# Patient Record
Sex: MALE | Race: WHITE | HISPANIC OR LATINO
[De-identification: names, ages, dates, MRNs, and addresses within clinical notes are randomized per-mention and may not be internally consistent; named-entity substitution may affect disease eponyms.]

---

## 2017-01-17 ENCOUNTER — APPOINTMENT (OUTPATIENT)
Dept: PLASTIC SURGERY | Facility: CLINIC | Age: 47
End: 2017-01-17

## 2017-01-24 ENCOUNTER — FORM ENCOUNTER (OUTPATIENT)
Age: 47
End: 2017-01-24

## 2017-01-25 ENCOUNTER — OUTPATIENT (OUTPATIENT)
Dept: OUTPATIENT SERVICES | Facility: HOSPITAL | Age: 47
LOS: 1 days | End: 2017-01-25
Payer: COMMERCIAL

## 2017-01-25 ENCOUNTER — RESULT CHARGE (OUTPATIENT)
Age: 47
End: 2017-01-25

## 2017-01-25 VITALS
TEMPERATURE: 98 F | RESPIRATION RATE: 16 BRPM | OXYGEN SATURATION: 97 % | WEIGHT: 212.08 LBS | HEART RATE: 70 BPM | SYSTOLIC BLOOD PRESSURE: 137 MMHG | HEIGHT: 70 IN | DIASTOLIC BLOOD PRESSURE: 88 MMHG

## 2017-01-25 DIAGNOSIS — R22.42 LOCALIZED SWELLING, MASS AND LUMP, LEFT LOWER LIMB: ICD-10-CM

## 2017-01-25 DIAGNOSIS — Z01.818 ENCOUNTER FOR OTHER PREPROCEDURAL EXAMINATION: ICD-10-CM

## 2017-01-25 DIAGNOSIS — Z98.890 OTHER SPECIFIED POSTPROCEDURAL STATES: Chronic | ICD-10-CM

## 2017-01-25 LAB
ANION GAP SERPL CALC-SCNC: 16 MMOL/L — SIGNIFICANT CHANGE UP (ref 5–17)
BUN SERPL-MCNC: 12 MG/DL — SIGNIFICANT CHANGE UP (ref 7–23)
CALCIUM SERPL-MCNC: 9.8 MG/DL — SIGNIFICANT CHANGE UP (ref 8.4–10.5)
CHLORIDE SERPL-SCNC: 100 MMOL/L — SIGNIFICANT CHANGE UP (ref 96–108)
CO2 SERPL-SCNC: 25 MMOL/L — SIGNIFICANT CHANGE UP (ref 22–31)
CREAT SERPL-MCNC: 0.67 MG/DL — SIGNIFICANT CHANGE UP (ref 0.5–1.3)
GLUCOSE SERPL-MCNC: 98 MG/DL — SIGNIFICANT CHANGE UP (ref 70–99)
HCT VFR BLD CALC: 46.2 % — SIGNIFICANT CHANGE UP (ref 39–50)
HGB BLD-MCNC: 16 G/DL — SIGNIFICANT CHANGE UP (ref 13–17)
MCHC RBC-ENTMCNC: 30.4 PG — SIGNIFICANT CHANGE UP (ref 27–34)
MCHC RBC-ENTMCNC: 34.6 GM/DL — SIGNIFICANT CHANGE UP (ref 32–36)
MCV RBC AUTO: 87.8 FL — SIGNIFICANT CHANGE UP (ref 80–100)
PLATELET # BLD AUTO: 348 K/UL — SIGNIFICANT CHANGE UP (ref 150–400)
POTASSIUM SERPL-MCNC: 4.6 MMOL/L — SIGNIFICANT CHANGE UP (ref 3.5–5.3)
POTASSIUM SERPL-SCNC: 4.6 MMOL/L — SIGNIFICANT CHANGE UP (ref 3.5–5.3)
RBC # BLD: 5.26 M/UL — SIGNIFICANT CHANGE UP (ref 4.2–5.8)
RBC # FLD: 12.3 % — SIGNIFICANT CHANGE UP (ref 10.3–14.5)
SODIUM SERPL-SCNC: 141 MMOL/L — SIGNIFICANT CHANGE UP (ref 135–145)
WBC # BLD: 5.14 K/UL — SIGNIFICANT CHANGE UP (ref 3.8–10.5)
WBC # FLD AUTO: 5.14 K/UL — SIGNIFICANT CHANGE UP (ref 3.8–10.5)

## 2017-01-25 PROCEDURE — 71020: CPT | Mod: 26

## 2017-01-25 PROCEDURE — 71046 X-RAY EXAM CHEST 2 VIEWS: CPT

## 2017-01-25 PROCEDURE — G0463: CPT

## 2017-01-25 PROCEDURE — 80048 BASIC METABOLIC PNL TOTAL CA: CPT

## 2017-01-25 PROCEDURE — 36415 COLL VENOUS BLD VENIPUNCTURE: CPT

## 2017-01-25 PROCEDURE — 85027 COMPLETE CBC AUTOMATED: CPT

## 2017-01-25 RX ORDER — CEFAZOLIN SODIUM 1 G
2000 VIAL (EA) INJECTION ONCE
Qty: 0 | Refills: 0 | Status: DISCONTINUED | OUTPATIENT
Start: 2017-02-01 | End: 2017-02-02

## 2017-01-25 NOTE — H&P PST ADULT - NSANTHOSAYNRD_GEN_A_CORE
No. MERI screening performed.  STOP BANG Legend: 0-2 = LOW Risk; 3-4 = INTERMEDIATE Risk; 5-8 = HIGH Risk

## 2017-01-25 NOTE — H&P PST ADULT - PMH
Anorectal abscess    History of Clostridium difficile colitis  2012 Anorectal abscess    History of Clostridium difficile colitis  2012  Mass of left thigh

## 2017-01-25 NOTE — H&P PST ADULT - FAMILY HISTORY
Father  Still living? No  Family history of gastric cancer, Age at diagnosis: Age Unknown     Mother  Still living? Yes, Estimated age: Age Unknown  Family history of diabetes mellitus, Age at diagnosis: Age Unknown

## 2017-01-25 NOTE — H&P PST ADULT - HISTORY OF PRESENT ILLNESS
45 yr old male 46 yr old male with left thigh mass presents to PST for scheduled resection on 2/1/17.

## 2017-01-25 NOTE — H&P PST ADULT - MUSCULOSKELETAL COMMENTS
left thigh mass, slightly tender post bx in December, denies numbness , tingling left thigh posterior swelling, about [id number] , no signes of infection

## 2017-01-25 NOTE — H&P PST ADULT - ATTENDING COMMENTS
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    D/W him and his fiancee in the office, and again today.  All questions answered.  Consent on chart.

## 2017-01-25 NOTE — H&P PST ADULT - MALLAMPATI CLASS
Class I (easy) - visualization of the soft palate, fauces, uvula, and both anterior and posterior pillars Class II - visualization of the soft palate, fauces, and uvula

## 2017-01-26 ENCOUNTER — OUTPATIENT (OUTPATIENT)
Dept: OUTPATIENT SERVICES | Facility: HOSPITAL | Age: 47
LOS: 1 days | End: 2017-01-26
Payer: COMMERCIAL

## 2017-01-26 ENCOUNTER — NON-APPOINTMENT (OUTPATIENT)
Age: 47
End: 2017-01-26

## 2017-01-26 ENCOUNTER — APPOINTMENT (OUTPATIENT)
Dept: INTERNAL MEDICINE | Facility: CLINIC | Age: 47
End: 2017-01-26

## 2017-01-26 VITALS
SYSTOLIC BLOOD PRESSURE: 130 MMHG | HEIGHT: 70 IN | BODY MASS INDEX: 30.92 KG/M2 | WEIGHT: 216 LBS | HEART RATE: 72 BPM | DIASTOLIC BLOOD PRESSURE: 80 MMHG

## 2017-01-26 DIAGNOSIS — R22.42 LOCALIZED SWELLING, MASS AND LUMP, LEFT LOWER LIMB: ICD-10-CM

## 2017-01-26 DIAGNOSIS — I10 ESSENTIAL (PRIMARY) HYPERTENSION: ICD-10-CM

## 2017-01-26 DIAGNOSIS — Z98.890 OTHER SPECIFIED POSTPROCEDURAL STATES: Chronic | ICD-10-CM

## 2017-01-26 PROCEDURE — G0463: CPT

## 2017-01-26 PROCEDURE — 93005 ELECTROCARDIOGRAM TRACING: CPT

## 2017-01-27 DIAGNOSIS — R22.42 LOCALIZED SWELLING, MASS AND LUMP, LEFT LOWER LIMB: ICD-10-CM

## 2017-01-27 DIAGNOSIS — Z01.818 ENCOUNTER FOR OTHER PREPROCEDURAL EXAMINATION: ICD-10-CM

## 2017-01-31 ENCOUNTER — RESULT REVIEW (OUTPATIENT)
Age: 47
End: 2017-01-31

## 2017-01-31 PROBLEM — Z86.19 PERSONAL HISTORY OF OTHER INFECTIOUS AND PARASITIC DISEASES: Chronic | Status: ACTIVE | Noted: 2017-01-25

## 2017-02-01 ENCOUNTER — APPOINTMENT (OUTPATIENT)
Dept: SPINE | Facility: HOSPITAL | Age: 47
End: 2017-02-01

## 2017-02-01 ENCOUNTER — APPOINTMENT (OUTPATIENT)
Dept: SURGICAL ONCOLOGY | Facility: HOSPITAL | Age: 47
End: 2017-02-01

## 2017-02-01 ENCOUNTER — INPATIENT (INPATIENT)
Facility: HOSPITAL | Age: 47
LOS: 0 days | Discharge: HOME | DRG: 502 | End: 2017-02-02
Attending: SPECIALIST | Admitting: SPECIALIST
Payer: COMMERCIAL

## 2017-02-01 VITALS
HEART RATE: 71 BPM | HEIGHT: 70 IN | TEMPERATURE: 98 F | OXYGEN SATURATION: 96 % | WEIGHT: 212.08 LBS | DIASTOLIC BLOOD PRESSURE: 87 MMHG | SYSTOLIC BLOOD PRESSURE: 133 MMHG | RESPIRATION RATE: 18 BRPM

## 2017-02-01 DIAGNOSIS — Z98.890 OTHER SPECIFIED POSTPROCEDURAL STATES: Chronic | ICD-10-CM

## 2017-02-01 DIAGNOSIS — R22.42 LOCALIZED SWELLING, MASS AND LUMP, LEFT LOWER LIMB: ICD-10-CM

## 2017-02-01 PROCEDURE — 13122 CMPLX RPR S/A/L ADDL 5 CM/>: CPT | Mod: 59

## 2017-02-01 PROCEDURE — 88342 IMHCHEM/IMCYTCHM 1ST ANTB: CPT | Mod: 26

## 2017-02-01 PROCEDURE — 13121 CMPLX RPR S/A/L 2.6-7.5 CM: CPT | Mod: 59

## 2017-02-01 PROCEDURE — 64786 REMOVE SCIATIC NERVE LESION: CPT

## 2017-02-01 PROCEDURE — 88305 TISSUE EXAM BY PATHOLOGIST: CPT | Mod: 26

## 2017-02-01 PROCEDURE — 64790 REMOVAL OF NERVE LESION: CPT

## 2017-02-01 PROCEDURE — 15738 MUSCLE-SKIN GRAFT LEG: CPT

## 2017-02-01 RX ORDER — SODIUM CHLORIDE 9 MG/ML
1000 INJECTION, SOLUTION INTRAVENOUS
Qty: 0 | Refills: 0 | Status: DISCONTINUED | OUTPATIENT
Start: 2017-02-01 | End: 2017-02-02

## 2017-02-01 RX ORDER — HYDROMORPHONE HYDROCHLORIDE 2 MG/ML
0.5 INJECTION INTRAMUSCULAR; INTRAVENOUS; SUBCUTANEOUS
Qty: 0 | Refills: 0 | Status: DISCONTINUED | OUTPATIENT
Start: 2017-02-01 | End: 2017-02-01

## 2017-02-01 RX ORDER — OXYCODONE HYDROCHLORIDE 5 MG/1
10 TABLET ORAL ONCE
Qty: 0 | Refills: 0 | Status: DISCONTINUED | OUTPATIENT
Start: 2017-02-01 | End: 2017-02-01

## 2017-02-01 RX ORDER — TRAMADOL HYDROCHLORIDE 50 MG/1
50 TABLET ORAL ONCE
Qty: 0 | Refills: 0 | Status: DISCONTINUED | OUTPATIENT
Start: 2017-02-01 | End: 2017-02-01

## 2017-02-01 RX ORDER — OXYCODONE HYDROCHLORIDE 5 MG/1
10 TABLET ORAL EVERY 4 HOURS
Qty: 0 | Refills: 0 | Status: DISCONTINUED | OUTPATIENT
Start: 2017-02-01 | End: 2017-02-02

## 2017-02-01 RX ORDER — OXYCODONE HYDROCHLORIDE 5 MG/1
5 TABLET ORAL EVERY 6 HOURS
Qty: 0 | Refills: 0 | Status: DISCONTINUED | OUTPATIENT
Start: 2017-02-01 | End: 2017-02-02

## 2017-02-01 RX ORDER — HYDROMORPHONE HYDROCHLORIDE 2 MG/ML
0.5 INJECTION INTRAMUSCULAR; INTRAVENOUS; SUBCUTANEOUS
Qty: 0 | Refills: 0 | Status: DISCONTINUED | OUTPATIENT
Start: 2017-02-01 | End: 2017-02-02

## 2017-02-01 RX ORDER — ACETAMINOPHEN 500 MG
1000 TABLET ORAL ONCE
Qty: 0 | Refills: 0 | Status: COMPLETED | OUTPATIENT
Start: 2017-02-01 | End: 2017-02-01

## 2017-02-01 RX ORDER — SODIUM CHLORIDE 9 MG/ML
3 INJECTION INTRAMUSCULAR; INTRAVENOUS; SUBCUTANEOUS EVERY 8 HOURS
Qty: 0 | Refills: 0 | Status: DISCONTINUED | OUTPATIENT
Start: 2017-02-01 | End: 2017-02-01

## 2017-02-01 RX ORDER — OXYCODONE HYDROCHLORIDE 5 MG/1
2 TABLET ORAL
Qty: 60 | Refills: 0 | OUTPATIENT
Start: 2017-02-01 | End: 2017-02-06

## 2017-02-01 RX ORDER — ACETAMINOPHEN 500 MG
650 TABLET ORAL EVERY 6 HOURS
Qty: 0 | Refills: 0 | Status: DISCONTINUED | OUTPATIENT
Start: 2017-02-01 | End: 2017-02-02

## 2017-02-01 RX ORDER — ONDANSETRON 8 MG/1
4 TABLET, FILM COATED ORAL ONCE
Qty: 0 | Refills: 0 | Status: COMPLETED | OUTPATIENT
Start: 2017-02-01 | End: 2017-02-01

## 2017-02-01 RX ADMIN — TRAMADOL HYDROCHLORIDE 50 MILLIGRAM(S): 50 TABLET ORAL at 20:49

## 2017-02-01 RX ADMIN — HYDROMORPHONE HYDROCHLORIDE 0.5 MILLIGRAM(S): 2 INJECTION INTRAMUSCULAR; INTRAVENOUS; SUBCUTANEOUS at 16:50

## 2017-02-01 RX ADMIN — ONDANSETRON 4 MILLIGRAM(S): 8 TABLET, FILM COATED ORAL at 20:15

## 2017-02-01 RX ADMIN — HYDROMORPHONE HYDROCHLORIDE 0.5 MILLIGRAM(S): 2 INJECTION INTRAMUSCULAR; INTRAVENOUS; SUBCUTANEOUS at 17:00

## 2017-02-01 RX ADMIN — Medication 1000 MILLIGRAM(S): at 22:10

## 2017-02-01 RX ADMIN — HYDROMORPHONE HYDROCHLORIDE 0.5 MILLIGRAM(S): 2 INJECTION INTRAMUSCULAR; INTRAVENOUS; SUBCUTANEOUS at 20:49

## 2017-02-01 RX ADMIN — SODIUM CHLORIDE 3 MILLILITER(S): 9 INJECTION INTRAMUSCULAR; INTRAVENOUS; SUBCUTANEOUS at 12:13

## 2017-02-01 RX ADMIN — HYDROMORPHONE HYDROCHLORIDE 0.5 MILLIGRAM(S): 2 INJECTION INTRAMUSCULAR; INTRAVENOUS; SUBCUTANEOUS at 17:09

## 2017-02-01 RX ADMIN — HYDROMORPHONE HYDROCHLORIDE 0.5 MILLIGRAM(S): 2 INJECTION INTRAMUSCULAR; INTRAVENOUS; SUBCUTANEOUS at 17:10

## 2017-02-01 RX ADMIN — HYDROMORPHONE HYDROCHLORIDE 0.5 MILLIGRAM(S): 2 INJECTION INTRAMUSCULAR; INTRAVENOUS; SUBCUTANEOUS at 21:00

## 2017-02-01 RX ADMIN — Medication 400 MILLIGRAM(S): at 21:41

## 2017-02-01 RX ADMIN — HYDROMORPHONE HYDROCHLORIDE 0.5 MILLIGRAM(S): 2 INJECTION INTRAMUSCULAR; INTRAVENOUS; SUBCUTANEOUS at 16:49

## 2017-02-01 RX ADMIN — HYDROMORPHONE HYDROCHLORIDE 0.5 MILLIGRAM(S): 2 INJECTION INTRAMUSCULAR; INTRAVENOUS; SUBCUTANEOUS at 17:20

## 2017-02-01 RX ADMIN — TRAMADOL HYDROCHLORIDE 50 MILLIGRAM(S): 50 TABLET ORAL at 21:30

## 2017-02-01 RX ADMIN — OXYCODONE HYDROCHLORIDE 10 MILLIGRAM(S): 5 TABLET ORAL at 17:38

## 2017-02-01 RX ADMIN — HYDROMORPHONE HYDROCHLORIDE 0.5 MILLIGRAM(S): 2 INJECTION INTRAMUSCULAR; INTRAVENOUS; SUBCUTANEOUS at 16:40

## 2017-02-01 NOTE — ASU DISCHARGE PLAN (ADULT/PEDIATRIC). - FOLLOWUP APPOINTMENT CLINIC/PHYSICIAN
Please follow up with Dr. Nguyen on Friday 2/10. You may call [phone] to schedule an appointment.

## 2017-02-01 NOTE — ASU DISCHARGE PLAN (ADULT/PEDIATRIC). - SPECIAL INSTRUCTIONS
Please keep dressing dry until followup with Dr. Nguyen next Friday. You may sponge bath or shower with your left leg covered.

## 2017-02-01 NOTE — ASU DISCHARGE PLAN (ADULT/PEDIATRIC). - NOTIFY
Numbness, color, or temperature change to extremity/Fever greater than 101/Bleeding that does not stop/Swelling that continues/Numbness, tingling

## 2017-02-01 NOTE — ASU DISCHARGE PLAN (ADULT/PEDIATRIC). - DRESSING FT
Please keep dressing on and dry until followup with Dr. Nguyen Please keep dressing on and dry until follow-up with Dr. Nguyen

## 2017-02-01 NOTE — ASU DISCHARGE PLAN (ADULT/PEDIATRIC). - MEDICATION SUMMARY - MEDICATIONS TO TAKE
I will START or STAY ON the medications listed below when I get home from the hospital:    Percocet 10/325 oral tablet  -- 1 tab(s) by mouth every 4 hours MDD:6 tabs  -- Caution federal law prohibits the transfer of this drug to any person other  than the person for whom it was prescribed.  May cause drowsiness.  Alcohol may intensify this effect.  Use care when operating dangerous machinery.  This prescription cannot be refilled.  This product contains acetaminophen.  Do not use  with any other product containing acetaminophen to prevent possible liver damage.  Using more of this medication than prescribed may cause serious breathing problems.    -- Indication: For Pain I will START or STAY ON the medications listed below when I get home from the hospital:    oxyCODONE 10 mg oral tablet  -- 2 tab(s) by mouth every 4 hours, As Needed -for severe pain MDD:12  -- Indication: For Pain

## 2017-02-01 NOTE — BRIEF OPERATIVE NOTE - OPERATION/FINDINGS
Excision of [id number] nerve sheath tumor from L posterior thigh with intraoperative neurologic monitoring. Primary closure by plastic surgery.

## 2017-02-02 ENCOUNTER — TRANSCRIPTION ENCOUNTER (OUTPATIENT)
Age: 47
End: 2017-02-02

## 2017-02-02 VITALS — HEART RATE: 83 BPM | DIASTOLIC BLOOD PRESSURE: 69 MMHG | SYSTOLIC BLOOD PRESSURE: 123 MMHG

## 2017-02-02 PROCEDURE — 88341 IMHCHEM/IMCYTCHM EA ADD ANTB: CPT

## 2017-02-02 PROCEDURE — C1889: CPT

## 2017-02-02 PROCEDURE — 97161 PT EVAL LOW COMPLEX 20 MIN: CPT

## 2017-02-02 PROCEDURE — 88305 TISSUE EXAM BY PATHOLOGIST: CPT

## 2017-02-02 RX ORDER — ACETAMINOPHEN 500 MG
2 TABLET ORAL
Qty: 0 | Refills: 0 | COMMUNITY
Start: 2017-02-02

## 2017-02-02 RX ADMIN — OXYCODONE HYDROCHLORIDE 10 MILLIGRAM(S): 5 TABLET ORAL at 03:00

## 2017-02-02 RX ADMIN — OXYCODONE HYDROCHLORIDE 10 MILLIGRAM(S): 5 TABLET ORAL at 08:34

## 2017-02-02 RX ADMIN — HYDROMORPHONE HYDROCHLORIDE 0.5 MILLIGRAM(S): 2 INJECTION INTRAMUSCULAR; INTRAVENOUS; SUBCUTANEOUS at 02:48

## 2017-02-02 RX ADMIN — Medication 650 MILLIGRAM(S): at 10:44

## 2017-02-02 RX ADMIN — OXYCODONE HYDROCHLORIDE 10 MILLIGRAM(S): 5 TABLET ORAL at 12:19

## 2017-02-02 RX ADMIN — HYDROMORPHONE HYDROCHLORIDE 0.5 MILLIGRAM(S): 2 INJECTION INTRAMUSCULAR; INTRAVENOUS; SUBCUTANEOUS at 02:32

## 2017-02-02 RX ADMIN — OXYCODONE HYDROCHLORIDE 5 MILLIGRAM(S): 5 TABLET ORAL at 05:34

## 2017-02-02 RX ADMIN — OXYCODONE HYDROCHLORIDE 10 MILLIGRAM(S): 5 TABLET ORAL at 08:04

## 2017-02-02 RX ADMIN — Medication 650 MILLIGRAM(S): at 10:14

## 2017-02-02 RX ADMIN — OXYCODONE HYDROCHLORIDE 10 MILLIGRAM(S): 5 TABLET ORAL at 02:31

## 2017-02-02 RX ADMIN — OXYCODONE HYDROCHLORIDE 5 MILLIGRAM(S): 5 TABLET ORAL at 04:58

## 2017-02-02 NOTE — DISCHARGE NOTE ADULT - MEDICATION SUMMARY - MEDICATIONS TO TAKE
I will START or STAY ON the medications listed below when I get home from the hospital:    oxyCODONE 10 mg oral tablet  -- 2 tab(s) by mouth every 4 hours, As Needed -for severe pain MDD:12  -- Indication: For Pain    acetaminophen 325 mg oral tablet  -- 2 tab(s) by mouth every 6 hours  -- Indication: For Pain

## 2017-02-02 NOTE — DISCHARGE NOTE ADULT - PLAN OF CARE
wound healing WOUND CARE:  Please keep incisions clean and dry. Please do not Scrub or rub incisions. Do not use lotion or powder on incisions.   BATHING: Please do not submerge wound underwater. You may shower and/or sponge bathe, but keep dressing dry.  ACTIVITY: No heavy lifting or straining. Otherwise, you may return to your usual level of physical activity. If you are taking narcotic pain medication (such as Percocet) DO NOT drive a car, operate machinery or make important decisions.  DIET: Return to your usual diet.  NOTIFY YOUR SURGEON IF: You have any bleeding that does not stop, any pus draining from your wound(s), any fever (over 100.4 F) or chills, persistent nausea/vomiting, persistent diarrhea, or if your pain is not controlled on your discharge pain medications.  FOLLOW-UP: Please follow up with your primary care physician in one week regarding your hospitalization. Please follow-up with Dr. Nguyen next Friday- please call to schedule an appointment.  Dr. Sal will call you with pathology results, please schedule an appointment at that time.

## 2017-02-02 NOTE — PATIENT PROFILE ADULT. - VISION (WITH CORRECTIVE LENSES IF THE PATIENT USUALLY WEARS THEM):
Normal vision: sees adequately in most situations; can see medication labels, newsprint/pt wears corrective lenses

## 2017-02-02 NOTE — DISCHARGE NOTE ADULT - CARE PROVIDER_API CALL
Jensen Nguyen (MD), ColonRectal Surgery; Plastic Surgery; Surgery; Surgery of the Hand  [address] [place] [id number]  Phone: [phone]  Fax: [phone]    Rafa Sal), Surgery  [address] [place] [address]  Phone: [phone]  Fax: [phone]

## 2017-02-02 NOTE — PHYSICAL THERAPY INITIAL EVALUATION ADULT - ADDITIONAL COMMENTS
Pt. lives at home on second floor. 1 flight of stairs with railing.  Prior to arrival, IND with all ADLs and no device.  Pt is able to drive.

## 2017-02-02 NOTE — DISCHARGE NOTE ADULT - CARE PROVIDERS DIRECT ADDRESSES
[email or web address]

## 2017-02-02 NOTE — DISCHARGE NOTE ADULT - PATIENT PORTAL LINK FT
“You can access the FollowHealth Patient Portal, offered by [facility], by registering with the following website: [email or web address]”

## 2017-02-02 NOTE — DISCHARGE NOTE ADULT - ADDITIONAL INSTRUCTIONS
Please keep dressing dry until follow-up with Dr. Nguyen. You may shower or sponge bathe but please do not wet wound area.  Please call to schedule an appointment with Dr. Nguyen for the end of next week. Dr. Sal will call you with pathology results.

## 2017-02-02 NOTE — PHYSICAL THERAPY INITIAL EVALUATION ADULT - DISCHARGE DISPOSITION, PT EVAL
D/C home with outpatient PT services, assistance from spouse as needed for mobility/ADLs, use of axillary crutches, CM to be notified/home w/ outpatient services

## 2017-02-02 NOTE — DISCHARGE NOTE ADULT - CARE PLAN
Principal Discharge DX:	Mass of left thigh  Goal:	wound healing  Instructions for follow-up, activity and diet:	WOUND CARE:  Please keep incisions clean and dry. Please do not Scrub or rub incisions. Do not use lotion or powder on incisions.   BATHING: Please do not submerge wound underwater. You may shower and/or sponge bathe, but keep dressing dry.  ACTIVITY: No heavy lifting or straining. Otherwise, you may return to your usual level of physical activity. If you are taking narcotic pain medication (such as Percocet) DO NOT drive a car, operate machinery or make important decisions.  DIET: Return to your usual diet.  NOTIFY YOUR SURGEON IF: You have any bleeding that does not stop, any pus draining from your wound(s), any fever (over 100.4 F) or chills, persistent nausea/vomiting, persistent diarrhea, or if your pain is not controlled on your discharge pain medications.  FOLLOW-UP: Please follow up with your primary care physician in one week regarding your hospitalization. Please follow-up with Dr. Nguyen next Friday- please call to schedule an appointment.  Dr. Sal will call you with pathology results, please schedule an appointment at that time.

## 2017-02-02 NOTE — DISCHARGE NOTE ADULT - VISION (WITH CORRECTIVE LENSES IF THE PATIENT USUALLY WEARS THEM):
pt wears corrective lenses/Normal vision: sees adequately in most situations; can see medication labels, newsprint

## 2017-02-02 NOTE — DISCHARGE NOTE ADULT - HOSPITAL COURSE
The patient presented 2/1/17 for an elective resection of a left thigh mass with intraoperative neurologic monitoring and plastic surgery closure. The operation went without complication and he was transferred to the PACU postoperatively. He spent the night in the hospital for pain control. He requested to be seen by physical therapy, who recommended outpatient physical therapy. His pain was under better control the next morning.  At the time of discharge, the patient was hemodynamically stable, was tolerating PO diet, was voiding urine and passing stool, was ambulating, and was comfortable with adequate pain control. The patient was instructed to follow up with Dr. Nguyen the week after discharge from the hospital. The patient/family felt comfortable with discharge. The patient was discharged to home. The patient had no other issues.

## 2017-02-02 NOTE — DISCHARGE NOTE ADULT - CONDITIONS AT DISCHARGE
Pt. a/o x4, ambulatory w/ and w/out crutches, tolerating diet, voiding, and w/ left thigh incision w/ dressing clean, dry, and intact.

## 2017-02-06 ENCOUNTER — EMERGENCY (EMERGENCY)
Facility: HOSPITAL | Age: 47
LOS: 1 days | Discharge: HOME | End: 2017-02-06
Attending: EMERGENCY MEDICINE | Admitting: EMERGENCY MEDICINE
Payer: MEDICAID

## 2017-02-06 VITALS
TEMPERATURE: 98 F | RESPIRATION RATE: 17 BRPM | DIASTOLIC BLOOD PRESSURE: 80 MMHG | HEART RATE: 97 BPM | OXYGEN SATURATION: 100 % | SYSTOLIC BLOOD PRESSURE: 135 MMHG

## 2017-02-06 VITALS
SYSTOLIC BLOOD PRESSURE: 135 MMHG | OXYGEN SATURATION: 98 % | RESPIRATION RATE: 16 BRPM | HEART RATE: 70 BPM | TEMPERATURE: 98 F | DIASTOLIC BLOOD PRESSURE: 87 MMHG

## 2017-02-06 DIAGNOSIS — C47.22: ICD-10-CM

## 2017-02-06 DIAGNOSIS — K59.00 CONSTIPATION, UNSPECIFIED: ICD-10-CM

## 2017-02-06 DIAGNOSIS — Z98.890 OTHER SPECIFIED POSTPROCEDURAL STATES: Chronic | ICD-10-CM

## 2017-02-06 DIAGNOSIS — Z87.891 PERSONAL HISTORY OF NICOTINE DEPENDENCE: ICD-10-CM

## 2017-02-06 PROCEDURE — 96372 THER/PROPH/DIAG INJ SC/IM: CPT

## 2017-02-06 PROCEDURE — 99283 EMERGENCY DEPT VISIT LOW MDM: CPT | Mod: 25

## 2017-02-06 PROCEDURE — 99284 EMERGENCY DEPT VISIT MOD MDM: CPT

## 2017-02-06 RX ORDER — METHYLNALTREXONE BROMIDE 12 MG/.6ML
12 INJECTION, SOLUTION SUBCUTANEOUS ONCE
Qty: 0 | Refills: 0 | Status: COMPLETED | OUTPATIENT
Start: 2017-02-06 | End: 2017-02-06

## 2017-02-06 RX ADMIN — METHYLNALTREXONE BROMIDE 12 MILLIGRAM(S): 12 INJECTION, SOLUTION SUBCUTANEOUS at 18:22

## 2017-02-06 NOTE — ED PROVIDER NOTE - MEDICAL DECISION MAKING DETAILS
45 yo with no sig hx comes to the ED with complaints of constipation s/p surgery on wednesday for a benign mass on his leg was given oxycodone to go home with but with no stool softners, has not had a BM in 5 days, stopped taking the pain meds 2 days ago, was taking them every 4 hrs before then. No n/v is passing flatus, EXAM: soft nt/nd +BS PLAN: relestor IM here and dc home with oral laxatives and suppostiory.

## 2017-02-06 NOTE — ED PROVIDER NOTE - CARE PLAN
Principal Discharge DX:	Constipation  Instructions for follow-up, activity and diet:	Take over-the-counter Senna, Colace, AND Miralax as directed. Also take over-the-counter Magnesium Citrate and use over-the-counter glycerin suppositories and Fleet enemas. Drink plenty of fluids and get plenty of rest. Follow up with your primary doctor tomorrow. Return to the Emergency Dept if you develop any new or worsening symptoms especially worsening pain or vomiting

## 2017-02-06 NOTE — ED PROVIDER NOTE - OBJECTIVE STATEMENT
46 year old male with recent admission for excision of left thigh mass, pathology came back benign, was given oxycodone for leg pain and cramps, however was not taking any laxative. Finally after 4-5 days without a BM he took colace but it did not work. No vomiting, no abdominal tenderness, no back pain   [facility] [place]

## 2017-02-06 NOTE — ED PROVIDER NOTE - ATTENDING CONTRIBUTION TO CARE
47 yo with no sig hx comes to the ED with complaints of constipation s/p surgery on wednesday for a benign mass on his leg was given oxycodone to go home with but with no stool softners, has not had a BM in 5 days, stopped taking the pain meds 2 days ago, was taking them every 4 hrs before then. No n/v is passing flatus, EXAM: soft nt/nd +BS PLAN: relestor IM here and dc home with oral laxatives and suppostiory.

## 2017-02-06 NOTE — ED ADULT NURSE NOTE - OBJECTIVE STATEMENT
46 year old male was given oxycodone for leg pain and cramps s/p recent admission for left thigh mass, biopsy was benign as per pt.  Pt was not taking any laxative. Pt c/o no BM 4-5 days and c/o abd pain.  No vomiting, no abdominal tenderness, no back pain.  Respiration easy and non labored.

## 2017-02-07 ENCOUNTER — INPATIENT (INPATIENT)
Facility: HOSPITAL | Age: 47
LOS: 2 days | Discharge: HOME | DRG: 392 | End: 2017-02-10
Attending: SURGERY | Admitting: SPECIALIST
Payer: COMMERCIAL

## 2017-02-07 VITALS — RESPIRATION RATE: 20 BRPM | SYSTOLIC BLOOD PRESSURE: 164 MMHG | HEART RATE: 88 BPM | DIASTOLIC BLOOD PRESSURE: 100 MMHG

## 2017-02-07 DIAGNOSIS — Z98.890 OTHER SPECIFIED POSTPROCEDURAL STATES: Chronic | ICD-10-CM

## 2017-02-07 DIAGNOSIS — K52.9 NONINFECTIVE GASTROENTERITIS AND COLITIS, UNSPECIFIED: ICD-10-CM

## 2017-02-07 LAB
ALBUMIN SERPL ELPH-MCNC: 4.5 G/DL — SIGNIFICANT CHANGE UP (ref 3.3–5)
ALP SERPL-CCNC: 80 U/L — SIGNIFICANT CHANGE UP (ref 40–120)
ALT FLD-CCNC: 42 U/L RC — SIGNIFICANT CHANGE UP (ref 10–45)
ANION GAP SERPL CALC-SCNC: 16 MMOL/L — SIGNIFICANT CHANGE UP (ref 5–17)
APPEARANCE UR: CLEAR — SIGNIFICANT CHANGE UP
APTT BLD: 28.7 SEC — SIGNIFICANT CHANGE UP (ref 27.5–37.4)
AST SERPL-CCNC: 25 U/L — SIGNIFICANT CHANGE UP (ref 10–40)
BACTERIA # UR AUTO: ABNORMAL /HPF
BASOPHILS # BLD AUTO: 0 K/UL — SIGNIFICANT CHANGE UP (ref 0–0.2)
BASOPHILS NFR BLD AUTO: 0.2 % — SIGNIFICANT CHANGE UP (ref 0–2)
BILIRUB SERPL-MCNC: 0.7 MG/DL — SIGNIFICANT CHANGE UP (ref 0.2–1.2)
BILIRUB UR-MCNC: NEGATIVE — SIGNIFICANT CHANGE UP
BUN SERPL-MCNC: 16 MG/DL — SIGNIFICANT CHANGE UP (ref 7–23)
CALCIUM SERPL-MCNC: 9.9 MG/DL — SIGNIFICANT CHANGE UP (ref 8.4–10.5)
CHLORIDE SERPL-SCNC: 99 MMOL/L — SIGNIFICANT CHANGE UP (ref 96–108)
CO2 SERPL-SCNC: 27 MMOL/L — SIGNIFICANT CHANGE UP (ref 22–31)
COLOR SPEC: YELLOW — SIGNIFICANT CHANGE UP
CREAT SERPL-MCNC: 0.91 MG/DL — SIGNIFICANT CHANGE UP (ref 0.5–1.3)
DIFF PNL FLD: NEGATIVE — SIGNIFICANT CHANGE UP
EOSINOPHIL # BLD AUTO: 0.1 K/UL — SIGNIFICANT CHANGE UP (ref 0–0.5)
EOSINOPHIL NFR BLD AUTO: 0.6 % — SIGNIFICANT CHANGE UP (ref 0–6)
GAS PNL BLDV: SIGNIFICANT CHANGE UP
GAS PNL BLDV: SIGNIFICANT CHANGE UP
GLUCOSE SERPL-MCNC: 140 MG/DL — HIGH (ref 70–99)
GLUCOSE UR QL: NEGATIVE — SIGNIFICANT CHANGE UP
HCT VFR BLD CALC: 44.8 % — SIGNIFICANT CHANGE UP (ref 39–50)
HGB BLD-MCNC: 15.5 G/DL — SIGNIFICANT CHANGE UP (ref 13–17)
INR BLD: 1.18 RATIO — HIGH (ref 0.88–1.16)
KETONES UR-MCNC: NEGATIVE — SIGNIFICANT CHANGE UP
LEUKOCYTE ESTERASE UR-ACNC: NEGATIVE — SIGNIFICANT CHANGE UP
LYMPHOCYTES # BLD AUTO: 2.5 K/UL — SIGNIFICANT CHANGE UP (ref 1–3.3)
LYMPHOCYTES # BLD AUTO: 21.3 % — SIGNIFICANT CHANGE UP (ref 13–44)
MCHC RBC-ENTMCNC: 30.9 PG — SIGNIFICANT CHANGE UP (ref 27–34)
MCHC RBC-ENTMCNC: 34.7 GM/DL — SIGNIFICANT CHANGE UP (ref 32–36)
MCV RBC AUTO: 89.2 FL — SIGNIFICANT CHANGE UP (ref 80–100)
MONOCYTES # BLD AUTO: 0.7 K/UL — SIGNIFICANT CHANGE UP (ref 0–0.9)
MONOCYTES NFR BLD AUTO: 5.8 % — SIGNIFICANT CHANGE UP (ref 2–14)
NEUTROPHILS # BLD AUTO: 8.3 K/UL — HIGH (ref 1.8–7.4)
NEUTROPHILS NFR BLD AUTO: 72.1 % — SIGNIFICANT CHANGE UP (ref 43–77)
NITRITE UR-MCNC: NEGATIVE — SIGNIFICANT CHANGE UP
PH UR: 7 — SIGNIFICANT CHANGE UP (ref 4.8–8)
PLATELET # BLD AUTO: 368 K/UL — SIGNIFICANT CHANGE UP (ref 150–400)
POTASSIUM SERPL-MCNC: 4.1 MMOL/L — SIGNIFICANT CHANGE UP (ref 3.5–5.3)
POTASSIUM SERPL-SCNC: 4.1 MMOL/L — SIGNIFICANT CHANGE UP (ref 3.5–5.3)
PROT SERPL-MCNC: 7.9 G/DL — SIGNIFICANT CHANGE UP (ref 6–8.3)
PROT UR-MCNC: SIGNIFICANT CHANGE UP
PROTHROM AB SERPL-ACNC: 12.8 SEC — SIGNIFICANT CHANGE UP (ref 10–13.1)
RBC # BLD: 5.02 M/UL — SIGNIFICANT CHANGE UP (ref 4.2–5.8)
RBC # FLD: 12.1 % — SIGNIFICANT CHANGE UP (ref 10.3–14.5)
RBC CASTS # UR COMP ASSIST: SIGNIFICANT CHANGE UP /HPF (ref 0–2)
SODIUM SERPL-SCNC: 142 MMOL/L — SIGNIFICANT CHANGE UP (ref 135–145)
SP GR SPEC: 1.02 — SIGNIFICANT CHANGE UP (ref 1.01–1.02)
UROBILINOGEN FLD QL: NEGATIVE — SIGNIFICANT CHANGE UP
WBC # BLD: 11.5 K/UL — HIGH (ref 3.8–10.5)
WBC # FLD AUTO: 11.5 K/UL — HIGH (ref 3.8–10.5)
WBC UR QL: SIGNIFICANT CHANGE UP /HPF (ref 0–5)

## 2017-02-07 PROCEDURE — 74177 CT ABD & PELVIS W/CONTRAST: CPT | Mod: 26

## 2017-02-07 PROCEDURE — 74020: CPT | Mod: 26

## 2017-02-07 PROCEDURE — 99285 EMERGENCY DEPT VISIT HI MDM: CPT

## 2017-02-07 RX ORDER — LIDOCAINE HCL 20 MG/ML
5 VIAL (ML) INJECTION ONCE
Qty: 0 | Refills: 0 | Status: COMPLETED | OUTPATIENT
Start: 2017-02-07 | End: 2017-02-07

## 2017-02-07 RX ORDER — MORPHINE SULFATE 50 MG/1
4 CAPSULE, EXTENDED RELEASE ORAL ONCE
Qty: 0 | Refills: 0 | Status: DISCONTINUED | OUTPATIENT
Start: 2017-02-07 | End: 2017-02-07

## 2017-02-07 RX ORDER — SODIUM CHLORIDE 9 MG/ML
1000 INJECTION INTRAMUSCULAR; INTRAVENOUS; SUBCUTANEOUS ONCE
Qty: 0 | Refills: 0 | Status: COMPLETED | OUTPATIENT
Start: 2017-02-07 | End: 2017-02-07

## 2017-02-07 RX ORDER — ACETAMINOPHEN 500 MG
1000 TABLET ORAL ONCE
Qty: 0 | Refills: 0 | Status: COMPLETED | OUTPATIENT
Start: 2017-02-07 | End: 2017-02-07

## 2017-02-07 RX ORDER — PIPERACILLIN AND TAZOBACTAM 4; .5 G/20ML; G/20ML
3.38 INJECTION, POWDER, LYOPHILIZED, FOR SOLUTION INTRAVENOUS EVERY 8 HOURS
Qty: 0 | Refills: 0 | Status: DISCONTINUED | OUTPATIENT
Start: 2017-02-07 | End: 2017-02-09

## 2017-02-07 RX ORDER — SODIUM CHLORIDE 9 MG/ML
1000 INJECTION, SOLUTION INTRAVENOUS
Qty: 0 | Refills: 0 | Status: DISCONTINUED | OUTPATIENT
Start: 2017-02-07 | End: 2017-02-08

## 2017-02-07 RX ORDER — ENOXAPARIN SODIUM 100 MG/ML
40 INJECTION SUBCUTANEOUS DAILY
Qty: 0 | Refills: 0 | Status: DISCONTINUED | OUTPATIENT
Start: 2017-02-07 | End: 2017-02-10

## 2017-02-07 RX ADMIN — SODIUM CHLORIDE 2000 MILLILITER(S): 9 INJECTION INTRAMUSCULAR; INTRAVENOUS; SUBCUTANEOUS at 12:40

## 2017-02-07 RX ADMIN — MORPHINE SULFATE 4 MILLIGRAM(S): 50 CAPSULE, EXTENDED RELEASE ORAL at 14:28

## 2017-02-07 RX ADMIN — MORPHINE SULFATE 4 MILLIGRAM(S): 50 CAPSULE, EXTENDED RELEASE ORAL at 12:25

## 2017-02-07 RX ADMIN — SODIUM CHLORIDE 2000 MILLILITER(S): 9 INJECTION INTRAMUSCULAR; INTRAVENOUS; SUBCUTANEOUS at 14:28

## 2017-02-07 RX ADMIN — MORPHINE SULFATE 4 MILLIGRAM(S): 50 CAPSULE, EXTENDED RELEASE ORAL at 11:50

## 2017-02-07 RX ADMIN — MORPHINE SULFATE 4 MILLIGRAM(S): 50 CAPSULE, EXTENDED RELEASE ORAL at 19:44

## 2017-02-07 RX ADMIN — Medication 1000 MILLIGRAM(S): at 20:40

## 2017-02-07 RX ADMIN — Medication 400 MILLIGRAM(S): at 19:44

## 2017-02-07 RX ADMIN — Medication 5 MILLILITER(S): at 11:50

## 2017-02-07 NOTE — H&P ADULT. - ASSESSMENT
46M w/ likely stercoral colitis 2/2 opioid use  -NPO  -IVF  -Zosyn  -GI consult in AM for possible endoscopic relief of stool obstruction  -D/w attending  -Admit to surgery

## 2017-02-07 NOTE — ED ADULT TRIAGE NOTE - CHIEF COMPLAINT QUOTE
abd pain/constipation lower back pain  no incontinence abd pain/constipation lower back pain  no incontinence no saddle anesthesia

## 2017-02-07 NOTE — ED PROVIDER NOTE - OBJECTIVE STATEMENT
47 yo F recent left thig mass excision apprx 1 week ago bibem in severe ab pain. Pt reports difficulty urinating in last 24 hrs. + constipation for several days. No f/c/s, cp, sob, n/v. Rest of ros is negative. 45 yo F recent left thigh mass excision approx 1 week ago bibem in severe ab pain. Pt reports difficulty urinating in last 24 hrs. + constipation for several days. No f/c/s, cp, sob, n/v. Rest of ros is negative.

## 2017-02-07 NOTE — ED PROVIDER NOTE - MEDICAL DECISION MAKING DETAILS
Plan to obtian stat so, check labs, xray and reassess Plan to obtain stat so, check labs, xray and reassess Plan to obtain stat so, check labs, xray and reassess    Attending MD Cifuentes: 45 yo male with pmh for recent resection of benign thigh mass presents in urinary retention and obstipation.  Last narcotic use was last night.  On exam there is lower abd distention and pain.  So placed with 1000cc output.  Rectal with hard stool, no blood.  Abd series with non-obstructive bowel gas pattern.  Patient still in pain.  Will CT to rule out obstruction vs. colitis.  Surgery consult and admit.

## 2017-02-07 NOTE — ED PROVIDER NOTE - ATTENDING CONTRIBUTION TO CARE
Attending MD Cifuentes:  I personally have seen and examined this patient.  Resident note reviewed and agree on plan of care and except where noted.  See MDM for details.

## 2017-02-07 NOTE — ED ADULT NURSE REASSESSMENT NOTE - NS ED NURSE REASSESS COMMENT FT1
patient is resting in the room waiting for a room upstairs. patient denies any complaints. vss/nad. will continue to monitor.

## 2017-02-07 NOTE — ED ADULT NURSE NOTE - OBJECTIVE STATEMENT
46M came to ED via ambulance with family c/o urinary retention and pain since yesterday. Patient is visibly distressed, yelling in pain. Patients abdomen firm, tender and distended. Patient last urinated yesterday, and last BM was 1 week ago. Pt also c/o pain to penis. Patient had surgery last week on left upper leg for tumor removal. Pt denies SOB/chest pain/N/V/D/dizziness/trauma/fever/chills. Pt +abdominal pain/tenderness/pain to penis.

## 2017-02-07 NOTE — H&P ADULT. - HISTORY OF PRESENT ILLNESS
46M POD 6 s/p resection of left posterior thigh mass presents complaining of worsening constipation with abdominal pain with associated urinary retention. Patient had been taking oxycodone for surgical pain control and developed severe constipation, has no had bowel movement in 1.5 weeks. No nausea/vomiting. Also has not been able to urinate and developed lower abdominal pain. Presented to ED yesterday for this issue where patient was straight cathed and sent home - retention was attributed to anesthesia according to patient. However retention returned and so placed in ED returned 1L of urine.    CT was significant for large rectal burden with rectal wall thickening and surrounding inflammation concerning for stercoral colitis. Patient was started on zosyn. Manual disimpaction was attempted in ED but was unsuccessful.

## 2017-02-07 NOTE — ED PROVIDER NOTE - PROGRESS NOTE DETAILS
Dr. Farah Note: s/o from Dr. Cifuentes pending ct scan...off to ct scan, awaiting results. Dr. Sal called to be updated on CTap. Left message. Hakeem Hilario, Resident.

## 2017-02-07 NOTE — ED ADULT NURSE REASSESSMENT NOTE - NS ED NURSE REASSESS COMMENT FT1
patient states having a headache. patient was medicated as per md orders. patient states abdominal pain has improved since the indwelling catheter has been placed by previous rn. patient is a/3. denies any fevers, chills, chest pain, sob. skin is warm and dry.

## 2017-02-08 LAB
CULTURE RESULTS: NO GROWTH — SIGNIFICANT CHANGE UP
SPECIMEN SOURCE: SIGNIFICANT CHANGE UP
SURGICAL PATHOLOGY STUDY: SIGNIFICANT CHANGE UP

## 2017-02-08 PROCEDURE — 99222 1ST HOSP IP/OBS MODERATE 55: CPT | Mod: GC

## 2017-02-08 RX ORDER — ACETAMINOPHEN 500 MG
1000 TABLET ORAL ONCE
Qty: 0 | Refills: 0 | Status: COMPLETED | OUTPATIENT
Start: 2017-02-08 | End: 2017-02-08

## 2017-02-08 RX ORDER — POLYETHYLENE GLYCOL 3350 17 G/17G
17 POWDER, FOR SOLUTION ORAL DAILY
Qty: 0 | Refills: 0 | Status: DISCONTINUED | OUTPATIENT
Start: 2017-02-08 | End: 2017-02-09

## 2017-02-08 RX ORDER — DEXTROSE MONOHYDRATE, SODIUM CHLORIDE, AND POTASSIUM CHLORIDE 50; .745; 4.5 G/1000ML; G/1000ML; G/1000ML
1000 INJECTION, SOLUTION INTRAVENOUS
Qty: 0 | Refills: 0 | Status: DISCONTINUED | OUTPATIENT
Start: 2017-02-08 | End: 2017-02-09

## 2017-02-08 RX ORDER — KETOROLAC TROMETHAMINE 30 MG/ML
10 SYRINGE (ML) INJECTION ONCE
Qty: 0 | Refills: 0 | Status: DISCONTINUED | OUTPATIENT
Start: 2017-02-08 | End: 2017-02-08

## 2017-02-08 RX ORDER — ACETAMINOPHEN 500 MG
650 TABLET ORAL ONCE
Qty: 0 | Refills: 0 | Status: COMPLETED | OUTPATIENT
Start: 2017-02-08 | End: 2018-01-07

## 2017-02-08 RX ORDER — ACETAMINOPHEN 500 MG
650 TABLET ORAL ONCE
Qty: 0 | Refills: 0 | Status: COMPLETED | OUTPATIENT
Start: 2017-02-08 | End: 2017-02-08

## 2017-02-08 RX ORDER — INFLUENZA VIRUS VACCINE 15; 15; 15; 15 UG/.5ML; UG/.5ML; UG/.5ML; UG/.5ML
0.5 SUSPENSION INTRAMUSCULAR ONCE
Qty: 0 | Refills: 0 | Status: DISCONTINUED | OUTPATIENT
Start: 2017-02-08 | End: 2017-02-10

## 2017-02-08 RX ORDER — TAMSULOSIN HYDROCHLORIDE 0.4 MG/1
0.4 CAPSULE ORAL AT BEDTIME
Qty: 0 | Refills: 0 | Status: DISCONTINUED | OUTPATIENT
Start: 2017-02-08 | End: 2017-02-10

## 2017-02-08 RX ADMIN — DEXTROSE MONOHYDRATE, SODIUM CHLORIDE, AND POTASSIUM CHLORIDE 75 MILLILITER(S): 50; .745; 4.5 INJECTION, SOLUTION INTRAVENOUS at 16:30

## 2017-02-08 RX ADMIN — ENOXAPARIN SODIUM 40 MILLIGRAM(S): 100 INJECTION SUBCUTANEOUS at 12:33

## 2017-02-08 RX ADMIN — Medication 1000 MILLIGRAM(S): at 01:00

## 2017-02-08 RX ADMIN — Medication 1000 MILLIGRAM(S): at 13:46

## 2017-02-08 RX ADMIN — PIPERACILLIN AND TAZOBACTAM 25 GRAM(S): 4; .5 INJECTION, POWDER, LYOPHILIZED, FOR SOLUTION INTRAVENOUS at 05:31

## 2017-02-08 RX ADMIN — SODIUM CHLORIDE 100 MILLILITER(S): 9 INJECTION, SOLUTION INTRAVENOUS at 00:39

## 2017-02-08 RX ADMIN — Medication 400 MILLIGRAM(S): at 13:54

## 2017-02-08 RX ADMIN — Medication 650 MILLIGRAM(S): at 22:28

## 2017-02-08 RX ADMIN — TAMSULOSIN HYDROCHLORIDE 0.4 MILLIGRAM(S): 0.4 CAPSULE ORAL at 22:34

## 2017-02-08 RX ADMIN — PIPERACILLIN AND TAZOBACTAM 25 GRAM(S): 4; .5 INJECTION, POWDER, LYOPHILIZED, FOR SOLUTION INTRAVENOUS at 14:45

## 2017-02-08 RX ADMIN — Medication 10 MILLIGRAM(S): at 06:10

## 2017-02-08 RX ADMIN — Medication 650 MILLIGRAM(S): at 21:54

## 2017-02-08 RX ADMIN — PIPERACILLIN AND TAZOBACTAM 25 GRAM(S): 4; .5 INJECTION, POWDER, LYOPHILIZED, FOR SOLUTION INTRAVENOUS at 22:34

## 2017-02-08 RX ADMIN — Medication 10 MILLIGRAM(S): at 05:31

## 2017-02-08 RX ADMIN — Medication 400 MILLIGRAM(S): at 00:43

## 2017-02-08 RX ADMIN — POLYETHYLENE GLYCOL 3350 17 GRAM(S): 17 POWDER, FOR SOLUTION ORAL at 16:31

## 2017-02-09 ENCOUNTER — APPOINTMENT (OUTPATIENT)
Dept: PLASTIC SURGERY | Facility: CLINIC | Age: 47
End: 2017-02-09

## 2017-02-09 ENCOUNTER — TRANSCRIPTION ENCOUNTER (OUTPATIENT)
Age: 47
End: 2017-02-09

## 2017-02-09 LAB
ANION GAP SERPL CALC-SCNC: 12 MMOL/L — SIGNIFICANT CHANGE UP (ref 5–17)
BUN SERPL-MCNC: 9 MG/DL — SIGNIFICANT CHANGE UP (ref 7–23)
CALCIUM SERPL-MCNC: 9.4 MG/DL — SIGNIFICANT CHANGE UP (ref 8.4–10.5)
CHLORIDE SERPL-SCNC: 105 MMOL/L — SIGNIFICANT CHANGE UP (ref 96–108)
CO2 SERPL-SCNC: 26 MMOL/L — SIGNIFICANT CHANGE UP (ref 22–31)
CREAT SERPL-MCNC: 0.84 MG/DL — SIGNIFICANT CHANGE UP (ref 0.5–1.3)
GLUCOSE SERPL-MCNC: 121 MG/DL — HIGH (ref 70–99)
HCT VFR BLD CALC: 40.5 % — SIGNIFICANT CHANGE UP (ref 39–50)
HGB BLD-MCNC: 14 G/DL — SIGNIFICANT CHANGE UP (ref 13–17)
MAGNESIUM SERPL-MCNC: 2.3 MG/DL — SIGNIFICANT CHANGE UP (ref 1.6–2.6)
MCHC RBC-ENTMCNC: 31 PG — SIGNIFICANT CHANGE UP (ref 27–34)
MCHC RBC-ENTMCNC: 34.6 GM/DL — SIGNIFICANT CHANGE UP (ref 32–36)
MCV RBC AUTO: 89.8 FL — SIGNIFICANT CHANGE UP (ref 80–100)
PLATELET # BLD AUTO: 338 K/UL — SIGNIFICANT CHANGE UP (ref 150–400)
POTASSIUM SERPL-MCNC: 4.1 MMOL/L — SIGNIFICANT CHANGE UP (ref 3.5–5.3)
POTASSIUM SERPL-SCNC: 4.1 MMOL/L — SIGNIFICANT CHANGE UP (ref 3.5–5.3)
RBC # BLD: 4.51 M/UL — SIGNIFICANT CHANGE UP (ref 4.2–5.8)
RBC # FLD: 11.2 % — SIGNIFICANT CHANGE UP (ref 10.3–14.5)
SODIUM SERPL-SCNC: 143 MMOL/L — SIGNIFICANT CHANGE UP (ref 135–145)
WBC # BLD: 6.1 K/UL — SIGNIFICANT CHANGE UP (ref 3.8–10.5)
WBC # FLD AUTO: 6.1 K/UL — SIGNIFICANT CHANGE UP (ref 3.8–10.5)

## 2017-02-09 PROCEDURE — 99232 SBSQ HOSP IP/OBS MODERATE 35: CPT | Mod: GC

## 2017-02-09 RX ORDER — TAMSULOSIN HYDROCHLORIDE 0.4 MG/1
1 CAPSULE ORAL
Qty: 30 | Refills: 0 | OUTPATIENT
Start: 2017-02-09 | End: 2017-03-11

## 2017-02-09 RX ORDER — POLYETHYLENE GLYCOL 3350 17 G/17G
17 POWDER, FOR SOLUTION ORAL
Qty: 0 | Refills: 0 | Status: DISCONTINUED | OUTPATIENT
Start: 2017-02-09 | End: 2017-02-10

## 2017-02-09 RX ORDER — POLYETHYLENE GLYCOL 3350 17 G/17G
17 POWDER, FOR SOLUTION ORAL
Qty: 238 | Refills: 0 | OUTPATIENT
Start: 2017-02-09 | End: 2017-02-23

## 2017-02-09 RX ORDER — ACETAMINOPHEN 500 MG
650 TABLET ORAL ONCE
Qty: 0 | Refills: 0 | Status: COMPLETED | OUTPATIENT
Start: 2017-02-09 | End: 2017-02-09

## 2017-02-09 RX ADMIN — ENOXAPARIN SODIUM 40 MILLIGRAM(S): 100 INJECTION SUBCUTANEOUS at 12:27

## 2017-02-09 RX ADMIN — TAMSULOSIN HYDROCHLORIDE 0.4 MILLIGRAM(S): 0.4 CAPSULE ORAL at 21:32

## 2017-02-09 RX ADMIN — Medication 650 MILLIGRAM(S): at 18:56

## 2017-02-09 RX ADMIN — POLYETHYLENE GLYCOL 3350 17 GRAM(S): 17 POWDER, FOR SOLUTION ORAL at 17:40

## 2017-02-09 RX ADMIN — PIPERACILLIN AND TAZOBACTAM 25 GRAM(S): 4; .5 INJECTION, POWDER, LYOPHILIZED, FOR SOLUTION INTRAVENOUS at 05:19

## 2017-02-09 RX ADMIN — Medication 650 MILLIGRAM(S): at 18:26

## 2017-02-09 NOTE — DISCHARGE NOTE ADULT - CONDITIONS AT DISCHARGE
pt alert and oriented. pt IV remove and site remain WNL. pt with left thigh dressing. pt vital signs WNL. pt discharge home.

## 2017-02-09 NOTE — DISCHARGE NOTE ADULT - NS AS ACTIVITY OBS
No Heavy lifting/straining/Walking-Indoors allowed/Bathing allowed/Walking-Outdoors allowed/Stairs allowed/Showering allowed

## 2017-02-09 NOTE — DISCHARGE NOTE ADULT - HOSPITAL COURSE
46M POD 6 s/p resection of left posterior thigh mass presents complaining of worsening constipation with abdominal pain with associated urinary retention. Patient had been taking oxycodone for surgical pain control and developed severe constipation, has no had bowel movement in 1.5 weeks. No nausea/vomiting. Also has not been able to urinate and developed lower abdominal pain. Presented to ED yesterday for this issue where patient was straight cathed and sent home - retention was attributed to anesthesia according to patient. However retention returned and so placed in ED returned 1L of urine.    CT was significant for large rectal burden with rectal wall thickening and surrounding inflammation concerning for stercoral colitis. Patient was started on zosyn. Manual disimpaction was attempted in ED but was unsuccessful. GI was called and recommended a bowel regiment. pt had a bowel movment and symptoms improved. Pt had a trail of void ________________. pt discharged home after bowel function resumed and pain was controlled.

## 2017-02-09 NOTE — DISCHARGE NOTE ADULT - CARE PLAN
Principal Discharge DX:	Constipation  Goal:	maintain bowel regiment, limit narcotic use  Instructions for follow-up, activity and diet:	Follow up with  Principal Discharge DX:	Constipation  Goal:	maintain bowel regiment, limit narcotic use  Instructions for follow-up, activity and diet:	Follow up with Dr Rees regarding your constipation in 5-7 days. Follow up with [facility] in 7 days @ [phone]. Follow up with Dr. Sal regarding your thigh surgery. Diet- reg diet, Shower- yes you may shower, Activity- as tolerated.  Please limit your narcotic use.   Please continue your bowel regiment as directed by GI.  Notify your dr if you experience any nausea/ vomiting, fever, chills, abdominal pain, inability to have a bowel movement. Principal Discharge DX:	Constipation  Goal:	maintain bowel regiment, limit narcotic use  Instructions for follow-up, activity and diet:	Follow up with Dr Rees regarding your constipation in 5-7 days. Follow up with [facility] in 7 days @ [phone]. Follow up with Dr. Sal regarding your thigh surgery. Diet- reg diet, Shower- yes you may shower, Activity- as tolerated.  Please limit your narcotic use.   Please continue your bowel regiment as directed by GI.  Notify your dr if you experience any nausea/ vomiting, fever, chills, abdominal pain, inability to have a bowel movement. Principal Discharge DX:	Constipation  Goal:	maintain bowel regiment, limit narcotic use  Instructions for follow-up, activity and diet:	Follow up with Dr Rees regarding your constipation in 5-7 days. Follow up with [facility] in 7 days @ [phone]. Follow up with Dr. Sal regarding your thigh surgery. Diet- reg diet, Shower- yes you may shower, Activity- as tolerated.  Please limit your narcotic use.   Please continue your bowel regiment as directed by GI.  Notify your dr if you experience any nausea/ vomiting, fever, chills, abdominal pain, inability to have a bowel movement.

## 2017-02-09 NOTE — DISCHARGE NOTE ADULT - PATIENT PORTAL LINK FT
“You can access the FollowHealth Patient Portal, offered by [facility], by registering with the following website: [email or web address]”

## 2017-02-09 NOTE — DISCHARGE NOTE ADULT - CARE PROVIDERS DIRECT ADDRESSES
[email or web address]

## 2017-02-09 NOTE — DISCHARGE NOTE ADULT - MEDICATION SUMMARY - MEDICATIONS TO TAKE
I will START or STAY ON the medications listed below when I get home from the hospital:    oxyCODONE 10 mg oral tablet  -- 2 tab(s) by mouth every 4 hours, As Needed -for severe pain MDD:12  -- Indication: For pain control    acetaminophen 325 mg oral tablet  -- 2 tab(s) by mouth every 6 hours  -- Indication: For pain control    tamsulosin 0.4 mg oral capsule  -- 1 cap(s) by mouth once a day (at bedtime) MDD:1  -- Indication: For BPH     polyethylene glycol 3350 oral powder for reconstitution  -- 17 gram(s) by mouth once a day MDD:1  -- Indication: For Constipation

## 2017-02-09 NOTE — DISCHARGE NOTE ADULT - CARE PROVIDER_API CALL
Ralph Rees (MD), Surgery  [address] [place] [address]  Phone: [phone]  Fax: [phone]    Rafa Sal), Surgery  [address] [place] [address]  Phone: [phone]  Fax: [phone]

## 2017-02-09 NOTE — DISCHARGE NOTE ADULT - PLAN OF CARE
maintain bowel regiment, limit narcotic use Follow up with  Follow up with Dr Rees regarding your constipation in 5-7 days. Follow up with [facility] in 7 days @ [phone]. Follow up with Dr. Sal regarding your thigh surgery. Diet- reg diet, Shower- yes you may shower, Activity- as tolerated.  Please limit your narcotic use.   Please continue your bowel regiment as directed by GI.  Notify your dr if you experience any nausea/ vomiting, fever, chills, abdominal pain, inability to have a bowel movement.

## 2017-02-10 ENCOUNTER — EMERGENCY (EMERGENCY)
Facility: HOSPITAL | Age: 47
LOS: 1 days | Discharge: HOME | End: 2017-02-10
Attending: EMERGENCY MEDICINE | Admitting: EMERGENCY MEDICINE
Payer: MEDICAID

## 2017-02-10 VITALS
HEART RATE: 77 BPM | DIASTOLIC BLOOD PRESSURE: 76 MMHG | TEMPERATURE: 98 F | WEIGHT: 214.07 LBS | RESPIRATION RATE: 19 BRPM | SYSTOLIC BLOOD PRESSURE: 126 MMHG | OXYGEN SATURATION: 96 % | HEIGHT: 70 IN

## 2017-02-10 VITALS
OXYGEN SATURATION: 95 % | HEART RATE: 75 BPM | DIASTOLIC BLOOD PRESSURE: 77 MMHG | TEMPERATURE: 98 F | RESPIRATION RATE: 18 BRPM | SYSTOLIC BLOOD PRESSURE: 121 MMHG

## 2017-02-10 DIAGNOSIS — Z98.890 OTHER SPECIFIED POSTPROCEDURAL STATES: Chronic | ICD-10-CM

## 2017-02-10 DIAGNOSIS — R19.7 DIARRHEA, UNSPECIFIED: ICD-10-CM

## 2017-02-10 LAB
ALBUMIN SERPL ELPH-MCNC: 4.5 G/DL — SIGNIFICANT CHANGE UP (ref 3.3–5)
ALP SERPL-CCNC: 80 U/L — SIGNIFICANT CHANGE UP (ref 40–120)
ALT FLD-CCNC: 93 U/L RC — HIGH (ref 10–45)
ANION GAP SERPL CALC-SCNC: 14 MMOL/L — SIGNIFICANT CHANGE UP (ref 5–17)
AST SERPL-CCNC: 52 U/L — HIGH (ref 10–40)
BASOPHILS # BLD AUTO: 0 K/UL — SIGNIFICANT CHANGE UP (ref 0–0.2)
BASOPHILS NFR BLD AUTO: 0.3 % — SIGNIFICANT CHANGE UP (ref 0–2)
BILIRUB SERPL-MCNC: 0.4 MG/DL — SIGNIFICANT CHANGE UP (ref 0.2–1.2)
BUN SERPL-MCNC: 16 MG/DL — SIGNIFICANT CHANGE UP (ref 7–23)
CALCIUM SERPL-MCNC: 9.5 MG/DL — SIGNIFICANT CHANGE UP (ref 8.4–10.5)
CHLORIDE SERPL-SCNC: 103 MMOL/L — SIGNIFICANT CHANGE UP (ref 96–108)
CO2 SERPL-SCNC: 27 MMOL/L — SIGNIFICANT CHANGE UP (ref 22–31)
CREAT SERPL-MCNC: 0.94 MG/DL — SIGNIFICANT CHANGE UP (ref 0.5–1.3)
EOSINOPHIL # BLD AUTO: 0.2 K/UL — SIGNIFICANT CHANGE UP (ref 0–0.5)
EOSINOPHIL NFR BLD AUTO: 2.3 % — SIGNIFICANT CHANGE UP (ref 0–6)
GLUCOSE SERPL-MCNC: 111 MG/DL — HIGH (ref 70–99)
HCT VFR BLD CALC: 42.4 % — SIGNIFICANT CHANGE UP (ref 39–50)
HCT VFR BLD CALC: 42.6 % — SIGNIFICANT CHANGE UP (ref 39–50)
HGB BLD-MCNC: 14.6 G/DL — SIGNIFICANT CHANGE UP (ref 13–17)
HGB BLD-MCNC: 14.7 G/DL — SIGNIFICANT CHANGE UP (ref 13–17)
LYMPHOCYTES # BLD AUTO: 2.6 K/UL — SIGNIFICANT CHANGE UP (ref 1–3.3)
LYMPHOCYTES # BLD AUTO: 35.7 % — SIGNIFICANT CHANGE UP (ref 13–44)
MAGNESIUM SERPL-MCNC: 2.3 MG/DL — SIGNIFICANT CHANGE UP (ref 1.6–2.6)
MCHC RBC-ENTMCNC: 30.8 PG — SIGNIFICANT CHANGE UP (ref 27–34)
MCHC RBC-ENTMCNC: 31 PG — SIGNIFICANT CHANGE UP (ref 27–34)
MCHC RBC-ENTMCNC: 34.4 GM/DL — SIGNIFICANT CHANGE UP (ref 32–36)
MCHC RBC-ENTMCNC: 34.6 GM/DL — SIGNIFICANT CHANGE UP (ref 32–36)
MCV RBC AUTO: 89.5 FL — SIGNIFICANT CHANGE UP (ref 80–100)
MCV RBC AUTO: 89.6 FL — SIGNIFICANT CHANGE UP (ref 80–100)
MONOCYTES # BLD AUTO: 0.6 K/UL — SIGNIFICANT CHANGE UP (ref 0–0.9)
MONOCYTES NFR BLD AUTO: 8.1 % — SIGNIFICANT CHANGE UP (ref 2–14)
NEUTROPHILS # BLD AUTO: 3.9 K/UL — SIGNIFICANT CHANGE UP (ref 1.8–7.4)
NEUTROPHILS NFR BLD AUTO: 53.6 % — SIGNIFICANT CHANGE UP (ref 43–77)
PHOSPHATE SERPL-MCNC: 3.8 MG/DL — SIGNIFICANT CHANGE UP (ref 2.5–4.5)
PLATELET # BLD AUTO: 355 K/UL — SIGNIFICANT CHANGE UP (ref 150–400)
PLATELET # BLD AUTO: 395 K/UL — SIGNIFICANT CHANGE UP (ref 150–400)
POTASSIUM SERPL-MCNC: 4.1 MMOL/L — SIGNIFICANT CHANGE UP (ref 3.5–5.3)
POTASSIUM SERPL-SCNC: 4.1 MMOL/L — SIGNIFICANT CHANGE UP (ref 3.5–5.3)
PROT SERPL-MCNC: 7.9 G/DL — SIGNIFICANT CHANGE UP (ref 6–8.3)
RBC # BLD: 4.74 M/UL — SIGNIFICANT CHANGE UP (ref 4.2–5.8)
RBC # BLD: 4.76 M/UL — SIGNIFICANT CHANGE UP (ref 4.2–5.8)
RBC # FLD: 11 % — SIGNIFICANT CHANGE UP (ref 10.3–14.5)
RBC # FLD: 11.1 % — SIGNIFICANT CHANGE UP (ref 10.3–14.5)
SODIUM SERPL-SCNC: 144 MMOL/L — SIGNIFICANT CHANGE UP (ref 135–145)
WBC # BLD: 6.4 K/UL — SIGNIFICANT CHANGE UP (ref 3.8–10.5)
WBC # BLD: 7.4 K/UL — SIGNIFICANT CHANGE UP (ref 3.8–10.5)
WBC # FLD AUTO: 6.4 K/UL — SIGNIFICANT CHANGE UP (ref 3.8–10.5)
WBC # FLD AUTO: 7.4 K/UL — SIGNIFICANT CHANGE UP (ref 3.8–10.5)

## 2017-02-10 PROCEDURE — 82330 ASSAY OF CALCIUM: CPT

## 2017-02-10 PROCEDURE — 51702 INSERT TEMP BLADDER CATH: CPT

## 2017-02-10 PROCEDURE — 99283 EMERGENCY DEPT VISIT LOW MDM: CPT

## 2017-02-10 PROCEDURE — 96375 TX/PRO/DX INJ NEW DRUG ADDON: CPT | Mod: XU

## 2017-02-10 PROCEDURE — 87086 URINE CULTURE/COLONY COUNT: CPT

## 2017-02-10 PROCEDURE — 74020: CPT

## 2017-02-10 PROCEDURE — 82947 ASSAY GLUCOSE BLOOD QUANT: CPT

## 2017-02-10 PROCEDURE — 81001 URINALYSIS AUTO W/SCOPE: CPT

## 2017-02-10 PROCEDURE — 80053 COMPREHEN METABOLIC PANEL: CPT

## 2017-02-10 PROCEDURE — 84295 ASSAY OF SERUM SODIUM: CPT

## 2017-02-10 PROCEDURE — 99284 EMERGENCY DEPT VISIT MOD MDM: CPT

## 2017-02-10 PROCEDURE — 85730 THROMBOPLASTIN TIME PARTIAL: CPT

## 2017-02-10 PROCEDURE — 82803 BLOOD GASES ANY COMBINATION: CPT

## 2017-02-10 PROCEDURE — 74177 CT ABD & PELVIS W/CONTRAST: CPT

## 2017-02-10 PROCEDURE — 82435 ASSAY OF BLOOD CHLORIDE: CPT

## 2017-02-10 PROCEDURE — 96374 THER/PROPH/DIAG INJ IV PUSH: CPT | Mod: XU

## 2017-02-10 PROCEDURE — 85610 PROTHROMBIN TIME: CPT

## 2017-02-10 PROCEDURE — 85027 COMPLETE CBC AUTOMATED: CPT

## 2017-02-10 PROCEDURE — 83605 ASSAY OF LACTIC ACID: CPT

## 2017-02-10 PROCEDURE — 96376 TX/PRO/DX INJ SAME DRUG ADON: CPT | Mod: XU

## 2017-02-10 PROCEDURE — 80048 BASIC METABOLIC PNL TOTAL CA: CPT

## 2017-02-10 PROCEDURE — 99285 EMERGENCY DEPT VISIT HI MDM: CPT | Mod: 25

## 2017-02-10 PROCEDURE — 84132 ASSAY OF SERUM POTASSIUM: CPT

## 2017-02-10 PROCEDURE — 85014 HEMATOCRIT: CPT

## 2017-02-10 PROCEDURE — 83735 ASSAY OF MAGNESIUM: CPT

## 2017-02-10 PROCEDURE — 84100 ASSAY OF PHOSPHORUS: CPT

## 2017-02-10 RX ADMIN — POLYETHYLENE GLYCOL 3350 17 GRAM(S): 17 POWDER, FOR SOLUTION ORAL at 05:39

## 2017-02-10 NOTE — ED PROVIDER NOTE - PROGRESS NOTE DETAILS
pts bowel movement formed - not watery . not foul smelling. abd soft nt. no fever. no leukocytosis. chance of cdiff very low. Will d/c with follow up Parminder Gutierrez M.D.

## 2017-02-10 NOTE — ED PROVIDER NOTE - MEDICAL DECISION MAKING DETAILS
46M past hx cdiff and recent surgery/abx use presents with watery diarrhea. Will obtain labs cdiff culture likely oral abx and d/c home. Pa: 46M past hx cdiff and recent surgery/abx use presents with watery diarrhea. Will obtain labs cdiff culture likely oral abx and d/c home as this is likely post obstructive diarrhea 2/2 initial narcotic induced constipation followed by miralax.

## 2017-02-10 NOTE — ED PROVIDER NOTE - CARE PLAN
Principal Discharge DX:	Diarrhea  Instructions for follow-up, activity and diet:	1. return for worsening symptoms or anything concerning to you  2. take all home meds as prescribed  3. follow up with your pmd call to make an appointment

## 2017-02-10 NOTE — ED ADULT TRIAGE NOTE - CHIEF COMPLAINT QUOTE
diarrhea x today, s/p benign tumor resection from L lower leg feb 1, 2016 by dr moore, initially was constipated, was receiving miralax while in hospital, was d/jacinta home today, now with multiple bouts of diarrhea, hx cdiff 5 yrs ago, received abx while in hospital for rectal inflammation

## 2017-02-10 NOTE — ED ADULT NURSE NOTE - OBJECTIVE STATEMENT
46 year old male with Hx of of Cdiff (5+ years ago) presented to the ED complaining of diarrhea after taking antibiotics for an access. As per patient he was treated here for an abscess with IV antibiotics yesterday and was constipated. When constipated he was given a fleet enema, stool softeners, and Miralax while at [facility]. As per patient he had regular bowel movements and then today he started having diarrhea today and is concerned about Cdif because he's had it in the past. Pt is A&O x 4, VSS, afebrile, ambulates independently and denies fever, chills, NVD. As per Pt he's had at least 4 episodes of diarrhea today and some of it has had formed feces in it.

## 2017-02-10 NOTE — ED PROVIDER NOTE - OBJECTIVE STATEMENT
46M past history of cdiff and recent benign mass resection in the hospital 2x in the past week and abx presents with diarrhea. Pt was constipated after his surgery has been treated with miralax. Today woke up and has had multiple episodes of watery diarrhea and abdominal cramping. no fevers. nbnb. Says he can go to the bathroom every 10 minutes. no 0/10 pain. concerned about the diarrhea given his past cdiff

## 2017-02-11 VITALS
HEART RATE: 60 BPM | RESPIRATION RATE: 18 BRPM | OXYGEN SATURATION: 98 % | SYSTOLIC BLOOD PRESSURE: 129 MMHG | DIASTOLIC BLOOD PRESSURE: 82 MMHG | TEMPERATURE: 98 F

## 2017-02-14 DIAGNOSIS — Z86.19 PERSONAL HISTORY OF OTHER INFECTIOUS AND PARASITIC DISEASES: ICD-10-CM

## 2017-02-14 DIAGNOSIS — K59.03 DRUG INDUCED CONSTIPATION: ICD-10-CM

## 2017-02-14 DIAGNOSIS — T40.2X5A ADVERSE EFFECT OF OTHER OPIOIDS, INITIAL ENCOUNTER: ICD-10-CM

## 2017-02-14 DIAGNOSIS — K52.89 OTHER SPECIFIED NONINFECTIVE GASTROENTERITIS AND COLITIS: ICD-10-CM

## 2017-02-14 DIAGNOSIS — R33.9 RETENTION OF URINE, UNSPECIFIED: ICD-10-CM

## 2017-02-14 DIAGNOSIS — L98.429 NON-PRESSURE CHRONIC ULCER OF BACK WITH UNSPECIFIED SEVERITY: ICD-10-CM

## 2017-02-14 DIAGNOSIS — I10 ESSENTIAL (PRIMARY) HYPERTENSION: ICD-10-CM

## 2017-02-16 ENCOUNTER — LABORATORY RESULT (OUTPATIENT)
Age: 47
End: 2017-02-16

## 2017-02-16 ENCOUNTER — APPOINTMENT (OUTPATIENT)
Dept: INTERNAL MEDICINE | Facility: CLINIC | Age: 47
End: 2017-02-16

## 2017-02-16 ENCOUNTER — OUTPATIENT (OUTPATIENT)
Dept: OUTPATIENT SERVICES | Facility: HOSPITAL | Age: 47
LOS: 1 days | End: 2017-02-16
Payer: COMMERCIAL

## 2017-02-16 VITALS
HEIGHT: 70 IN | DIASTOLIC BLOOD PRESSURE: 70 MMHG | BODY MASS INDEX: 29.2 KG/M2 | HEART RATE: 100 BPM | WEIGHT: 204 LBS | SYSTOLIC BLOOD PRESSURE: 104 MMHG

## 2017-02-16 DIAGNOSIS — Z98.890 OTHER SPECIFIED POSTPROCEDURAL STATES: Chronic | ICD-10-CM

## 2017-02-16 DIAGNOSIS — R19.7 DIARRHEA, UNSPECIFIED: ICD-10-CM

## 2017-02-16 DIAGNOSIS — I10 ESSENTIAL (PRIMARY) HYPERTENSION: ICD-10-CM

## 2017-02-16 LAB
HCT VFR BLD CALC: 44.5 % — SIGNIFICANT CHANGE UP (ref 39–50)
HGB BLD-MCNC: 14.9 G/DL — SIGNIFICANT CHANGE UP (ref 13–17)
MCHC RBC-ENTMCNC: 30.7 PG — SIGNIFICANT CHANGE UP (ref 27–34)
MCHC RBC-ENTMCNC: 33.5 GM/DL — SIGNIFICANT CHANGE UP (ref 32–36)
MCV RBC AUTO: 91.6 FL — SIGNIFICANT CHANGE UP (ref 80–100)
PLATELET # BLD AUTO: 481 K/UL — HIGH (ref 150–400)
RBC # BLD: 4.86 M/UL — SIGNIFICANT CHANGE UP (ref 4.2–5.8)
RBC # FLD: 12.7 % — SIGNIFICANT CHANGE UP (ref 10.3–14.5)
WBC # BLD: 7.41 K/UL — SIGNIFICANT CHANGE UP (ref 3.8–10.5)
WBC # FLD AUTO: 7.41 K/UL — SIGNIFICANT CHANGE UP (ref 3.8–10.5)

## 2017-02-17 ENCOUNTER — LABORATORY RESULT (OUTPATIENT)
Age: 47
End: 2017-02-17

## 2017-02-17 ENCOUNTER — APPOINTMENT (OUTPATIENT)
Dept: PLASTIC SURGERY | Facility: CLINIC | Age: 47
End: 2017-02-17

## 2017-02-17 PROCEDURE — 87177 OVA AND PARASITES SMEARS: CPT

## 2017-02-17 PROCEDURE — 87045 FECES CULTURE AEROBIC BACT: CPT

## 2017-02-17 PROCEDURE — 86256 FLUORESCENT ANTIBODY TITER: CPT

## 2017-02-17 PROCEDURE — 86364 TISS TRNSGLTMNASE EA IG CLAS: CPT

## 2017-02-17 PROCEDURE — 87493 C DIFF AMPLIFIED PROBE: CPT

## 2017-02-17 PROCEDURE — 85027 COMPLETE CBC AUTOMATED: CPT

## 2017-02-17 PROCEDURE — 86231 EMA EACH IG CLASS: CPT

## 2017-02-17 PROCEDURE — 87046 STOOL CULTR AEROBIC BACT EA: CPT

## 2017-02-18 LAB
C DIFF BY PCR RESULT: SIGNIFICANT CHANGE UP
C DIFF TOX GENS STL QL NAA+PROBE: SIGNIFICANT CHANGE UP
TTG IGA SER-ACNC: <5 UNITS — SIGNIFICANT CHANGE UP
TTG IGA SER-ACNC: NEGATIVE — SIGNIFICANT CHANGE UP
TTG IGG SER IA-ACNC: NEGATIVE — SIGNIFICANT CHANGE UP
TTG IGG SER-ACNC: <5 UNITS — SIGNIFICANT CHANGE UP

## 2017-02-19 LAB
ENDOMYSIUM IGA TITR SER IF: NEGATIVE — SIGNIFICANT CHANGE UP
ENDOMYSIUM IGA TITR SER: SIGNIFICANT CHANGE UP

## 2017-02-20 LAB
CULTURE RESULTS: SIGNIFICANT CHANGE UP
SPECIMEN SOURCE: SIGNIFICANT CHANGE UP

## 2017-02-21 LAB
CULTURE RESULTS: SIGNIFICANT CHANGE UP
SPECIMEN SOURCE: SIGNIFICANT CHANGE UP

## 2017-02-28 ENCOUNTER — APPOINTMENT (OUTPATIENT)
Dept: INTERNAL MEDICINE | Facility: CLINIC | Age: 47
End: 2017-02-28

## 2017-03-03 ENCOUNTER — APPOINTMENT (OUTPATIENT)
Dept: PLASTIC SURGERY | Facility: CLINIC | Age: 47
End: 2017-03-03

## 2017-03-07 ENCOUNTER — APPOINTMENT (OUTPATIENT)
Dept: SPINE | Facility: CLINIC | Age: 47
End: 2017-03-07

## 2017-03-07 VITALS
WEIGHT: 205 LBS | DIASTOLIC BLOOD PRESSURE: 74 MMHG | HEART RATE: 80 BPM | SYSTOLIC BLOOD PRESSURE: 115 MMHG | HEIGHT: 70 IN | BODY MASS INDEX: 29.35 KG/M2

## 2017-03-20 ENCOUNTER — APPOINTMENT (OUTPATIENT)
Dept: GASTROENTEROLOGY | Facility: CLINIC | Age: 47
End: 2017-03-20

## 2017-03-20 VITALS
HEIGHT: 70 IN | SYSTOLIC BLOOD PRESSURE: 136 MMHG | TEMPERATURE: 98.1 F | DIASTOLIC BLOOD PRESSURE: 84 MMHG | OXYGEN SATURATION: 95 % | WEIGHT: 210 LBS | BODY MASS INDEX: 30.06 KG/M2 | RESPIRATION RATE: 14 BRPM | HEART RATE: 71 BPM

## 2017-03-23 ENCOUNTER — MESSAGE (OUTPATIENT)
Age: 47
End: 2017-03-23

## 2017-03-23 RX ORDER — SODIUM SULFATE, POTASSIUM SULFATE, MAGNESIUM SULFATE 17.5; 3.13; 1.6 G/ML; G/ML; G/ML
17.5-3.13-1.6 SOLUTION, CONCENTRATE ORAL
Qty: 1 | Refills: 0 | Status: DISCONTINUED | COMMUNITY
Start: 2017-03-20 | End: 2017-03-23

## 2017-04-14 ENCOUNTER — APPOINTMENT (OUTPATIENT)
Dept: PLASTIC SURGERY | Facility: CLINIC | Age: 47
End: 2017-04-14

## 2017-06-12 ENCOUNTER — CHART COPY (OUTPATIENT)
Age: 47
End: 2017-06-12

## 2017-06-13 ENCOUNTER — MESSAGE (OUTPATIENT)
Age: 47
End: 2017-06-13

## 2017-06-13 ENCOUNTER — APPOINTMENT (OUTPATIENT)
Dept: GASTROENTEROLOGY | Facility: AMBULATORY MEDICAL SERVICES | Age: 47
End: 2017-06-13

## 2017-08-14 ENCOUNTER — EMERGENCY (EMERGENCY)
Facility: HOSPITAL | Age: 47
LOS: 1 days | Discharge: HOME | End: 2017-08-14
Attending: EMERGENCY MEDICINE | Admitting: EMERGENCY MEDICINE
Payer: COMMERCIAL

## 2017-08-14 DIAGNOSIS — Z98.890 OTHER SPECIFIED POSTPROCEDURAL STATES: Chronic | ICD-10-CM

## 2017-08-14 PROCEDURE — 99283 EMERGENCY DEPT VISIT LOW MDM: CPT | Mod: 25

## 2017-08-14 PROCEDURE — 65222 REMOVE FOREIGN BODY FROM EYE: CPT | Mod: LT

## 2017-08-14 PROCEDURE — 65222 REMOVE FOREIGN BODY FROM EYE: CPT

## 2017-08-15 ENCOUNTER — APPOINTMENT (OUTPATIENT)
Dept: OPHTHALMOLOGY | Facility: CLINIC | Age: 47
End: 2017-08-15

## 2017-08-15 ENCOUNTER — OUTPATIENT (OUTPATIENT)
Dept: OUTPATIENT SERVICES | Facility: HOSPITAL | Age: 47
LOS: 1 days | End: 2017-08-15

## 2017-08-15 DIAGNOSIS — Z98.890 OTHER SPECIFIED POSTPROCEDURAL STATES: Chronic | ICD-10-CM

## 2017-08-15 RX ORDER — TETANUS TOXOID, REDUCED DIPHTHERIA TOXOID AND ACELLULAR PERTUSSIS VACCINE, ADSORBED 5; 2.5; 8; 8; 2.5 [IU]/.5ML; [IU]/.5ML; UG/.5ML; UG/.5ML; UG/.5ML
0.5 SUSPENSION INTRAMUSCULAR ONCE
Qty: 0 | Refills: 0 | Status: DISCONTINUED | OUTPATIENT
Start: 2017-08-15 | End: 2017-08-18

## 2017-08-21 ENCOUNTER — APPOINTMENT (OUTPATIENT)
Dept: SURGICAL ONCOLOGY | Facility: CLINIC | Age: 47
End: 2017-08-21
Payer: COMMERCIAL

## 2017-08-21 VITALS
HEART RATE: 75 BPM | RESPIRATION RATE: 15 BRPM | HEIGHT: 70 IN | SYSTOLIC BLOOD PRESSURE: 119 MMHG | DIASTOLIC BLOOD PRESSURE: 75 MMHG | WEIGHT: 212 LBS | BODY MASS INDEX: 30.35 KG/M2 | OXYGEN SATURATION: 96 %

## 2017-08-21 DIAGNOSIS — Z80.0 FAMILY HISTORY OF MALIGNANT NEOPLASM OF DIGESTIVE ORGANS: ICD-10-CM

## 2017-08-21 DIAGNOSIS — Z80.9 FAMILY HISTORY OF MALIGNANT NEOPLASM, UNSPECIFIED: ICD-10-CM

## 2017-08-21 PROCEDURE — 99213 OFFICE O/P EST LOW 20 MIN: CPT

## 2017-08-21 RX ORDER — POLYETHYLENE GLYCOL 3350, SODIUM CHLORIDE, POTASSIUM CHLORIDE, SODIUM BICARBONATE, AND SODIUM SULFATE 240; 5.84; 2.98; 6.72; 22.72 G/4L; G/4L; G/4L; G/4L; G/4L
240 POWDER, FOR SOLUTION ORAL
Qty: 1 | Refills: 0 | Status: DISCONTINUED | COMMUNITY
Start: 2017-03-23 | End: 2017-08-21

## 2017-08-22 ENCOUNTER — APPOINTMENT (OUTPATIENT)
Dept: OPHTHALMOLOGY | Facility: CLINIC | Age: 47
End: 2017-08-22

## 2017-08-26 ENCOUNTER — FORM ENCOUNTER (OUTPATIENT)
Age: 47
End: 2017-08-26

## 2017-08-27 ENCOUNTER — OUTPATIENT (OUTPATIENT)
Dept: OUTPATIENT SERVICES | Facility: HOSPITAL | Age: 47
LOS: 1 days | End: 2017-08-27
Payer: COMMERCIAL

## 2017-08-27 ENCOUNTER — APPOINTMENT (OUTPATIENT)
Dept: MRI IMAGING | Facility: CLINIC | Age: 47
End: 2017-08-27
Payer: COMMERCIAL

## 2017-08-27 DIAGNOSIS — Z98.890 OTHER SPECIFIED POSTPROCEDURAL STATES: Chronic | ICD-10-CM

## 2017-08-27 DIAGNOSIS — R22.42 LOCALIZED SWELLING, MASS AND LUMP, LEFT LOWER LIMB: ICD-10-CM

## 2017-08-27 PROCEDURE — A9585: CPT

## 2017-08-27 PROCEDURE — 73723 MRI JOINT LWR EXTR W/O&W/DYE: CPT | Mod: 26,LT

## 2017-08-27 PROCEDURE — 73723 MRI JOINT LWR EXTR W/O&W/DYE: CPT

## 2017-08-29 ENCOUNTER — EMERGENCY (EMERGENCY)
Facility: HOSPITAL | Age: 47
LOS: 1 days | Discharge: HOME | End: 2017-08-29
Attending: EMERGENCY MEDICINE | Admitting: EMERGENCY MEDICINE
Payer: COMMERCIAL

## 2017-08-29 VITALS
OXYGEN SATURATION: 97 % | SYSTOLIC BLOOD PRESSURE: 123 MMHG | DIASTOLIC BLOOD PRESSURE: 76 MMHG | RESPIRATION RATE: 17 BRPM | HEART RATE: 69 BPM | TEMPERATURE: 98 F

## 2017-08-29 VITALS
RESPIRATION RATE: 16 BRPM | SYSTOLIC BLOOD PRESSURE: 131 MMHG | TEMPERATURE: 99 F | WEIGHT: 214.95 LBS | HEIGHT: 70 IN | DIASTOLIC BLOOD PRESSURE: 76 MMHG | OXYGEN SATURATION: 95 % | HEART RATE: 66 BPM

## 2017-08-29 DIAGNOSIS — Z98.890 OTHER SPECIFIED POSTPROCEDURAL STATES: Chronic | ICD-10-CM

## 2017-08-29 PROCEDURE — 99282 EMERGENCY DEPT VISIT SF MDM: CPT

## 2017-08-29 NOTE — ED PROVIDER NOTE - NS_ ATTENDINGSCRIBEDETAILS _ED_A_ED_FT
Ricky Blount MD - The scribe's documentation has been prepared under my direction and personally reviewed by me in its entirety. I confirm that the note above accurately reflects all work, treatment, procedures, and medical decision making performed by me.

## 2017-08-29 NOTE — ED PROVIDER NOTE - OBJECTIVE STATEMENT
47 y/o M pt presents to the ED c/o foreign body in right eye. Pt states he was in the attic and felt something in his right eye that irritated approximately 2 hours ago. Pt states now his eye is just tearing and he feels something on the outer aspect of the eye. Denies other discharge, fever, blurred vision. No further complaints at this time.

## 2017-08-29 NOTE — ED ADULT NURSE NOTE - CHPI ED SYMPTOMS NEG
no discharge/no photophobia/no pain/no itching/no double vision/no drainage/no blurred vision/no purulent drainage/no eye lid swelling

## 2017-08-29 NOTE — ED ADULT NURSE NOTE - OBJECTIVE STATEMENT
Pt presents with complaint of foreign body in right eye after working in his attic prior to arrival. No foreign body noted.conjunctiva reddened. No drainage noted

## 2017-08-30 DIAGNOSIS — T15.02XA FOREIGN BODY IN CORNEA, LEFT EYE, INITIAL ENCOUNTER: ICD-10-CM

## 2017-09-01 ENCOUNTER — APPOINTMENT (OUTPATIENT)
Dept: INTERNAL MEDICINE | Facility: CLINIC | Age: 47
End: 2017-09-01

## 2017-12-01 ENCOUNTER — APPOINTMENT (OUTPATIENT)
Dept: INTERNAL MEDICINE | Facility: CLINIC | Age: 47
End: 2017-12-01

## 2017-12-07 ENCOUNTER — OUTPATIENT (OUTPATIENT)
Dept: OUTPATIENT SERVICES | Facility: HOSPITAL | Age: 47
LOS: 1 days | End: 2017-12-07
Payer: COMMERCIAL

## 2017-12-07 ENCOUNTER — LABORATORY RESULT (OUTPATIENT)
Age: 47
End: 2017-12-07

## 2017-12-07 ENCOUNTER — APPOINTMENT (OUTPATIENT)
Dept: INTERNAL MEDICINE | Facility: CLINIC | Age: 47
End: 2017-12-07
Payer: COMMERCIAL

## 2017-12-07 VITALS
DIASTOLIC BLOOD PRESSURE: 80 MMHG | HEIGHT: 70 IN | BODY MASS INDEX: 30.64 KG/M2 | WEIGHT: 214 LBS | SYSTOLIC BLOOD PRESSURE: 140 MMHG

## 2017-12-07 DIAGNOSIS — I10 ESSENTIAL (PRIMARY) HYPERTENSION: ICD-10-CM

## 2017-12-07 DIAGNOSIS — Z98.890 OTHER SPECIFIED POSTPROCEDURAL STATES: Chronic | ICD-10-CM

## 2017-12-07 LAB
HBA1C BLD-MCNC: 5.6 % — SIGNIFICANT CHANGE UP (ref 4–5.6)
HCT VFR BLD CALC: 44.2 % — SIGNIFICANT CHANGE UP (ref 39–50)
HGB BLD-MCNC: 14.5 G/DL — SIGNIFICANT CHANGE UP (ref 13–17)
MCHC RBC-ENTMCNC: 29.8 PG — SIGNIFICANT CHANGE UP (ref 27–34)
MCHC RBC-ENTMCNC: 32.8 GM/DL — SIGNIFICANT CHANGE UP (ref 32–36)
MCV RBC AUTO: 90.9 FL — SIGNIFICANT CHANGE UP (ref 80–100)
PLATELET # BLD AUTO: 313 K/UL — SIGNIFICANT CHANGE UP (ref 150–400)
RBC # BLD: 4.86 M/UL — SIGNIFICANT CHANGE UP (ref 4.2–5.8)
RBC # FLD: 12.5 % — SIGNIFICANT CHANGE UP (ref 10.3–14.5)
WBC # BLD: 6.37 K/UL — SIGNIFICANT CHANGE UP (ref 3.8–10.5)
WBC # FLD AUTO: 6.37 K/UL — SIGNIFICANT CHANGE UP (ref 3.8–10.5)

## 2017-12-07 PROCEDURE — G0008: CPT

## 2017-12-07 PROCEDURE — 80048 BASIC METABOLIC PNL TOTAL CA: CPT

## 2017-12-07 PROCEDURE — 87389 HIV-1 AG W/HIV-1&-2 AB AG IA: CPT

## 2017-12-07 PROCEDURE — 99213 OFFICE O/P EST LOW 20 MIN: CPT | Mod: GE

## 2017-12-07 PROCEDURE — 85027 COMPLETE CBC AUTOMATED: CPT

## 2017-12-07 PROCEDURE — G0463: CPT

## 2017-12-07 PROCEDURE — 80061 LIPID PANEL: CPT

## 2017-12-07 PROCEDURE — 90688 IIV4 VACCINE SPLT 0.5 ML IM: CPT

## 2017-12-07 PROCEDURE — 83036 HEMOGLOBIN GLYCOSYLATED A1C: CPT

## 2017-12-08 LAB
ANION GAP SERPL CALC-SCNC: 14 MMOL/L — SIGNIFICANT CHANGE UP (ref 5–17)
BUN SERPL-MCNC: 18 MG/DL — SIGNIFICANT CHANGE UP (ref 7–23)
CALCIUM SERPL-MCNC: 10 MG/DL — SIGNIFICANT CHANGE UP (ref 8.4–10.5)
CHLORIDE SERPL-SCNC: 104 MMOL/L — SIGNIFICANT CHANGE UP (ref 96–108)
CHOLEST SERPL-MCNC: 237 MG/DL — HIGH (ref 10–199)
CO2 SERPL-SCNC: 27 MMOL/L — SIGNIFICANT CHANGE UP (ref 22–31)
CREAT SERPL-MCNC: 1.12 MG/DL — SIGNIFICANT CHANGE UP (ref 0.5–1.3)
GLUCOSE SERPL-MCNC: 66 MG/DL — LOW (ref 70–99)
HDLC SERPL-MCNC: 44 MG/DL — SIGNIFICANT CHANGE UP (ref 40–125)
HIV 1+2 AB+HIV1 P24 AG SERPL QL IA: SIGNIFICANT CHANGE UP
LIPID PNL WITH DIRECT LDL SERPL: 113 MG/DL — SIGNIFICANT CHANGE UP
POTASSIUM SERPL-MCNC: 4.6 MMOL/L — SIGNIFICANT CHANGE UP (ref 3.5–5.3)
POTASSIUM SERPL-SCNC: 4.6 MMOL/L — SIGNIFICANT CHANGE UP (ref 3.5–5.3)
SODIUM SERPL-SCNC: 145 MMOL/L — SIGNIFICANT CHANGE UP (ref 135–145)
TOTAL CHOLESTEROL/HDL RATIO MEASUREMENT: 5.4 RATIO — SIGNIFICANT CHANGE UP (ref 3.4–9.6)
TRIGL SERPL-MCNC: 399 MG/DL — HIGH (ref 10–149)

## 2017-12-15 ENCOUNTER — FORM ENCOUNTER (OUTPATIENT)
Age: 47
End: 2017-12-15

## 2017-12-15 DIAGNOSIS — Z23 ENCOUNTER FOR IMMUNIZATION: ICD-10-CM

## 2017-12-15 DIAGNOSIS — R14.0 ABDOMINAL DISTENSION (GASEOUS): ICD-10-CM

## 2017-12-15 DIAGNOSIS — K64.9 UNSPECIFIED HEMORRHOIDS: ICD-10-CM

## 2017-12-16 ENCOUNTER — APPOINTMENT (OUTPATIENT)
Dept: RADIOLOGY | Facility: CLINIC | Age: 47
End: 2017-12-16
Payer: COMMERCIAL

## 2017-12-16 ENCOUNTER — OUTPATIENT (OUTPATIENT)
Dept: OUTPATIENT SERVICES | Facility: HOSPITAL | Age: 47
LOS: 1 days | End: 2017-12-16
Payer: COMMERCIAL

## 2017-12-16 ENCOUNTER — APPOINTMENT (OUTPATIENT)
Dept: MRI IMAGING | Facility: CLINIC | Age: 47
End: 2017-12-16
Payer: COMMERCIAL

## 2017-12-16 DIAGNOSIS — Z98.890 OTHER SPECIFIED POSTPROCEDURAL STATES: Chronic | ICD-10-CM

## 2017-12-16 DIAGNOSIS — Z00.8 ENCOUNTER FOR OTHER GENERAL EXAMINATION: ICD-10-CM

## 2017-12-16 PROCEDURE — A9585: CPT

## 2017-12-16 PROCEDURE — 71046 X-RAY EXAM CHEST 2 VIEWS: CPT

## 2017-12-16 PROCEDURE — 73723 MRI JOINT LWR EXTR W/O&W/DYE: CPT

## 2017-12-16 PROCEDURE — 71020: CPT | Mod: 26

## 2017-12-16 PROCEDURE — 73723 MRI JOINT LWR EXTR W/O&W/DYE: CPT | Mod: 26,LT

## 2018-01-15 ENCOUNTER — INPATIENT (INPATIENT)
Facility: HOSPITAL | Age: 48
LOS: 0 days | Discharge: HOME | DRG: 355 | End: 2018-01-16
Attending: SPECIALIST | Admitting: SPECIALIST
Payer: COMMERCIAL

## 2018-01-15 VITALS
HEART RATE: 69 BPM | HEIGHT: 70 IN | DIASTOLIC BLOOD PRESSURE: 71 MMHG | TEMPERATURE: 98 F | SYSTOLIC BLOOD PRESSURE: 123 MMHG | OXYGEN SATURATION: 96 % | RESPIRATION RATE: 16 BRPM | WEIGHT: 214.95 LBS

## 2018-01-15 DIAGNOSIS — Z98.890 OTHER SPECIFIED POSTPROCEDURAL STATES: Chronic | ICD-10-CM

## 2018-01-15 LAB
ALBUMIN SERPL ELPH-MCNC: 3.8 G/DL — SIGNIFICANT CHANGE UP (ref 3.3–5)
ALP SERPL-CCNC: 68 U/L — SIGNIFICANT CHANGE UP (ref 30–120)
ALT FLD-CCNC: 45 U/L DA — SIGNIFICANT CHANGE UP (ref 10–60)
AMYLASE P1 CFR SERPL: 50 U/L — SIGNIFICANT CHANGE UP (ref 25–125)
ANION GAP SERPL CALC-SCNC: 9 MMOL/L — SIGNIFICANT CHANGE UP (ref 5–17)
APPEARANCE UR: CLEAR — SIGNIFICANT CHANGE UP
APTT BLD: 29.6 SEC — SIGNIFICANT CHANGE UP (ref 27.5–37.4)
AST SERPL-CCNC: 26 U/L — SIGNIFICANT CHANGE UP (ref 10–40)
BASOPHILS # BLD AUTO: 0.1 K/UL — SIGNIFICANT CHANGE UP (ref 0–0.2)
BASOPHILS NFR BLD AUTO: 1 % — SIGNIFICANT CHANGE UP (ref 0–2)
BILIRUB SERPL-MCNC: 0.3 MG/DL — SIGNIFICANT CHANGE UP (ref 0.2–1.2)
BILIRUB UR-MCNC: NEGATIVE — SIGNIFICANT CHANGE UP
BUN SERPL-MCNC: 14 MG/DL — SIGNIFICANT CHANGE UP (ref 7–23)
CALCIUM SERPL-MCNC: 8.9 MG/DL — SIGNIFICANT CHANGE UP (ref 8.4–10.5)
CHLORIDE SERPL-SCNC: 106 MMOL/L — SIGNIFICANT CHANGE UP (ref 96–108)
CO2 SERPL-SCNC: 29 MMOL/L — SIGNIFICANT CHANGE UP (ref 22–31)
COLOR SPEC: YELLOW — SIGNIFICANT CHANGE UP
CREAT SERPL-MCNC: 0.85 MG/DL — SIGNIFICANT CHANGE UP (ref 0.5–1.3)
DIFF PNL FLD: NEGATIVE — SIGNIFICANT CHANGE UP
EOSINOPHIL # BLD AUTO: 0.3 K/UL — SIGNIFICANT CHANGE UP (ref 0–0.5)
EOSINOPHIL NFR BLD AUTO: 3.8 % — SIGNIFICANT CHANGE UP (ref 0–6)
GLUCOSE SERPL-MCNC: 109 MG/DL — HIGH (ref 70–99)
GLUCOSE UR QL: NEGATIVE MG/DL — SIGNIFICANT CHANGE UP
HCT VFR BLD CALC: 46.3 % — SIGNIFICANT CHANGE UP (ref 39–50)
HGB BLD-MCNC: 15.4 G/DL — SIGNIFICANT CHANGE UP (ref 13–17)
INR BLD: 1.01 RATIO — SIGNIFICANT CHANGE UP (ref 0.88–1.16)
KETONES UR-MCNC: NEGATIVE — SIGNIFICANT CHANGE UP
LEUKOCYTE ESTERASE UR-ACNC: NEGATIVE — SIGNIFICANT CHANGE UP
LIDOCAIN IGE QN: 83 U/L — SIGNIFICANT CHANGE UP (ref 73–393)
LYMPHOCYTES # BLD AUTO: 2.3 K/UL — SIGNIFICANT CHANGE UP (ref 1–3.3)
LYMPHOCYTES # BLD AUTO: 33.7 % — SIGNIFICANT CHANGE UP (ref 13–44)
MCHC RBC-ENTMCNC: 29.6 PG — SIGNIFICANT CHANGE UP (ref 27–34)
MCHC RBC-ENTMCNC: 33.2 GM/DL — SIGNIFICANT CHANGE UP (ref 32–36)
MCV RBC AUTO: 89 FL — SIGNIFICANT CHANGE UP (ref 80–100)
MONOCYTES # BLD AUTO: 0.5 K/UL — SIGNIFICANT CHANGE UP (ref 0–0.9)
MONOCYTES NFR BLD AUTO: 7.5 % — SIGNIFICANT CHANGE UP (ref 2–14)
NEUTROPHILS # BLD AUTO: 3.7 K/UL — SIGNIFICANT CHANGE UP (ref 1.8–7.4)
NEUTROPHILS NFR BLD AUTO: 54.1 % — SIGNIFICANT CHANGE UP (ref 43–77)
NITRITE UR-MCNC: NEGATIVE — SIGNIFICANT CHANGE UP
PH UR: 5 — SIGNIFICANT CHANGE UP (ref 5–8)
PLATELET # BLD AUTO: 306 K/UL — SIGNIFICANT CHANGE UP (ref 150–400)
POTASSIUM SERPL-MCNC: 3.6 MMOL/L — SIGNIFICANT CHANGE UP (ref 3.5–5.3)
POTASSIUM SERPL-SCNC: 3.6 MMOL/L — SIGNIFICANT CHANGE UP (ref 3.5–5.3)
PROT SERPL-MCNC: 7.5 G/DL — SIGNIFICANT CHANGE UP (ref 6–8.3)
PROT UR-MCNC: NEGATIVE MG/DL — SIGNIFICANT CHANGE UP
PROTHROM AB SERPL-ACNC: 11 SEC — SIGNIFICANT CHANGE UP (ref 9.8–12.7)
RBC # BLD: 5.2 M/UL — SIGNIFICANT CHANGE UP (ref 4.2–5.8)
RBC # FLD: 11.7 % — SIGNIFICANT CHANGE UP (ref 10.3–14.5)
SODIUM SERPL-SCNC: 144 MMOL/L — SIGNIFICANT CHANGE UP (ref 135–145)
SP GR SPEC: 1.02 — SIGNIFICANT CHANGE UP (ref 1.01–1.02)
UROBILINOGEN FLD QL: NEGATIVE MG/DL — SIGNIFICANT CHANGE UP
WBC # BLD: 6.9 K/UL — SIGNIFICANT CHANGE UP (ref 3.8–10.5)
WBC # FLD AUTO: 6.9 K/UL — SIGNIFICANT CHANGE UP (ref 3.8–10.5)

## 2018-01-15 PROCEDURE — 74177 CT ABD & PELVIS W/CONTRAST: CPT | Mod: 26

## 2018-01-15 PROCEDURE — 99285 EMERGENCY DEPT VISIT HI MDM: CPT

## 2018-01-15 RX ORDER — MORPHINE SULFATE 50 MG/1
2 CAPSULE, EXTENDED RELEASE ORAL ONCE
Qty: 0 | Refills: 0 | Status: DISCONTINUED | OUTPATIENT
Start: 2018-01-15 | End: 2018-01-15

## 2018-01-15 RX ORDER — IOHEXOL 300 MG/ML
30 INJECTION, SOLUTION INTRAVENOUS ONCE
Qty: 0 | Refills: 0 | Status: COMPLETED | OUTPATIENT
Start: 2018-01-15 | End: 2018-01-15

## 2018-01-15 RX ORDER — SODIUM CHLORIDE 9 MG/ML
1000 INJECTION INTRAMUSCULAR; INTRAVENOUS; SUBCUTANEOUS ONCE
Qty: 0 | Refills: 0 | Status: COMPLETED | OUTPATIENT
Start: 2018-01-15 | End: 2018-01-15

## 2018-01-15 RX ORDER — KETOROLAC TROMETHAMINE 30 MG/ML
30 SYRINGE (ML) INJECTION ONCE
Qty: 0 | Refills: 0 | Status: DISCONTINUED | OUTPATIENT
Start: 2018-01-15 | End: 2018-01-15

## 2018-01-15 RX ADMIN — SODIUM CHLORIDE 1000 MILLILITER(S): 9 INJECTION INTRAMUSCULAR; INTRAVENOUS; SUBCUTANEOUS at 17:40

## 2018-01-15 RX ADMIN — IOHEXOL 30 MILLILITER(S): 300 INJECTION, SOLUTION INTRAVENOUS at 17:57

## 2018-01-15 RX ADMIN — Medication 30 MILLIGRAM(S): at 17:38

## 2018-01-15 RX ADMIN — MORPHINE SULFATE 2 MILLIGRAM(S): 50 CAPSULE, EXTENDED RELEASE ORAL at 19:15

## 2018-01-15 RX ADMIN — MORPHINE SULFATE 2 MILLIGRAM(S): 50 CAPSULE, EXTENDED RELEASE ORAL at 22:24

## 2018-01-15 RX ADMIN — Medication 30 MILLIGRAM(S): at 17:53

## 2018-01-15 RX ADMIN — MORPHINE SULFATE 2 MILLIGRAM(S): 50 CAPSULE, EXTENDED RELEASE ORAL at 22:23

## 2018-01-15 NOTE — ED PROVIDER NOTE - OBJECTIVE STATEMENT
46 y/o M presents with c/o abdominal pain x 2 days. Pt states that he started having mild lower abdominal pain 2 days ago but got worse last night. Pt states that pain is constant. Denies fever, chills, n/v/d/constipation, hx of abdominal surgeries, testicular pain, urinary symptoms or other symptoms.

## 2018-01-15 NOTE — ED ADULT NURSE NOTE - ED STAT RN HANDOFF WHERE
patient on stretcher in hallway in ER just medicated with morphine for abdominal pain.  Patient to have CT scan done @20:15

## 2018-01-15 NOTE — ED ADULT NURSE NOTE - OBJECTIVE STATEMENT
Patient walked into ER c/o abdominal pain dale-umbilical for 2 days now.  Last night pain got worse.

## 2018-01-15 NOTE — ED ADULT NURSE REASSESSMENT NOTE - NS ED NURSE REASSESS COMMENT FT1
received report and assumed care of pt at change of shift. pt is awake and alert. NAD presently. awaits CT. visitors at bedside presently. vs as charted.

## 2018-01-15 NOTE — ED PROVIDER NOTE - MEDICAL DECISION MAKING DETAILS
48 yo m with diffuse lower abdominal pain x 2 days, worsening; no fever/n/v/d; will get labs, ua, ct a/p r/o AP, pain meds, re-assess

## 2018-01-16 ENCOUNTER — TRANSCRIPTION ENCOUNTER (OUTPATIENT)
Age: 48
End: 2018-01-16

## 2018-01-16 ENCOUNTER — RESULT REVIEW (OUTPATIENT)
Age: 48
End: 2018-01-16

## 2018-01-16 VITALS
SYSTOLIC BLOOD PRESSURE: 126 MMHG | DIASTOLIC BLOOD PRESSURE: 77 MMHG | OXYGEN SATURATION: 97 % | HEART RATE: 72 BPM | TEMPERATURE: 98 F | RESPIRATION RATE: 16 BRPM

## 2018-01-16 DIAGNOSIS — K42.0 UMBILICAL HERNIA WITH OBSTRUCTION, WITHOUT GANGRENE: ICD-10-CM

## 2018-01-16 PROBLEM — R22.42 LOCALIZED SWELLING, MASS AND LUMP, LEFT LOWER LIMB: Chronic | Status: ACTIVE | Noted: 2017-01-25

## 2018-01-16 PROCEDURE — 93010 ELECTROCARDIOGRAM REPORT: CPT

## 2018-01-16 PROCEDURE — 49587: CPT | Mod: AS

## 2018-01-16 PROCEDURE — 88302 TISSUE EXAM BY PATHOLOGIST: CPT | Mod: 26

## 2018-01-16 PROCEDURE — 71046 X-RAY EXAM CHEST 2 VIEWS: CPT | Mod: 26

## 2018-01-16 RX ORDER — SODIUM CHLORIDE 9 MG/ML
1000 INJECTION INTRAMUSCULAR; INTRAVENOUS; SUBCUTANEOUS
Qty: 0 | Refills: 0 | Status: DISCONTINUED | OUTPATIENT
Start: 2018-01-16 | End: 2018-01-16

## 2018-01-16 RX ORDER — KETOROLAC TROMETHAMINE 30 MG/ML
30 SYRINGE (ML) INJECTION EVERY 6 HOURS
Qty: 0 | Refills: 0 | Status: DISCONTINUED | OUTPATIENT
Start: 2018-01-16 | End: 2018-01-16

## 2018-01-16 RX ORDER — MORPHINE SULFATE 50 MG/1
2 CAPSULE, EXTENDED RELEASE ORAL ONCE
Qty: 0 | Refills: 0 | Status: DISCONTINUED | OUTPATIENT
Start: 2018-01-16 | End: 2018-01-16

## 2018-01-16 RX ORDER — MEPERIDINE HYDROCHLORIDE 50 MG/ML
12.5 INJECTION INTRAMUSCULAR; INTRAVENOUS; SUBCUTANEOUS
Qty: 0 | Refills: 0 | Status: DISCONTINUED | OUTPATIENT
Start: 2018-01-16 | End: 2018-01-16

## 2018-01-16 RX ORDER — CEFAZOLIN SODIUM 1 G
2000 VIAL (EA) INJECTION ONCE
Qty: 0 | Refills: 0 | Status: COMPLETED | OUTPATIENT
Start: 2018-01-16 | End: 2018-01-16

## 2018-01-16 RX ORDER — SODIUM CHLORIDE 9 MG/ML
1000 INJECTION, SOLUTION INTRAVENOUS
Qty: 0 | Refills: 0 | Status: DISCONTINUED | OUTPATIENT
Start: 2018-01-16 | End: 2018-01-16

## 2018-01-16 RX ORDER — OXYCODONE HYDROCHLORIDE 5 MG/1
5 TABLET ORAL EVERY 4 HOURS
Qty: 0 | Refills: 0 | Status: DISCONTINUED | OUTPATIENT
Start: 2018-01-16 | End: 2018-01-16

## 2018-01-16 RX ORDER — FENTANYL CITRATE 50 UG/ML
25 INJECTION INTRAVENOUS
Qty: 0 | Refills: 0 | Status: DISCONTINUED | OUTPATIENT
Start: 2018-01-16 | End: 2018-01-16

## 2018-01-16 RX ADMIN — MORPHINE SULFATE 2 MILLIGRAM(S): 50 CAPSULE, EXTENDED RELEASE ORAL at 02:38

## 2018-01-16 RX ADMIN — MORPHINE SULFATE 2 MILLIGRAM(S): 50 CAPSULE, EXTENDED RELEASE ORAL at 07:03

## 2018-01-16 RX ADMIN — MORPHINE SULFATE 2 MILLIGRAM(S): 50 CAPSULE, EXTENDED RELEASE ORAL at 02:20

## 2018-01-16 RX ADMIN — FENTANYL CITRATE 25 MICROGRAM(S): 50 INJECTION INTRAVENOUS at 15:45

## 2018-01-16 RX ADMIN — FENTANYL CITRATE 25 MICROGRAM(S): 50 INJECTION INTRAVENOUS at 15:11

## 2018-01-16 RX ADMIN — FENTANYL CITRATE 25 MICROGRAM(S): 50 INJECTION INTRAVENOUS at 15:34

## 2018-01-16 RX ADMIN — FENTANYL CITRATE 25 MICROGRAM(S): 50 INJECTION INTRAVENOUS at 15:20

## 2018-01-16 RX ADMIN — SODIUM CHLORIDE 50 MILLILITER(S): 9 INJECTION, SOLUTION INTRAVENOUS at 14:45

## 2018-01-16 RX ADMIN — SODIUM CHLORIDE 100 MILLILITER(S): 9 INJECTION INTRAMUSCULAR; INTRAVENOUS; SUBCUTANEOUS at 10:35

## 2018-01-16 RX ADMIN — Medication 30 MILLIGRAM(S): at 10:50

## 2018-01-16 RX ADMIN — Medication 30 MILLIGRAM(S): at 10:35

## 2018-01-16 RX ADMIN — MORPHINE SULFATE 2 MILLIGRAM(S): 50 CAPSULE, EXTENDED RELEASE ORAL at 06:46

## 2018-01-16 RX ADMIN — FENTANYL CITRATE 25 MICROGRAM(S): 50 INJECTION INTRAVENOUS at 15:27

## 2018-01-16 NOTE — DISCHARGE NOTE ADULT - CARE PROVIDER_API CALL
Renan Alvardao), [facility] [facility]  [address] [place] [address]  Phone: [phone]  Fax: [phone]

## 2018-01-16 NOTE — H&P ADULT - PROBLEM SELECTOR PLAN 1
repair in OR today The indication for, alternatives to and possible complications of the procedure were discussed with the patient, and all questions were answered

## 2018-01-16 NOTE — H&P ADULT - PMH
Anorectal abscess    History of Clostridium difficile colitis  2012  Mass of left thigh  neuroma excised posterior thigh

## 2018-01-16 NOTE — DISCHARGE NOTE ADULT - NS AS ACTIVITY OBS
Stairs allowed/No Heavy lifting/straining/Walking-Indoors allowed/Walking-Outdoors allowed/Do not make important decisions/OK to drive when safe and no pain/Showering allowed/Do not drive or operate machinery

## 2018-01-16 NOTE — DISCHARGE NOTE ADULT - PATIENT PORTAL LINK FT
“You can access the Follow[facility] Patient Portal, offered by [facility], by registering with the following website: [email or web address]”

## 2018-01-16 NOTE — DISCHARGE NOTE ADULT - CARE PLAN
Principal Discharge DX:	Umbilical hernia, incarcerated  Goal:	na  Assessment and plan of treatment:	as above

## 2018-01-16 NOTE — BRIEF OPERATIVE NOTE - PROCEDURE
<<-----Click on this checkbox to enter Procedure Umbil hernia repair NEC  01/16/2018  incarcerated  Active  [email or web address]

## 2018-01-19 ENCOUNTER — EMERGENCY (EMERGENCY)
Facility: HOSPITAL | Age: 48
LOS: 1 days | Discharge: HOME | End: 2018-01-19
Attending: EMERGENCY MEDICINE
Payer: COMMERCIAL

## 2018-01-19 ENCOUNTER — APPOINTMENT (OUTPATIENT)
Dept: INTERNAL MEDICINE | Facility: CLINIC | Age: 48
End: 2018-01-19

## 2018-01-19 VITALS
OXYGEN SATURATION: 97 % | HEART RATE: 85 BPM | TEMPERATURE: 99 F | DIASTOLIC BLOOD PRESSURE: 75 MMHG | WEIGHT: 214.95 LBS | SYSTOLIC BLOOD PRESSURE: 128 MMHG | RESPIRATION RATE: 20 BRPM

## 2018-01-19 VITALS
SYSTOLIC BLOOD PRESSURE: 119 MMHG | HEART RATE: 82 BPM | RESPIRATION RATE: 18 BRPM | DIASTOLIC BLOOD PRESSURE: 71 MMHG | OXYGEN SATURATION: 98 % | TEMPERATURE: 98 F

## 2018-01-19 DIAGNOSIS — Z98.890 OTHER SPECIFIED POSTPROCEDURAL STATES: Chronic | ICD-10-CM

## 2018-01-19 PROCEDURE — 94640 AIRWAY INHALATION TREATMENT: CPT

## 2018-01-19 PROCEDURE — 99284 EMERGENCY DEPT VISIT MOD MDM: CPT

## 2018-01-19 PROCEDURE — 71046 X-RAY EXAM CHEST 2 VIEWS: CPT | Mod: 26

## 2018-01-19 PROCEDURE — 71046 X-RAY EXAM CHEST 2 VIEWS: CPT

## 2018-01-19 RX ORDER — FLUTICASONE PROPIONATE 50 MCG
1 SPRAY, SUSPENSION NASAL
Qty: 1 | Refills: 0 | OUTPATIENT
Start: 2018-01-19 | End: 2018-01-25

## 2018-01-19 RX ORDER — FLUTICASONE PROPIONATE 50 MCG
1 SPRAY, SUSPENSION NASAL ONCE
Qty: 0 | Refills: 0 | Status: COMPLETED | OUTPATIENT
Start: 2018-01-19 | End: 2018-01-19

## 2018-01-19 RX ORDER — ALBUTEROL 90 UG/1
2.5 AEROSOL, METERED ORAL ONCE
Qty: 0 | Refills: 0 | Status: COMPLETED | OUTPATIENT
Start: 2018-01-19 | End: 2018-01-19

## 2018-01-19 RX ORDER — ACETAMINOPHEN 500 MG
975 TABLET ORAL ONCE
Qty: 0 | Refills: 0 | Status: COMPLETED | OUTPATIENT
Start: 2018-01-19 | End: 2018-01-19

## 2018-01-19 RX ORDER — ALBUTEROL 90 UG/1
2 AEROSOL, METERED ORAL
Qty: 1 | Refills: 0 | OUTPATIENT
Start: 2018-01-19 | End: 2018-02-17

## 2018-01-19 RX ADMIN — ALBUTEROL 2.5 MILLIGRAM(S): 90 AEROSOL, METERED ORAL at 12:19

## 2018-01-19 RX ADMIN — Medication 1 SPRAY(S): at 12:38

## 2018-01-19 RX ADMIN — Medication 975 MILLIGRAM(S): at 12:19

## 2018-01-19 NOTE — ED ADULT NURSE NOTE - CHPI ED SYMPTOMS NEG
no headache/no edema/no body aches/no chest pain/no chills/no hemoptysis/no fever/no shortness of breath/no diaphoresis

## 2018-01-19 NOTE — ED ADULT NURSE REASSESSMENT NOTE - NS ED NURSE REASSESS COMMENT FT1
Pharmacy contacted for flonase at 12:12 Pharmacy techspoken to, and they stated medication will be sent to station #[id number] when ready.

## 2018-01-19 NOTE — ED PROVIDER NOTE - MEDICAL DECISION MAKING DETAILS
attending Gus: Pt with cough and fever for several days after recent ambulatory surgery. On exam, low grade fever, lungs clear, MMM. Likely viral syndrome. Will obtain cxr r/o PNA, tylenol, flonase for nasal congestion, albuterol for symptomatic relief and dc with close PMD follow-up and strict return precautions.

## 2018-02-23 ENCOUNTER — APPOINTMENT (OUTPATIENT)
Dept: INTERNAL MEDICINE | Facility: CLINIC | Age: 48
End: 2018-02-23
Payer: COMMERCIAL

## 2018-02-23 ENCOUNTER — OUTPATIENT (OUTPATIENT)
Dept: OUTPATIENT SERVICES | Facility: HOSPITAL | Age: 48
LOS: 1 days | End: 2018-02-23
Payer: COMMERCIAL

## 2018-02-23 VITALS
OXYGEN SATURATION: 95 % | DIASTOLIC BLOOD PRESSURE: 60 MMHG | WEIGHT: 217 LBS | BODY MASS INDEX: 31.07 KG/M2 | HEIGHT: 70 IN | HEART RATE: 67 BPM | SYSTOLIC BLOOD PRESSURE: 120 MMHG

## 2018-02-23 DIAGNOSIS — G56.20 LESION OF ULNAR NERVE, UNSPECIFIED UPPER LIMB: ICD-10-CM

## 2018-02-23 DIAGNOSIS — Z98.890 OTHER SPECIFIED POSTPROCEDURAL STATES: Chronic | ICD-10-CM

## 2018-02-23 DIAGNOSIS — G44.209 TENSION-TYPE HEADACHE, UNSPECIFIED, NOT INTRACTABLE: ICD-10-CM

## 2018-02-23 DIAGNOSIS — I10 ESSENTIAL (PRIMARY) HYPERTENSION: ICD-10-CM

## 2018-02-23 PROCEDURE — G0463: CPT

## 2018-02-23 PROCEDURE — 99213 OFFICE O/P EST LOW 20 MIN: CPT | Mod: GE

## 2018-02-23 RX ORDER — AZELASTINE HYDROCHLORIDE 137 UG/1
0.1 SPRAY, METERED NASAL
Qty: 30 | Refills: 0 | Status: DISCONTINUED | COMMUNITY
Start: 2017-08-31

## 2018-02-23 RX ORDER — ALBUTEROL SULFATE 90 UG/1
108 (90 BASE) AEROSOL, METERED RESPIRATORY (INHALATION)
Qty: 18 | Refills: 0 | Status: DISCONTINUED | COMMUNITY
Start: 2018-01-19

## 2018-02-23 RX ORDER — HYDROCODONE BITARTRATE AND HOMATROPINE METHYLBROMIDE 5; 1.5 MG/5ML; MG/5ML
5-1.5 SYRUP ORAL
Qty: 25 | Refills: 0 | Status: DISCONTINUED | COMMUNITY
Start: 2018-01-19

## 2018-02-23 RX ORDER — METHYLPREDNISOLONE 4 MG/1
4 TABLET ORAL
Qty: 21 | Refills: 0 | Status: DISCONTINUED | COMMUNITY
Start: 2017-08-31

## 2018-02-23 RX ORDER — SIMETHICONE 125 MG/1
125 TABLET, CHEWABLE ORAL
Qty: 90 | Refills: 0 | Status: DISCONTINUED | COMMUNITY
Start: 2017-12-07 | End: 2018-02-23

## 2018-02-23 RX ORDER — DOCUSATE SODIUM 100 MG
125 CAPSULE ORAL
Qty: 90 | Refills: 0 | Status: DISCONTINUED | COMMUNITY
Start: 2017-12-07

## 2018-02-26 ENCOUNTER — APPOINTMENT (OUTPATIENT)
Dept: SURGICAL ONCOLOGY | Facility: CLINIC | Age: 48
End: 2018-02-26
Payer: MEDICAID

## 2018-02-26 VITALS
BODY MASS INDEX: 30.49 KG/M2 | HEART RATE: 74 BPM | WEIGHT: 213 LBS | OXYGEN SATURATION: 95 % | HEIGHT: 70 IN | DIASTOLIC BLOOD PRESSURE: 85 MMHG | SYSTOLIC BLOOD PRESSURE: 126 MMHG

## 2018-02-26 PROCEDURE — 99214 OFFICE O/P EST MOD 30 MIN: CPT

## 2018-03-09 ENCOUNTER — FORM ENCOUNTER (OUTPATIENT)
Age: 48
End: 2018-03-09

## 2018-03-10 ENCOUNTER — APPOINTMENT (OUTPATIENT)
Dept: ULTRASOUND IMAGING | Facility: CLINIC | Age: 48
End: 2018-03-10
Payer: COMMERCIAL

## 2018-03-10 ENCOUNTER — OUTPATIENT (OUTPATIENT)
Dept: OUTPATIENT SERVICES | Facility: HOSPITAL | Age: 48
LOS: 1 days | End: 2018-03-10
Payer: COMMERCIAL

## 2018-03-10 DIAGNOSIS — Z98.890 OTHER SPECIFIED POSTPROCEDURAL STATES: Chronic | ICD-10-CM

## 2018-03-10 DIAGNOSIS — R22.41 LOCALIZED SWELLING, MASS AND LUMP, RIGHT LOWER LIMB: ICD-10-CM

## 2018-03-10 PROCEDURE — 76882 US LMTD JT/FCL EVL NVASC XTR: CPT | Mod: 26,RT

## 2018-03-10 PROCEDURE — 76882 US LMTD JT/FCL EVL NVASC XTR: CPT

## 2018-03-23 ENCOUNTER — FORM ENCOUNTER (OUTPATIENT)
Age: 48
End: 2018-03-23

## 2018-03-24 ENCOUNTER — APPOINTMENT (OUTPATIENT)
Dept: MRI IMAGING | Facility: CLINIC | Age: 48
End: 2018-03-24
Payer: COMMERCIAL

## 2018-03-24 ENCOUNTER — OUTPATIENT (OUTPATIENT)
Dept: OUTPATIENT SERVICES | Facility: HOSPITAL | Age: 48
LOS: 1 days | End: 2018-03-24
Payer: COMMERCIAL

## 2018-03-24 DIAGNOSIS — Z98.890 OTHER SPECIFIED POSTPROCEDURAL STATES: Chronic | ICD-10-CM

## 2018-03-24 DIAGNOSIS — Z00.8 ENCOUNTER FOR OTHER GENERAL EXAMINATION: ICD-10-CM

## 2018-03-24 PROCEDURE — A9585: CPT

## 2018-03-24 PROCEDURE — 73720 MRI LWR EXTREMITY W/O&W/DYE: CPT

## 2018-03-24 PROCEDURE — 73720 MRI LWR EXTREMITY W/O&W/DYE: CPT | Mod: 26,RT

## 2018-04-02 ENCOUNTER — FORM ENCOUNTER (OUTPATIENT)
Age: 48
End: 2018-04-02

## 2018-04-03 ENCOUNTER — APPOINTMENT (OUTPATIENT)
Dept: ULTRASOUND IMAGING | Facility: IMAGING CENTER | Age: 48
End: 2018-04-03
Payer: COMMERCIAL

## 2018-04-03 ENCOUNTER — RESULT REVIEW (OUTPATIENT)
Age: 48
End: 2018-04-03

## 2018-04-03 ENCOUNTER — OUTPATIENT (OUTPATIENT)
Dept: OUTPATIENT SERVICES | Facility: HOSPITAL | Age: 48
LOS: 1 days | End: 2018-04-03
Payer: COMMERCIAL

## 2018-04-03 DIAGNOSIS — R22.41 LOCALIZED SWELLING, MASS AND LUMP, RIGHT LOWER LIMB: ICD-10-CM

## 2018-04-03 DIAGNOSIS — Z98.890 OTHER SPECIFIED POSTPROCEDURAL STATES: Chronic | ICD-10-CM

## 2018-04-03 PROCEDURE — 88173 CYTOPATH EVAL FNA REPORT: CPT

## 2018-04-03 PROCEDURE — 88342 IMHCHEM/IMCYTCHM 1ST ANTB: CPT

## 2018-04-03 PROCEDURE — 88341 IMHCHEM/IMCYTCHM EA ADD ANTB: CPT

## 2018-04-03 PROCEDURE — 88305 TISSUE EXAM BY PATHOLOGIST: CPT

## 2018-04-03 PROCEDURE — 88305 TISSUE EXAM BY PATHOLOGIST: CPT | Mod: 26

## 2018-04-03 PROCEDURE — 88360 TUMOR IMMUNOHISTOCHEM/MANUAL: CPT | Mod: 26

## 2018-04-03 PROCEDURE — 88341 IMHCHEM/IMCYTCHM EA ADD ANTB: CPT | Mod: 26,59

## 2018-04-03 PROCEDURE — 76942 ECHO GUIDE FOR BIOPSY: CPT | Mod: 26

## 2018-04-03 PROCEDURE — 88172 CYTP DX EVAL FNA 1ST EA SITE: CPT

## 2018-04-03 PROCEDURE — 88342 IMHCHEM/IMCYTCHM 1ST ANTB: CPT | Mod: 26,59

## 2018-04-03 PROCEDURE — 20206 BIOPSY MUSCLE PERQ NEEDLE: CPT

## 2018-04-03 PROCEDURE — 88173 CYTOPATH EVAL FNA REPORT: CPT | Mod: 26

## 2018-04-03 PROCEDURE — 76942 ECHO GUIDE FOR BIOPSY: CPT

## 2018-04-03 PROCEDURE — 88360 TUMOR IMMUNOHISTOCHEM/MANUAL: CPT

## 2018-04-10 ENCOUNTER — APPOINTMENT (OUTPATIENT)
Dept: GASTROENTEROLOGY | Facility: HOSPITAL | Age: 48
End: 2018-04-10

## 2018-04-10 ENCOUNTER — OUTPATIENT (OUTPATIENT)
Dept: OUTPATIENT SERVICES | Facility: HOSPITAL | Age: 48
LOS: 1 days | End: 2018-04-10
Payer: COMMERCIAL

## 2018-04-10 VITALS
WEIGHT: 215 LBS | HEART RATE: 71 BPM | SYSTOLIC BLOOD PRESSURE: 139 MMHG | HEIGHT: 70 IN | DIASTOLIC BLOOD PRESSURE: 85 MMHG | BODY MASS INDEX: 30.78 KG/M2

## 2018-04-10 DIAGNOSIS — Z87.898 PERSONAL HISTORY OF OTHER SPECIFIED CONDITIONS: ICD-10-CM

## 2018-04-10 DIAGNOSIS — R14.0 ABDOMINAL DISTENSION (GASEOUS): ICD-10-CM

## 2018-04-10 DIAGNOSIS — K92.1 MELENA: ICD-10-CM

## 2018-04-10 DIAGNOSIS — R10.9 UNSPECIFIED ABDOMINAL PAIN: ICD-10-CM

## 2018-04-10 DIAGNOSIS — Z98.890 OTHER SPECIFIED POSTPROCEDURAL STATES: Chronic | ICD-10-CM

## 2018-04-10 DIAGNOSIS — K31.9 DISEASE OF STOMACH AND DUODENUM, UNSPECIFIED: ICD-10-CM

## 2018-04-10 DIAGNOSIS — Z87.19 PERSONAL HISTORY OF OTHER DISEASES OF THE DIGESTIVE SYSTEM: ICD-10-CM

## 2018-04-10 PROCEDURE — G0463: CPT

## 2018-04-19 LAB — NON-GYNECOLOGICAL CYTOLOGY STUDY: SIGNIFICANT CHANGE UP

## 2018-05-09 ENCOUNTER — APPOINTMENT (OUTPATIENT)
Dept: PLASTIC SURGERY | Facility: CLINIC | Age: 48
End: 2018-05-09

## 2018-05-23 PROCEDURE — 80053 COMPREHEN METABOLIC PANEL: CPT

## 2018-05-23 PROCEDURE — 96375 TX/PRO/DX INJ NEW DRUG ADDON: CPT

## 2018-05-23 PROCEDURE — 85610 PROTHROMBIN TIME: CPT

## 2018-05-23 PROCEDURE — 86850 RBC ANTIBODY SCREEN: CPT

## 2018-05-23 PROCEDURE — 83690 ASSAY OF LIPASE: CPT

## 2018-05-23 PROCEDURE — 86900 BLOOD TYPING SEROLOGIC ABO: CPT

## 2018-05-23 PROCEDURE — 81003 URINALYSIS AUTO W/O SCOPE: CPT

## 2018-05-23 PROCEDURE — 85730 THROMBOPLASTIN TIME PARTIAL: CPT

## 2018-05-23 PROCEDURE — 71046 X-RAY EXAM CHEST 2 VIEWS: CPT

## 2018-05-23 PROCEDURE — 74177 CT ABD & PELVIS W/CONTRAST: CPT

## 2018-05-23 PROCEDURE — 99285 EMERGENCY DEPT VISIT HI MDM: CPT | Mod: 25

## 2018-05-23 PROCEDURE — 85027 COMPLETE CBC AUTOMATED: CPT

## 2018-05-23 PROCEDURE — 93005 ELECTROCARDIOGRAM TRACING: CPT

## 2018-05-23 PROCEDURE — 96374 THER/PROPH/DIAG INJ IV PUSH: CPT

## 2018-05-23 PROCEDURE — 82150 ASSAY OF AMYLASE: CPT

## 2018-05-23 PROCEDURE — 86901 BLOOD TYPING SEROLOGIC RH(D): CPT

## 2018-05-23 PROCEDURE — 88302 TISSUE EXAM BY PATHOLOGIST: CPT

## 2018-06-06 ENCOUNTER — APPOINTMENT (OUTPATIENT)
Dept: PLASTIC SURGERY | Facility: CLINIC | Age: 48
End: 2018-06-06
Payer: MEDICAID

## 2018-06-06 ENCOUNTER — OUTPATIENT (OUTPATIENT)
Dept: OUTPATIENT SERVICES | Facility: HOSPITAL | Age: 48
LOS: 1 days | End: 2018-06-06
Payer: MEDICAID

## 2018-06-06 VITALS
WEIGHT: 215 LBS | HEART RATE: 72 BPM | SYSTOLIC BLOOD PRESSURE: 108 MMHG | RESPIRATION RATE: 16 BRPM | HEIGHT: 70 IN | DIASTOLIC BLOOD PRESSURE: 76 MMHG | OXYGEN SATURATION: 98 % | BODY MASS INDEX: 30.78 KG/M2

## 2018-06-06 DIAGNOSIS — Z01.818 ENCOUNTER FOR OTHER PREPROCEDURAL EXAMINATION: ICD-10-CM

## 2018-06-06 DIAGNOSIS — Z98.890 OTHER SPECIFIED POSTPROCEDURAL STATES: Chronic | ICD-10-CM

## 2018-06-06 DIAGNOSIS — R22.41 LOCALIZED SWELLING, MASS AND LUMP, RIGHT LOWER LIMB: ICD-10-CM

## 2018-06-06 PROCEDURE — 99214 OFFICE O/P EST MOD 30 MIN: CPT

## 2018-06-06 PROCEDURE — G0463: CPT

## 2018-06-07 ENCOUNTER — OUTPATIENT (OUTPATIENT)
Dept: OUTPATIENT SERVICES | Facility: HOSPITAL | Age: 48
LOS: 1 days | End: 2018-06-07
Payer: MEDICAID

## 2018-06-07 DIAGNOSIS — Z98.890 OTHER SPECIFIED POSTPROCEDURAL STATES: Chronic | ICD-10-CM

## 2018-06-07 DIAGNOSIS — K92.1 MELENA: ICD-10-CM

## 2018-06-07 PROCEDURE — 45378 DIAGNOSTIC COLONOSCOPY: CPT | Mod: GC

## 2018-06-07 PROCEDURE — 45378 DIAGNOSTIC COLONOSCOPY: CPT

## 2018-06-19 ENCOUNTER — TRANSCRIPTION ENCOUNTER (OUTPATIENT)
Age: 48
End: 2018-06-19

## 2018-06-19 DIAGNOSIS — D36.10 BENIGN NEOPLASM OF PERIPHERAL NERVES AND AUTONOMIC NERVOUS SYSTEM, UNSPECIFIED: ICD-10-CM

## 2018-06-20 ENCOUNTER — OUTPATIENT (OUTPATIENT)
Dept: OUTPATIENT SERVICES | Facility: HOSPITAL | Age: 48
LOS: 1 days | End: 2018-06-20
Payer: MEDICAID

## 2018-06-20 ENCOUNTER — APPOINTMENT (OUTPATIENT)
Dept: SURGICAL ONCOLOGY | Facility: HOSPITAL | Age: 48
End: 2018-06-20

## 2018-06-20 ENCOUNTER — RESULT REVIEW (OUTPATIENT)
Age: 48
End: 2018-06-20

## 2018-06-20 VITALS
SYSTOLIC BLOOD PRESSURE: 115 MMHG | RESPIRATION RATE: 14 BRPM | HEART RATE: 62 BPM | OXYGEN SATURATION: 97 % | WEIGHT: 208.56 LBS | DIASTOLIC BLOOD PRESSURE: 78 MMHG | HEIGHT: 69.5 IN | TEMPERATURE: 98 F

## 2018-06-20 VITALS
RESPIRATION RATE: 14 BRPM | SYSTOLIC BLOOD PRESSURE: 100 MMHG | DIASTOLIC BLOOD PRESSURE: 61 MMHG | TEMPERATURE: 98 F | OXYGEN SATURATION: 96 % | HEART RATE: 62 BPM

## 2018-06-20 DIAGNOSIS — Z98.890 OTHER SPECIFIED POSTPROCEDURAL STATES: Chronic | ICD-10-CM

## 2018-06-20 DIAGNOSIS — R22.41 LOCALIZED SWELLING, MASS AND LUMP, RIGHT LOWER LIMB: ICD-10-CM

## 2018-06-20 DIAGNOSIS — D36.10 BENIGN NEOPLASM OF PERIPHERAL NERVES AND AUTONOMIC NERVOUS SYSTEM, UNSPECIFIED: ICD-10-CM

## 2018-06-20 DIAGNOSIS — Z01.818 ENCOUNTER FOR OTHER PREPROCEDURAL EXAMINATION: ICD-10-CM

## 2018-06-20 PROCEDURE — 14020 TIS TRNFR S/A/L 10 SQ CM/<: CPT

## 2018-06-20 PROCEDURE — 88305 TISSUE EXAM BY PATHOLOGIST: CPT | Mod: 26

## 2018-06-20 PROCEDURE — 88305 TISSUE EXAM BY PATHOLOGIST: CPT

## 2018-06-20 PROCEDURE — 27618 EXC LEG/ANKLE TUM < 3 CM: CPT

## 2018-06-20 RX ORDER — ACETAMINOPHEN 500 MG
650 TABLET ORAL EVERY 6 HOURS
Qty: 0 | Refills: 0 | Status: DISCONTINUED | OUTPATIENT
Start: 2018-06-20 | End: 2018-07-05

## 2018-06-20 RX ORDER — OXYCODONE AND ACETAMINOPHEN 5; 325 MG/1; MG/1
1 TABLET ORAL EVERY 4 HOURS
Qty: 0 | Refills: 0 | Status: DISCONTINUED | OUTPATIENT
Start: 2018-06-20 | End: 2018-06-20

## 2018-06-20 RX ORDER — HYDROMORPHONE HYDROCHLORIDE 2 MG/ML
0.5 INJECTION INTRAMUSCULAR; INTRAVENOUS; SUBCUTANEOUS
Qty: 0 | Refills: 0 | Status: DISCONTINUED | OUTPATIENT
Start: 2018-06-20 | End: 2018-06-20

## 2018-06-20 RX ORDER — HEPARIN SODIUM 5000 [USP'U]/ML
5000 INJECTION INTRAVENOUS; SUBCUTANEOUS ONCE
Qty: 0 | Refills: 0 | Status: COMPLETED | OUTPATIENT
Start: 2018-06-20 | End: 2018-06-20

## 2018-06-20 RX ORDER — MEPERIDINE HYDROCHLORIDE 50 MG/ML
12.5 INJECTION INTRAMUSCULAR; INTRAVENOUS; SUBCUTANEOUS
Qty: 0 | Refills: 0 | Status: DISCONTINUED | OUTPATIENT
Start: 2018-06-20 | End: 2018-06-20

## 2018-06-20 RX ORDER — OXYCODONE HYDROCHLORIDE 5 MG/1
10 TABLET ORAL EVERY 4 HOURS
Qty: 0 | Refills: 0 | Status: DISCONTINUED | OUTPATIENT
Start: 2018-06-20 | End: 2018-06-20

## 2018-06-20 RX ORDER — OXYCODONE HYDROCHLORIDE 5 MG/1
5 TABLET ORAL EVERY 6 HOURS
Qty: 0 | Refills: 0 | Status: DISCONTINUED | OUTPATIENT
Start: 2018-06-20 | End: 2018-06-20

## 2018-06-20 RX ORDER — ONDANSETRON 8 MG/1
4 TABLET, FILM COATED ORAL ONCE
Qty: 0 | Refills: 0 | Status: DISCONTINUED | OUTPATIENT
Start: 2018-06-20 | End: 2018-06-20

## 2018-06-20 RX ORDER — SODIUM CHLORIDE 9 MG/ML
1000 INJECTION, SOLUTION INTRAVENOUS
Qty: 0 | Refills: 0 | Status: DISCONTINUED | OUTPATIENT
Start: 2018-06-20 | End: 2018-06-20

## 2018-06-20 RX ADMIN — OXYCODONE AND ACETAMINOPHEN 1 TABLET(S): 5; 325 TABLET ORAL at 10:23

## 2018-06-20 RX ADMIN — HEPARIN SODIUM 5000 UNIT(S): 5000 INJECTION INTRAVENOUS; SUBCUTANEOUS at 07:31

## 2018-06-20 NOTE — ASU DISCHARGE PLAN (ADULT/PEDIATRIC). - FOLLOWUP APPOINTMENT CLINIC/PHYSICIAN
Please make an appointment to see Dr. Nguyen NEXT WEDNESDAY (06/27/18). Please call the Plastic Surgery Office to make an appointment.

## 2018-06-20 NOTE — BRIEF OPERATIVE NOTE - PROCEDURE
<<-----Click on this checkbox to enter Procedure Adjacent tissue transfer  06/20/2018    Active  BKWON

## 2018-06-20 NOTE — ASU PATIENT PROFILE, ADULT - PSH
H/O excision of mass  left thigh benign  History of drainage of abscess  2012 anorectal  S/P colonoscopy  2012  S/P hernia repair

## 2018-06-20 NOTE — ASU DISCHARGE PLAN (ADULT/PEDIATRIC). - INSTRUCTIONS
Please keep all dressings intact. They are waterproof dressings, you may take QUICK showers starting tomorrow - please pat down the dressings dry after.    Keep the right leg elevated when you sleep; use 1-2 pillows. It will help with swelling.    No strenuous activities such as bending, squatting, stretching for the next 3-4 weeks.    Take pain medications as directed.

## 2018-06-20 NOTE — BRIEF OPERATIVE NOTE - POST-OP DX
Mass  06/20/2018  lower inner right leg  Active  Rafa Sal  Mass of right lower leg  06/20/2018    Active  Adam Hayden
Mass of right lower leg  06/20/2018    Active  Adam Hayden

## 2018-06-20 NOTE — BRIEF OPERATIVE NOTE - OPERATION/FINDINGS
reconstruction of right lower leg s/p excision of right lower leg mass reconstruction of right lower leg s/p excision of right lower leg mass  3-0 monocryl and 4-0 nylon were used to close  xeroform, 4x4, and tegederm placed as dressings.

## 2018-06-20 NOTE — ASU DISCHARGE PLAN (ADULT/PEDIATRIC). - NOTIFY
Pain not relieved by Medications/Fever greater than 101/Bleeding that does not stop/Swelling that continues/Numbness, color, or temperature change to extremity

## 2018-06-20 NOTE — ASU DISCHARGE PLAN (ADULT/PEDIATRIC). - NURSING INSTRUCTIONS
patient provided with all discharge information as per MD instructions. Reinforced all safety issues and importance of following up with MD. Verbalizes understanding of all information provided and is prepared for discharge.

## 2018-06-20 NOTE — ASU DISCHARGE PLAN (ADULT/PEDIATRIC). - ACTIVITY LEVEL
elevate extremity/SLEEP WITH RIGHT LEG ELEVATED ON PILLOWS/no exercise/no sports/gym/no heavy lifting

## 2018-06-20 NOTE — ASU DISCHARGE PLAN (ADULT/PEDIATRIC). - MEDICATION SUMMARY - MEDICATIONS TO TAKE
I will START or STAY ON the medications listed below when I get home from the hospital:    Tylenol 325 mg oral tablet  -- 325 milligram(s) by mouth prn, As Needed  -- Indication: For for mild-moderate pain    Percocet 5/325 oral tablet  -- 1 tab(s) by mouth every 4 to 6 hours, As Needed -for severe pain MDD:6 tabs   -- Caution federal law prohibits the transfer of this drug to any person other  than the person for whom it was prescribed.  May cause drowsiness.  Alcohol may intensify this effect.  Use care when operating dangerous machinery.  This prescription cannot be refilled.  This product contains acetaminophen.  Do not use  with any other product containing acetaminophen to prevent possible liver damage.  Using more of this medication than prescribed may cause serious breathing problems.    -- Indication: For for severe pain, do not take at the same time with other Tylenol     Proventil HFA 90 mcg/inh inhalation aerosol  -- 2 puff(s) inhaled every 4 hours as needed for cough  -- For inhalation only.  It is very important that you take or use this exactly as directed.  Do not skip doses or discontinue unless directed by your doctor.  Obtain medical advice before taking any non-prescription drugs as some may affect the action of this medication.  Shake well before use.    -- Indication: For Home med    Pepcid 20 mg oral tablet  -- 20 milligram(s) by mouth twice  -- Indication: For Home med    Flonase 50 mcg/inh nasal spray  -- 1 spray(s) intranasally once a day   -- For the nose.  It is very important that you take or use this exactly as directed.  Do not skip doses or discontinue unless directed by your doctor.    -- Indication: For Home med    HYDROcodone-homatropine 5 mg-1.5 mg/5 mL oral syrup  -- 5 milliliter(s) by mouth once a day (at bedtime) as needed for cough MDD:5mL  -- Caution federal law prohibits the transfer of this drug to any person other  than the person for whom it was prescribed.  May cause drowsiness.  Alcohol may intensify this effect.  Use care when operating dangerous machinery.  Obtain medical advice before taking any non-prescription drugs as some may affect the action of this medication.    -- Indication: For Home med

## 2018-06-20 NOTE — BRIEF OPERATIVE NOTE - PROCEDURE
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

## 2018-06-25 LAB — SURGICAL PATHOLOGY STUDY: SIGNIFICANT CHANGE UP

## 2018-06-27 ENCOUNTER — APPOINTMENT (OUTPATIENT)
Dept: PLASTIC SURGERY | Facility: CLINIC | Age: 48
End: 2018-06-27
Payer: MEDICAID

## 2018-06-27 VITALS
WEIGHT: 215 LBS | BODY MASS INDEX: 30.78 KG/M2 | OXYGEN SATURATION: 98 % | DIASTOLIC BLOOD PRESSURE: 84 MMHG | HEIGHT: 70 IN | RESPIRATION RATE: 16 BRPM | HEART RATE: 62 BPM | SYSTOLIC BLOOD PRESSURE: 122 MMHG

## 2018-06-27 DIAGNOSIS — R22.41 LOCALIZED SWELLING, MASS AND LUMP, RIGHT LOWER LIMB: ICD-10-CM

## 2018-06-27 PROCEDURE — 99024 POSTOP FOLLOW-UP VISIT: CPT

## 2018-06-28 PROBLEM — R22.41 MASS OF RIGHT LOWER LEG: Status: RESOLVED | Noted: 2018-02-26 | Resolved: 2018-06-28

## 2018-07-05 ENCOUNTER — APPOINTMENT (OUTPATIENT)
Dept: PLASTIC SURGERY | Facility: CLINIC | Age: 48
End: 2018-07-05
Payer: MEDICAID

## 2018-07-05 VITALS
RESPIRATION RATE: 15 BRPM | BODY MASS INDEX: 30.78 KG/M2 | HEART RATE: 67 BPM | WEIGHT: 215 LBS | DIASTOLIC BLOOD PRESSURE: 81 MMHG | HEIGHT: 70 IN | SYSTOLIC BLOOD PRESSURE: 120 MMHG

## 2018-07-05 PROCEDURE — 99024 POSTOP FOLLOW-UP VISIT: CPT

## 2018-07-19 ENCOUNTER — APPOINTMENT (OUTPATIENT)
Dept: PLASTIC SURGERY | Facility: CLINIC | Age: 48
End: 2018-07-19
Payer: MEDICAID

## 2018-07-19 VITALS
SYSTOLIC BLOOD PRESSURE: 122 MMHG | BODY MASS INDEX: 29.49 KG/M2 | HEIGHT: 70 IN | RESPIRATION RATE: 14 BRPM | DIASTOLIC BLOOD PRESSURE: 80 MMHG | HEART RATE: 76 BPM | WEIGHT: 206 LBS

## 2018-07-19 PROCEDURE — 99024 POSTOP FOLLOW-UP VISIT: CPT

## 2018-08-14 ENCOUNTER — OUTPATIENT (OUTPATIENT)
Dept: OUTPATIENT SERVICES | Facility: HOSPITAL | Age: 48
LOS: 1 days | End: 2018-08-14
Payer: MEDICAID

## 2018-08-14 VITALS
TEMPERATURE: 98 F | WEIGHT: 203.71 LBS | DIASTOLIC BLOOD PRESSURE: 76 MMHG | HEART RATE: 66 BPM | RESPIRATION RATE: 13 BRPM | HEIGHT: 69 IN | OXYGEN SATURATION: 97 % | SYSTOLIC BLOOD PRESSURE: 119 MMHG

## 2018-08-14 DIAGNOSIS — Z98.890 OTHER SPECIFIED POSTPROCEDURAL STATES: Chronic | ICD-10-CM

## 2018-08-14 DIAGNOSIS — R92.0 MAMMOGRAPHIC MICROCALCIFICATION FOUND ON DIAGNOSTIC IMAGING OF BREAST: ICD-10-CM

## 2018-08-14 DIAGNOSIS — D23.70 OTHER BENIGN NEOPLASM OF SKIN OF UNSPECIFIED LOWER LIMB, INCLUDING HIP: ICD-10-CM

## 2018-08-14 DIAGNOSIS — Z01.818 ENCOUNTER FOR OTHER PREPROCEDURAL EXAMINATION: ICD-10-CM

## 2018-08-14 LAB
HCT VFR BLD CALC: 48.4 % — SIGNIFICANT CHANGE UP (ref 39–50)
HGB BLD-MCNC: 16.1 G/DL — SIGNIFICANT CHANGE UP (ref 13–17)
MCHC RBC-ENTMCNC: 30 PG — SIGNIFICANT CHANGE UP (ref 27–34)
MCHC RBC-ENTMCNC: 33.2 GM/DL — SIGNIFICANT CHANGE UP (ref 32–36)
MCV RBC AUTO: 90.5 FL — SIGNIFICANT CHANGE UP (ref 80–100)
PLATELET # BLD AUTO: 309 K/UL — SIGNIFICANT CHANGE UP (ref 150–400)
RBC # BLD: 5.35 M/UL — SIGNIFICANT CHANGE UP (ref 4.2–5.8)
RBC # FLD: 11.5 % — SIGNIFICANT CHANGE UP (ref 10.3–14.5)
WBC # BLD: 5.7 K/UL — SIGNIFICANT CHANGE UP (ref 3.8–10.5)
WBC # FLD AUTO: 5.7 K/UL — SIGNIFICANT CHANGE UP (ref 3.8–10.5)

## 2018-08-14 PROCEDURE — G0463: CPT

## 2018-08-14 PROCEDURE — 85027 COMPLETE CBC AUTOMATED: CPT

## 2018-08-14 RX ORDER — FAMOTIDINE 10 MG/ML
20 INJECTION INTRAVENOUS
Qty: 0 | Refills: 0 | COMMUNITY

## 2018-08-14 NOTE — H&P PST ADULT - PROBLEM SELECTOR PLAN 1
Scheduled for wide excision left leg tumor on 8/22/18.    Pre-op lab obtained. Obtain medical clearance. Scheduled for wide excision right leg tumor on 8/22/18.    Pre-op lab obtained. Obtain medical clearance.

## 2018-08-14 NOTE — H&P PST ADULT - PMH
Anorectal abscess    History of Clostridium difficile colitis  2012  Mass of left thigh  neuroma excised posterior thigh  Other benign neoplasm of skin of unspecified lower limb, including hip

## 2018-08-14 NOTE — H&P PST ADULT - PSH
H/O excision of mass  left thigh benign  History of drainage of abscess  2012 anorectal  History of excision of mass  right leg 6/2018  S/P colonoscopy  2012  S/P hernia repair

## 2018-08-14 NOTE — H&P PST ADULT - ATTENDING COMMENTS
47-year-old man with margin involvement and recent excision of dermatofibroma from right leg schedule for a wider excision with reconstruction by Dr. Nguyen.    Diagnosis and management have been prior to surgery the morning of operation.    All questions answered.    Consent on chart

## 2018-08-14 NOTE — H&P PST ADULT - HISTORY OF PRESENT ILLNESS
48 y/o male presents to PST.  He states a tumor was removed from his lower right leg about 2 months ago and his doctor wants to remove additional tumor from the same site. Scheduled for wide leg excision left leg tumor on 8/22/18.  Per pt, the surgery will be on his right lower leg.  Phone call placed to Dr Sal's office for clarification.  Surgery site to be confirmed. Message left. 46 y/o male presents to PST.  He states a tumor was removed from his lower right leg about 2 months ago and his doctor wants to remove additional tumor from the same site. Scheduled for wide leg excision right leg tumor on 8/22/18.  Per pt, the surgery will be on his right lower leg.  Phone call placed to Dr Sal's office for clarification.

## 2018-08-16 ENCOUNTER — APPOINTMENT (OUTPATIENT)
Dept: INTERNAL MEDICINE | Facility: CLINIC | Age: 48
End: 2018-08-16

## 2018-08-17 PROBLEM — D23.70 OTHER BENIGN NEOPLASM OF SKIN OF UNSPECIFIED LOWER LIMB, INCLUDING HIP: Chronic | Status: ACTIVE | Noted: 2018-08-14

## 2018-08-20 ENCOUNTER — OUTPATIENT (OUTPATIENT)
Dept: OUTPATIENT SERVICES | Facility: HOSPITAL | Age: 48
LOS: 1 days | End: 2018-08-20
Payer: MEDICAID

## 2018-08-20 ENCOUNTER — APPOINTMENT (OUTPATIENT)
Dept: INTERNAL MEDICINE | Facility: CLINIC | Age: 48
End: 2018-08-20
Payer: MEDICAID

## 2018-08-20 ENCOUNTER — NON-APPOINTMENT (OUTPATIENT)
Age: 48
End: 2018-08-20

## 2018-08-20 VITALS
SYSTOLIC BLOOD PRESSURE: 130 MMHG | DIASTOLIC BLOOD PRESSURE: 70 MMHG | HEIGHT: 70 IN | WEIGHT: 206 LBS | BODY MASS INDEX: 29.49 KG/M2

## 2018-08-20 DIAGNOSIS — Z86.018 PERSONAL HISTORY OF OTHER BENIGN NEOPLASM: ICD-10-CM

## 2018-08-20 DIAGNOSIS — Z98.890 OTHER SPECIFIED POSTPROCEDURAL STATES: Chronic | ICD-10-CM

## 2018-08-20 DIAGNOSIS — D03.71 MELANOMA IN SITU OF RIGHT LOWER LIMB, INCLUDING HIP: ICD-10-CM

## 2018-08-20 DIAGNOSIS — M72.2 PLANTAR FASCIAL FIBROMATOSIS: ICD-10-CM

## 2018-08-20 DIAGNOSIS — Z01.818 ENCOUNTER FOR OTHER PREPROCEDURAL EXAMINATION: ICD-10-CM

## 2018-08-20 DIAGNOSIS — G62.9 POLYNEUROPATHY, UNSPECIFIED: ICD-10-CM

## 2018-08-20 DIAGNOSIS — G56.20 LESION OF ULNAR NERVE, UNSPECIFIED UPPER LIMB: ICD-10-CM

## 2018-08-20 DIAGNOSIS — R22.42 LOCALIZED SWELLING, MASS AND LUMP, LEFT LOWER LIMB: ICD-10-CM

## 2018-08-20 PROCEDURE — 93005 ELECTROCARDIOGRAM TRACING: CPT

## 2018-08-20 PROCEDURE — 99213 OFFICE O/P EST LOW 20 MIN: CPT | Mod: GE

## 2018-08-20 PROCEDURE — G0463: CPT

## 2018-08-20 RX ORDER — POLYETHYLENE GLYCOL 3350 AND ELECTROLYTES WITH LEMON FLAVOR 236; 22.74; 6.74; 5.86; 2.97 G/4L; G/4L; G/4L; G/4L; G/4L
236 POWDER, FOR SOLUTION ORAL
Qty: 4000 | Refills: 0 | Status: DISCONTINUED | COMMUNITY
Start: 2018-04-10 | End: 2018-08-20

## 2018-08-20 NOTE — PHYSICAL EXAM
[No Acute Distress] : no acute distress [Well Nourished] : well nourished [Well Developed] : well developed [Well-Appearing] : well-appearing [Normal Sclera/Conjunctiva] : normal sclera/conjunctiva [EOMI] : extraocular movements intact [Normal Outer Ear/Nose] : the outer ears and nose were normal in appearance [Normal Oropharynx] : the oropharynx was normal [No JVD] : no jugular venous distention [Supple] : supple [No Respiratory Distress] : no respiratory distress  [Clear to Auscultation] : lungs were clear to auscultation bilaterally [No Accessory Muscle Use] : no accessory muscle use [Normal Rate] : normal rate  [Regular Rhythm] : with a regular rhythm [Normal S1, S2] : normal S1 and S2 [No Murmur] : no murmur heard [No Carotid Bruits] : no carotid bruits [Pedal Pulses Present] : the pedal pulses are present [No Edema] : there was no peripheral edema [No Extremity Clubbing/Cyanosis] : no extremity clubbing/cyanosis [Soft] : abdomen soft [Non Tender] : non-tender [Non-distended] : non-distended [No Masses] : no abdominal mass palpated [No HSM] : no HSM [Normal Bowel Sounds] : normal bowel sounds [Normal Posterior Cervical Nodes] : no posterior cervical lymphadenopathy [Normal Anterior Cervical Nodes] : no anterior cervical lymphadenopathy [No CVA Tenderness] : no CVA  tenderness [No Spinal Tenderness] : no spinal tenderness [No Joint Swelling] : no joint swelling [Grossly Normal Strength/Tone] : grossly normal strength/tone [No Rash] : no rash [Normal Gait] : normal gait [Coordination Grossly Intact] : coordination grossly intact [No Focal Deficits] : no focal deficits [Deep Tendon Reflexes (DTR)] : deep tendon reflexes were 2+ and symmetric [Normal Affect] : the affect was normal [Normal Insight/Judgement] : insight and judgment were intact

## 2018-08-21 DIAGNOSIS — I10 ESSENTIAL (PRIMARY) HYPERTENSION: ICD-10-CM

## 2018-08-22 ENCOUNTER — APPOINTMENT (OUTPATIENT)
Dept: SURGICAL ONCOLOGY | Facility: HOSPITAL | Age: 48
End: 2018-08-22

## 2018-08-22 ENCOUNTER — OUTPATIENT (OUTPATIENT)
Dept: OUTPATIENT SERVICES | Facility: HOSPITAL | Age: 48
LOS: 1 days | End: 2018-08-22
Payer: MEDICAID

## 2018-08-22 ENCOUNTER — RX RENEWAL (OUTPATIENT)
Age: 48
End: 2018-08-22

## 2018-08-22 ENCOUNTER — RESULT REVIEW (OUTPATIENT)
Age: 48
End: 2018-08-22

## 2018-08-22 VITALS
HEIGHT: 69 IN | RESPIRATION RATE: 16 BRPM | SYSTOLIC BLOOD PRESSURE: 116 MMHG | WEIGHT: 203.71 LBS | TEMPERATURE: 98 F | HEART RATE: 72 BPM | OXYGEN SATURATION: 96 % | DIASTOLIC BLOOD PRESSURE: 73 MMHG

## 2018-08-22 VITALS
RESPIRATION RATE: 16 BRPM | OXYGEN SATURATION: 97 % | SYSTOLIC BLOOD PRESSURE: 125 MMHG | DIASTOLIC BLOOD PRESSURE: 78 MMHG | TEMPERATURE: 99 F | HEART RATE: 76 BPM

## 2018-08-22 DIAGNOSIS — D23.70 OTHER BENIGN NEOPLASM OF SKIN OF UNSPECIFIED LOWER LIMB, INCLUDING HIP: ICD-10-CM

## 2018-08-22 DIAGNOSIS — Z98.890 OTHER SPECIFIED POSTPROCEDURAL STATES: Chronic | ICD-10-CM

## 2018-08-22 PROCEDURE — 88305 TISSUE EXAM BY PATHOLOGIST: CPT | Mod: 26

## 2018-08-22 PROCEDURE — 27618 EXC LEG/ANKLE TUM < 3 CM: CPT | Mod: 58

## 2018-08-22 PROCEDURE — 88305 TISSUE EXAM BY PATHOLOGIST: CPT

## 2018-08-22 PROCEDURE — 14020 TIS TRNFR S/A/L 10 SQ CM/<: CPT

## 2018-08-22 PROCEDURE — 14020 TIS TRNFR S/A/L 10 SQ CM/<: CPT | Mod: 58

## 2018-08-22 RX ORDER — HYDROMORPHONE HYDROCHLORIDE 2 MG/ML
1 INJECTION INTRAMUSCULAR; INTRAVENOUS; SUBCUTANEOUS
Qty: 0 | Refills: 0 | Status: DISCONTINUED | OUTPATIENT
Start: 2018-08-22 | End: 2018-08-22

## 2018-08-22 RX ORDER — ACETAMINOPHEN 500 MG
1000 TABLET ORAL ONCE
Qty: 0 | Refills: 0 | Status: COMPLETED | OUTPATIENT
Start: 2018-08-22 | End: 2018-08-22

## 2018-08-22 RX ORDER — HEPARIN SODIUM 5000 [USP'U]/ML
5000 INJECTION INTRAVENOUS; SUBCUTANEOUS ONCE
Qty: 0 | Refills: 0 | Status: COMPLETED | OUTPATIENT
Start: 2018-08-22 | End: 2018-08-22

## 2018-08-22 RX ORDER — SODIUM CHLORIDE 9 MG/ML
1000 INJECTION, SOLUTION INTRAVENOUS
Qty: 0 | Refills: 0 | Status: DISCONTINUED | OUTPATIENT
Start: 2018-08-22 | End: 2018-08-22

## 2018-08-22 RX ORDER — ONDANSETRON 8 MG/1
4 TABLET, FILM COATED ORAL ONCE
Qty: 0 | Refills: 0 | Status: DISCONTINUED | OUTPATIENT
Start: 2018-08-22 | End: 2018-08-22

## 2018-08-22 RX ORDER — IBUPROFEN 200 MG
600 TABLET ORAL EVERY 6 HOURS
Qty: 0 | Refills: 0 | Status: DISCONTINUED | OUTPATIENT
Start: 2018-08-22 | End: 2018-09-06

## 2018-08-22 RX ORDER — HYDROMORPHONE HYDROCHLORIDE 2 MG/ML
0.5 INJECTION INTRAMUSCULAR; INTRAVENOUS; SUBCUTANEOUS
Qty: 0 | Refills: 0 | Status: DISCONTINUED | OUTPATIENT
Start: 2018-08-22 | End: 2018-08-22

## 2018-08-22 RX ORDER — OXYCODONE AND ACETAMINOPHEN 5; 325 MG/1; MG/1
2 TABLET ORAL ONCE
Qty: 0 | Refills: 0 | Status: DISCONTINUED | OUTPATIENT
Start: 2018-08-22 | End: 2018-08-22

## 2018-08-22 RX ADMIN — HYDROMORPHONE HYDROCHLORIDE 1 MILLIGRAM(S): 2 INJECTION INTRAMUSCULAR; INTRAVENOUS; SUBCUTANEOUS at 15:30

## 2018-08-22 RX ADMIN — HEPARIN SODIUM 5000 UNIT(S): 5000 INJECTION INTRAVENOUS; SUBCUTANEOUS at 13:23

## 2018-08-22 RX ADMIN — HYDROMORPHONE HYDROCHLORIDE 1 MILLIGRAM(S): 2 INJECTION INTRAMUSCULAR; INTRAVENOUS; SUBCUTANEOUS at 16:00

## 2018-08-22 RX ADMIN — HYDROMORPHONE HYDROCHLORIDE 1 MILLIGRAM(S): 2 INJECTION INTRAMUSCULAR; INTRAVENOUS; SUBCUTANEOUS at 15:45

## 2018-08-22 RX ADMIN — HYDROMORPHONE HYDROCHLORIDE 1 MILLIGRAM(S): 2 INJECTION INTRAMUSCULAR; INTRAVENOUS; SUBCUTANEOUS at 15:48

## 2018-08-22 RX ADMIN — SODIUM CHLORIDE 75 MILLILITER(S): 9 INJECTION, SOLUTION INTRAVENOUS at 15:33

## 2018-08-22 RX ADMIN — SODIUM CHLORIDE 50 MILLILITER(S): 9 INJECTION, SOLUTION INTRAVENOUS at 11:05

## 2018-08-22 RX ADMIN — HYDROMORPHONE HYDROCHLORIDE 1 MILLIGRAM(S): 2 INJECTION INTRAMUSCULAR; INTRAVENOUS; SUBCUTANEOUS at 16:25

## 2018-08-22 RX ADMIN — Medication 1000 MILLIGRAM(S): at 16:45

## 2018-08-22 RX ADMIN — HYDROMORPHONE HYDROCHLORIDE 1 MILLIGRAM(S): 2 INJECTION INTRAMUSCULAR; INTRAVENOUS; SUBCUTANEOUS at 16:10

## 2018-08-22 RX ADMIN — Medication 400 MILLIGRAM(S): at 16:30

## 2018-08-22 NOTE — BRIEF OPERATIVE NOTE - PROCEDURE
<<-----Click on this checkbox to enter Procedure Adjacent tissue transfer  08/22/2018    Active  VMOON

## 2018-08-22 NOTE — ASU DISCHARGE PLAN (ADULT/PEDIATRIC). - ITEMS TO FOLLOWUP WITH YOUR PHYSICIAN'S
Initial followup is with plastic surgery in the next week or 2.    Dr. Sal should call with pathology report by August 31, and further management will be based upon that information Please call to make an appointment to see Dr. Nguyen next Thursday.  Call the office: [phone].    Dr. Sal should call with pathology report by August 31, and further management will be based upon that information

## 2018-08-22 NOTE — ASU DISCHARGE PLAN (ADULT/PEDIATRIC). - NOTIFY
Excessive Diarrhea/Inability to Tolerate Liquids or Foods/Unable to Urinate/Pain not relieved by Medications/Fever greater than 101/Bleeding that does not stop/Numbness, tingling/Swelling that continues/Increased Irritability or Sluggishness/Persistent Nausea and Vomiting/Numbness, color, or temperature change to extremity

## 2018-08-22 NOTE — BRIEF OPERATIVE NOTE - OPERATION/FINDINGS
Clinically negative margins
advancement flap reconstruction of the right inner ankle (lower leg) wound  approx. 5 cm in length, 1cm wide  closed with 3-0 vicryl, 4-0 mono, 4-0 nylon, xeroform, 4x4, tegederm, ace wrap

## 2018-08-22 NOTE — ASU DISCHARGE PLAN (ADULT/PEDIATRIC). - SPECIAL INSTRUCTIONS
You may remove the ACE wrap before showering; put it back on (from foot up to the leg) afterwards.  Sleep with the right foot elevated, keep the foot elevated as much as possible to reduce swelling.  Keep all the plastic bandages intact until you see dr. Nguyen.  percocets were sent to the pharmacy; please take as directed.  Don't take tylenol if you are taking percocet the same day; take advil instead.

## 2018-08-22 NOTE — ASU DISCHARGE PLAN (ADULT/PEDIATRIC). - MEDICATION SUMMARY - MEDICATIONS TO TAKE
I will START or STAY ON the medications listed below when I get home from the hospital:    Tylenol 325 mg oral tablet  -- 325 milligram(s) by mouth prn, As Needed  -- Indication: For for mild-moderate pain; don't take if you're taking the percocet    Multiple Vitamins oral capsule  -- 1 cap(s) by mouth once a day  -- Indication: For home medication

## 2018-08-22 NOTE — BRIEF OPERATIVE NOTE - PROCEDURE
<<-----Click on this checkbox to enter Procedure Excision  08/22/2018  Tumor bed from prior resection of dermatofibroma, with reconstruction by Dr. Wendy HARTMAN

## 2018-08-27 LAB — SURGICAL PATHOLOGY STUDY: SIGNIFICANT CHANGE UP

## 2018-08-29 ENCOUNTER — APPOINTMENT (OUTPATIENT)
Dept: PLASTIC SURGERY | Facility: CLINIC | Age: 48
End: 2018-08-29
Payer: MEDICAID

## 2018-08-29 VITALS
WEIGHT: 204 LBS | DIASTOLIC BLOOD PRESSURE: 82 MMHG | SYSTOLIC BLOOD PRESSURE: 134 MMHG | OXYGEN SATURATION: 97 % | HEART RATE: 75 BPM | BODY MASS INDEX: 29.2 KG/M2 | RESPIRATION RATE: 16 BRPM | HEIGHT: 70 IN

## 2018-08-29 PROCEDURE — 99024 POSTOP FOLLOW-UP VISIT: CPT

## 2018-09-01 ENCOUNTER — FORM ENCOUNTER (OUTPATIENT)
Age: 48
End: 2018-09-01

## 2018-09-02 ENCOUNTER — APPOINTMENT (OUTPATIENT)
Dept: MRI IMAGING | Facility: CLINIC | Age: 48
End: 2018-09-02
Payer: MEDICAID

## 2018-09-02 ENCOUNTER — OUTPATIENT (OUTPATIENT)
Dept: OUTPATIENT SERVICES | Facility: HOSPITAL | Age: 48
LOS: 1 days | End: 2018-09-02
Payer: MEDICAID

## 2018-09-02 DIAGNOSIS — Z98.890 OTHER SPECIFIED POSTPROCEDURAL STATES: Chronic | ICD-10-CM

## 2018-09-02 DIAGNOSIS — D36.10 BENIGN NEOPLASM OF PERIPHERAL NERVES AND AUTONOMIC NERVOUS SYSTEM, UNSPECIFIED: ICD-10-CM

## 2018-09-02 PROCEDURE — 73722 MRI JOINT OF LWR EXTR W/DYE: CPT | Mod: 26,LT

## 2018-09-02 PROCEDURE — 73722 MRI JOINT OF LWR EXTR W/DYE: CPT

## 2018-09-02 PROCEDURE — A9585: CPT

## 2018-09-03 ENCOUNTER — EMERGENCY (EMERGENCY)
Facility: HOSPITAL | Age: 48
LOS: 1 days | Discharge: HOME | End: 2018-09-03
Attending: EMERGENCY MEDICINE | Admitting: EMERGENCY MEDICINE
Payer: MEDICAID

## 2018-09-03 VITALS
DIASTOLIC BLOOD PRESSURE: 72 MMHG | OXYGEN SATURATION: 98 % | SYSTOLIC BLOOD PRESSURE: 112 MMHG | WEIGHT: 209.66 LBS | HEART RATE: 82 BPM | TEMPERATURE: 99 F | RESPIRATION RATE: 17 BRPM

## 2018-09-03 DIAGNOSIS — M79.661 PAIN IN RIGHT LOWER LEG: ICD-10-CM

## 2018-09-03 DIAGNOSIS — Z98.890 OTHER SPECIFIED POSTPROCEDURAL STATES: Chronic | ICD-10-CM

## 2018-09-03 PROCEDURE — 99283 EMERGENCY DEPT VISIT LOW MDM: CPT

## 2018-09-03 RX ORDER — IBUPROFEN 200 MG
1 TABLET ORAL
Qty: 20 | Refills: 0 | OUTPATIENT
Start: 2018-09-03 | End: 2018-09-07

## 2018-09-03 RX ORDER — CEPHALEXIN 500 MG
1 CAPSULE ORAL
Qty: 30 | Refills: 0
Start: 2018-09-03 | End: 2018-09-12

## 2018-09-03 RX ORDER — IBUPROFEN 200 MG
600 TABLET ORAL ONCE
Qty: 0 | Refills: 0 | Status: COMPLETED | OUTPATIENT
Start: 2018-09-03 | End: 2018-09-03

## 2018-09-03 RX ADMIN — Medication 1 TABLET(S): at 20:25

## 2018-09-03 NOTE — ED ADULT TRIAGE NOTE - CHIEF COMPLAINT QUOTE
I had surgery on August 22nd to remove a tumor on my right leg. It started hurting bad yesterday and got worse today.

## 2018-09-03 NOTE — ED PROVIDER NOTE - OBJECTIVE STATEMENT
..start  46 y/o male with h/o tumor removal from right lower leg presents to ed c/o redness and pus discharged from sutures. Pt had small benign tumor removed from lower leg week ago, now has redness and small purulent discharge.  No fever

## 2018-09-03 NOTE — ED ADULT NURSE NOTE - NSIMPLEMENTINTERV_GEN_ALL_ED
Implemented All Universal Safety Interventions:  [place] to call system. Call bell, personal items and telephone within reach. Instruct patient to call for assistance. Room bathroom lighting operational. Non-slip footwear when patient is off stretcher. Physically safe environment: no spills, clutter or unnecessary equipment. Stretcher in lowest position, wheels locked, appropriate side rails in place.

## 2018-09-03 NOTE — ED PROVIDER NOTE - MEDICAL DECISION MAKING DETAILS
pt has infected wound with intact sutured , removed sutures of infected area, oral abx. and local wound care

## 2018-09-05 ENCOUNTER — APPOINTMENT (OUTPATIENT)
Dept: PLASTIC SURGERY | Facility: CLINIC | Age: 48
End: 2018-09-05
Payer: MEDICAID

## 2018-09-05 VITALS
SYSTOLIC BLOOD PRESSURE: 122 MMHG | DIASTOLIC BLOOD PRESSURE: 74 MMHG | OXYGEN SATURATION: 96 % | BODY MASS INDEX: 29.2 KG/M2 | HEART RATE: 70 BPM | WEIGHT: 204 LBS | RESPIRATION RATE: 16 BRPM | HEIGHT: 70 IN

## 2018-09-05 PROCEDURE — 99024 POSTOP FOLLOW-UP VISIT: CPT

## 2018-09-10 NOTE — PLAN
[FreeTextEntry1] : Pt here for medical clearance\par - RCI score of 0, which indicates risk of 0.4% of adverse cardiovascular events during surgery\par - Pt is medically optimized for surgery\par - No objections from medicine standpoint.

## 2018-09-10 NOTE — HISTORY OF PRESENT ILLNESS
[(Patient denies any chest pain, claudication, dyspnea on exertion, orthopnea, palpitations or syncope)] : Patient denies any chest pain, claudication, dyspnea on exertion, orthopnea, palpitations or syncope [Excellent (>10 METs)] : Excellent (>10 METs) [No Pertinent Cardiac History] : no history of aortic stenosis, atrial fibrillation, coronary artery disease, recent myocardial infarction, or implantable device/pacemaker [No Pertinent Pulmonary History] : no history of asthma, COPD, sleep apnea, or smoking [No Adverse Anesthesia Reaction] : no adverse anesthesia reaction in self or family member [Chronic Anticoagulation] : no chronic anticoagulation [Chronic Kidney Disease] : no chronic kidney disease [Diabetes] : no diabetes [FreeTextEntry1] : Tumor resection (re-do) [FreeTextEntry2] : 08/22/18 [FreeTextEntry3] : Beg [FreeTextEntry4] : Pt is here for pre-op evaluation for a RIGHT Lower Extremity wide exicision. [FreeTextEntry7] : EKG: sinus rhythm with normal intervals

## 2018-09-10 NOTE — ASSESSMENT
[Patient Optimized for Surgery] : Patient optimized for surgery [No Further Testing Recommended] : no further testing recommended [As per surgery] : as per surgery [FreeTextEntry4] : RCI is 0 with a 0.4% chance of major cardiovascular events during procedure. Pt is medically optimized for surgery [FreeTextEntry2] : Out of bed as early as possible; encourage ambulation

## 2018-09-12 ENCOUNTER — APPOINTMENT (OUTPATIENT)
Dept: PLASTIC SURGERY | Facility: CLINIC | Age: 48
End: 2018-09-12

## 2018-09-13 ENCOUNTER — EMERGENCY (EMERGENCY)
Facility: HOSPITAL | Age: 48
LOS: 1 days | Discharge: HOME | End: 2018-09-13
Attending: EMERGENCY MEDICINE | Admitting: EMERGENCY MEDICINE
Payer: MEDICAID

## 2018-09-13 VITALS
DIASTOLIC BLOOD PRESSURE: 65 MMHG | WEIGHT: 207.9 LBS | RESPIRATION RATE: 18 BRPM | SYSTOLIC BLOOD PRESSURE: 116 MMHG | TEMPERATURE: 98 F | HEART RATE: 71 BPM | OXYGEN SATURATION: 96 % | HEIGHT: 70 IN

## 2018-09-13 DIAGNOSIS — Z98.890 OTHER SPECIFIED POSTPROCEDURAL STATES: Chronic | ICD-10-CM

## 2018-09-13 PROCEDURE — 93010 ELECTROCARDIOGRAM REPORT: CPT

## 2018-09-13 PROCEDURE — 99284 EMERGENCY DEPT VISIT MOD MDM: CPT

## 2018-09-13 PROCEDURE — 71046 X-RAY EXAM CHEST 2 VIEWS: CPT | Mod: 26

## 2018-09-13 RX ORDER — FAMOTIDINE 10 MG/ML
20 INJECTION INTRAVENOUS ONCE
Qty: 0 | Refills: 0 | Status: COMPLETED | OUTPATIENT
Start: 2018-09-13 | End: 2018-09-13

## 2018-09-13 RX ORDER — SODIUM CHLORIDE 9 MG/ML
3 INJECTION INTRAMUSCULAR; INTRAVENOUS; SUBCUTANEOUS ONCE
Qty: 0 | Refills: 0 | Status: COMPLETED | OUTPATIENT
Start: 2018-09-13 | End: 2018-09-13

## 2018-09-13 RX ORDER — ASPIRIN/CALCIUM CARB/MAGNESIUM 324 MG
325 TABLET ORAL ONCE
Qty: 0 | Refills: 0 | Status: COMPLETED | OUTPATIENT
Start: 2018-09-13 | End: 2018-09-13

## 2018-09-13 RX ADMIN — SODIUM CHLORIDE 3 MILLILITER(S): 9 INJECTION INTRAMUSCULAR; INTRAVENOUS; SUBCUTANEOUS at 23:35

## 2018-09-13 NOTE — ED ADULT NURSE NOTE - OBJECTIVE STATEMENT
Pt reports working a long day today coming home eating dinner quickly and then running back out to a meeting. Pt states while he was in the meeting his right shoulder began to hurt which then spread to his scapula and his right upper chest. Pt also reported some epigastric distress and belching with relief. Movement and massage improved the pain. Pt now has pain 2/10 at rest better with movement.

## 2018-09-13 NOTE — ED ADULT NURSE NOTE - NSIMPLEMENTINTERV_GEN_ALL_ED
Implemented All Fall Risk Interventions:  [place] to call system. Call bell, personal items and telephone within reach. Instruct patient to call for assistance. Room bathroom lighting operational. Non-slip footwear when patient is off stretcher. Physically safe environment: no spills, clutter or unnecessary equipment. Stretcher in lowest position, wheels locked, appropriate side rails in place. Provide visual cue, wrist band, yellow gown, etc. Monitor gait and stability. Monitor for mental status changes and reorient to person, place, and time. Review medications for side effects contributing to fall risk. Reinforce activity limits and safety measures with patient and family.

## 2018-09-13 NOTE — ED PROVIDER NOTE - OBJECTIVE STATEMENT
47 y.o. M c/o pressure right upper chest into right shoulder, right upper back, started about 1.5hr ago while in meeting at work, feels like has to burp, some GERD in mid chest, felt SOB, had this feeling once before a couple of years ago and made him stop smoking, 47 y.o. M c/o pressure right upper chest into right shoulder, right upper back, started about 1.5hr ago while in meeting at work, feels like has to burp, some GERD in mid chest, felt SOB, had this feeling once before a couple of years ago and made him stop smoking, today was a very long day, maybe stress related, tonight got home late, ate pasta and meat sauce with mayonnaise, right now discomfort is already subsiding

## 2018-09-14 VITALS
RESPIRATION RATE: 17 BRPM | HEART RATE: 58 BPM | DIASTOLIC BLOOD PRESSURE: 64 MMHG | TEMPERATURE: 98 F | SYSTOLIC BLOOD PRESSURE: 106 MMHG | OXYGEN SATURATION: 96 %

## 2018-09-14 LAB
ALBUMIN SERPL ELPH-MCNC: 3.9 G/DL — SIGNIFICANT CHANGE UP (ref 3.3–5)
ALP SERPL-CCNC: 61 U/L — SIGNIFICANT CHANGE UP (ref 30–120)
ALT FLD-CCNC: 39 U/L DA — SIGNIFICANT CHANGE UP (ref 10–60)
ANION GAP SERPL CALC-SCNC: 9 MMOL/L — SIGNIFICANT CHANGE UP (ref 5–17)
APTT BLD: 30.5 SEC — SIGNIFICANT CHANGE UP (ref 27.5–37.4)
AST SERPL-CCNC: 41 U/L — HIGH (ref 10–40)
BASOPHILS # BLD AUTO: 0.02 K/UL — SIGNIFICANT CHANGE UP (ref 0–0.2)
BASOPHILS NFR BLD AUTO: 0.3 % — SIGNIFICANT CHANGE UP (ref 0–2)
BILIRUB SERPL-MCNC: 0.4 MG/DL — SIGNIFICANT CHANGE UP (ref 0.2–1.2)
BUN SERPL-MCNC: 20 MG/DL — SIGNIFICANT CHANGE UP (ref 7–23)
CALCIUM SERPL-MCNC: 8.7 MG/DL — SIGNIFICANT CHANGE UP (ref 8.4–10.5)
CHLORIDE SERPL-SCNC: 105 MMOL/L — SIGNIFICANT CHANGE UP (ref 96–108)
CK MB BLD-MCNC: 0.6 % — SIGNIFICANT CHANGE UP (ref 0–3.5)
CK MB BLD-MCNC: 0.6 % — SIGNIFICANT CHANGE UP (ref 0–3.5)
CK MB CFR SERPL CALC: 1.4 NG/ML — SIGNIFICANT CHANGE UP (ref 0–3.6)
CK MB CFR SERPL CALC: 1.5 NG/ML — SIGNIFICANT CHANGE UP (ref 0–3.6)
CK SERPL-CCNC: 246 U/L — SIGNIFICANT CHANGE UP (ref 39–308)
CK SERPL-CCNC: 261 U/L — SIGNIFICANT CHANGE UP (ref 39–308)
CO2 SERPL-SCNC: 27 MMOL/L — SIGNIFICANT CHANGE UP (ref 22–31)
CREAT SERPL-MCNC: 0.99 MG/DL — SIGNIFICANT CHANGE UP (ref 0.5–1.3)
D DIMER BLD IA.RAPID-MCNC: <150 NG/ML DDU — SIGNIFICANT CHANGE UP
EOSINOPHIL # BLD AUTO: 0.13 K/UL — SIGNIFICANT CHANGE UP (ref 0–0.5)
EOSINOPHIL NFR BLD AUTO: 2.2 % — SIGNIFICANT CHANGE UP (ref 0–6)
GLUCOSE SERPL-MCNC: 140 MG/DL — HIGH (ref 70–99)
HCT VFR BLD CALC: 41.1 % — SIGNIFICANT CHANGE UP (ref 39–50)
HGB BLD-MCNC: 14 G/DL — SIGNIFICANT CHANGE UP (ref 13–17)
IMM GRANULOCYTES NFR BLD AUTO: 0.2 % — SIGNIFICANT CHANGE UP (ref 0–1.5)
INR BLD: 0.98 RATIO — SIGNIFICANT CHANGE UP (ref 0.88–1.16)
LYMPHOCYTES # BLD AUTO: 2.59 K/UL — SIGNIFICANT CHANGE UP (ref 1–3.3)
LYMPHOCYTES # BLD AUTO: 44 % — SIGNIFICANT CHANGE UP (ref 13–44)
MCHC RBC-ENTMCNC: 30.6 PG — SIGNIFICANT CHANGE UP (ref 27–34)
MCHC RBC-ENTMCNC: 34.1 GM/DL — SIGNIFICANT CHANGE UP (ref 32–36)
MCV RBC AUTO: 89.7 FL — SIGNIFICANT CHANGE UP (ref 80–100)
MONOCYTES # BLD AUTO: 0.5 K/UL — SIGNIFICANT CHANGE UP (ref 0–0.9)
MONOCYTES NFR BLD AUTO: 8.5 % — SIGNIFICANT CHANGE UP (ref 2–14)
NEUTROPHILS # BLD AUTO: 2.63 K/UL — SIGNIFICANT CHANGE UP (ref 1.8–7.4)
NEUTROPHILS NFR BLD AUTO: 44.8 % — SIGNIFICANT CHANGE UP (ref 43–77)
PLATELET # BLD AUTO: 299 K/UL — SIGNIFICANT CHANGE UP (ref 150–400)
POTASSIUM SERPL-MCNC: 4 MMOL/L — SIGNIFICANT CHANGE UP (ref 3.5–5.3)
POTASSIUM SERPL-SCNC: 4 MMOL/L — SIGNIFICANT CHANGE UP (ref 3.5–5.3)
PROT SERPL-MCNC: 7.1 G/DL — SIGNIFICANT CHANGE UP (ref 6–8.3)
PROTHROM AB SERPL-ACNC: 10.7 SEC — SIGNIFICANT CHANGE UP (ref 9.8–12.7)
RBC # BLD: 4.58 M/UL — SIGNIFICANT CHANGE UP (ref 4.2–5.8)
RBC # FLD: 12.1 % — SIGNIFICANT CHANGE UP (ref 10.3–14.5)
SODIUM SERPL-SCNC: 141 MMOL/L — SIGNIFICANT CHANGE UP (ref 135–145)
TROPONIN I SERPL-MCNC: 0 NG/ML — LOW (ref 0.02–0.06)
TROPONIN I SERPL-MCNC: 0 NG/ML — LOW (ref 0.02–0.06)
WBC # BLD: 5.88 K/UL — SIGNIFICANT CHANGE UP (ref 3.8–10.5)
WBC # FLD AUTO: 5.88 K/UL — SIGNIFICANT CHANGE UP (ref 3.8–10.5)

## 2018-09-14 PROCEDURE — 85379 FIBRIN DEGRADATION QUANT: CPT

## 2018-09-14 PROCEDURE — 99284 EMERGENCY DEPT VISIT MOD MDM: CPT | Mod: 25

## 2018-09-14 PROCEDURE — 82550 ASSAY OF CK (CPK): CPT

## 2018-09-14 PROCEDURE — 80053 COMPREHEN METABOLIC PANEL: CPT

## 2018-09-14 PROCEDURE — 93010 ELECTROCARDIOGRAM REPORT: CPT

## 2018-09-14 PROCEDURE — 82553 CREATINE MB FRACTION: CPT

## 2018-09-14 PROCEDURE — 93005 ELECTROCARDIOGRAM TRACING: CPT

## 2018-09-14 PROCEDURE — 85610 PROTHROMBIN TIME: CPT

## 2018-09-14 PROCEDURE — 85730 THROMBOPLASTIN TIME PARTIAL: CPT

## 2018-09-14 PROCEDURE — 84484 ASSAY OF TROPONIN QUANT: CPT

## 2018-09-14 PROCEDURE — 71046 X-RAY EXAM CHEST 2 VIEWS: CPT

## 2018-09-14 PROCEDURE — 96374 THER/PROPH/DIAG INJ IV PUSH: CPT

## 2018-09-14 PROCEDURE — 85027 COMPLETE CBC AUTOMATED: CPT

## 2018-09-14 RX ADMIN — Medication 325 MILLIGRAM(S): at 00:16

## 2018-09-14 RX ADMIN — FAMOTIDINE 20 MILLIGRAM(S): 10 INJECTION INTRAVENOUS at 00:16

## 2018-09-17 NOTE — DISCUSSION/SUMMARY
[ED] : a call from ED [Follow Up Appointment] : follow up appointment with provider [FreeTextEntry1] : 47 y.o. M c/o pressure right upper chest into right shoulder, right upper back, started about 1.5hr ago while in meeting at work, feels like has to burp, some GERD in mid chest, felt SOB, had this feeling once before a couple of years ago and made him stop smoking, today was a very long day, maybe stress related, tonight \par got home late, ate pasta and meat sauce with mayonnaise, right now discomfort is already subsiding.\par Patient had negative trops and CXR, EKG wnl, : Dx Chest pain, unspecified type. [FreeTextEntry3] : S

## 2018-09-18 ENCOUNTER — APPOINTMENT (OUTPATIENT)
Dept: PLASTIC SURGERY | Facility: CLINIC | Age: 48
End: 2018-09-18
Payer: MEDICAID

## 2018-09-18 ENCOUNTER — APPOINTMENT (OUTPATIENT)
Dept: INTERNAL MEDICINE | Facility: CLINIC | Age: 48
End: 2018-09-18
Payer: MEDICAID

## 2018-09-18 VITALS
SYSTOLIC BLOOD PRESSURE: 109 MMHG | RESPIRATION RATE: 16 BRPM | HEIGHT: 70 IN | BODY MASS INDEX: 30.06 KG/M2 | OXYGEN SATURATION: 96 % | HEART RATE: 60 BPM | DIASTOLIC BLOOD PRESSURE: 73 MMHG | WEIGHT: 210 LBS

## 2018-09-18 VITALS
WEIGHT: 210 LBS | SYSTOLIC BLOOD PRESSURE: 120 MMHG | HEIGHT: 70 IN | DIASTOLIC BLOOD PRESSURE: 70 MMHG | BODY MASS INDEX: 30.06 KG/M2

## 2018-09-18 DIAGNOSIS — M54.9 DORSALGIA, UNSPECIFIED: ICD-10-CM

## 2018-09-18 PROCEDURE — 99024 POSTOP FOLLOW-UP VISIT: CPT

## 2018-09-18 PROCEDURE — 99214 OFFICE O/P EST MOD 30 MIN: CPT | Mod: GC

## 2018-09-18 RX ORDER — OXYCODONE AND ACETAMINOPHEN 5; 325 MG/1; MG/1
5-325 TABLET ORAL
Qty: 16 | Refills: 0 | Status: DISCONTINUED | COMMUNITY
Start: 2018-08-22 | End: 2018-09-18

## 2018-09-18 NOTE — PHYSICAL EXAM
[No Acute Distress] : no acute distress [Well Developed] : well developed [Well-Appearing] : well-appearing [Normal Sclera/Conjunctiva] : normal sclera/conjunctiva [PERRL] : pupils equal round and reactive to light [EOMI] : extraocular movements intact [Normal Outer Ear/Nose] : the outer ears and nose were normal in appearance [Normal Oropharynx] : the oropharynx was normal [No JVD] : no jugular venous distention [Supple] : supple [No Respiratory Distress] : no respiratory distress  [Clear to Auscultation] : lungs were clear to auscultation bilaterally [Normal Rate] : normal rate  [Regular Rhythm] : with a regular rhythm [Normal S1, S2] : normal S1 and S2 [No Murmur] : no murmur heard [No Edema] : there was no peripheral edema [No Extremity Clubbing/Cyanosis] : no extremity clubbing/cyanosis [Soft] : abdomen soft [Non Tender] : non-tender [Non-distended] : non-distended [No HSM] : no HSM [Normal Bowel Sounds] : normal bowel sounds [Normal Posterior Cervical Nodes] : no posterior cervical lymphadenopathy [Normal Anterior Cervical Nodes] : no anterior cervical lymphadenopathy [No Spinal Tenderness] : no spinal tenderness [No Joint Swelling] : no joint swelling [Grossly Normal Strength/Tone] : grossly normal strength/tone [No Rash] : no rash [Normal Gait] : normal gait [Coordination Grossly Intact] : coordination grossly intact [No Focal Deficits] : no focal deficits [Normal Affect] : the affect was normal [Normal Insight/Judgement] : insight and judgment were intact

## 2018-09-19 PROBLEM — M54.9 PAIN, UPPER BACK: Status: ACTIVE | Noted: 2018-09-19

## 2018-09-19 NOTE — HISTORY OF PRESENT ILLNESS
[FreeTextEntry1] : 47y M with no chronic medical issues presents for follow up. [de-identified] : 47y M with no chronic medical issues presents for follow up. Patient was recently discharged from [facility] ED on 9/13/18 after presenting for 1 day of right upper chest and shoulder pain which patient didn't feel warranted medical evaluation but family insisted. Had negative cardiac enzymes and d-dimer. CXR showed clear lungs. ECG showed NSR at 63bpm without acute ischemic pattern. Denies any injuries. Patient's pain self-resolved the same day and has not come back since then. 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 Patient states he's been well and denies any symptoms.

## 2018-09-19 NOTE — PLAN
[FreeTextEntry1] : #Right chest and shoulder pain\par - Workup on 9/13 revealed unremarkable CBC/CMP, neg cardiac enzymes, ECG showing NSR without ischemic pattern, clear lungs on CXR\par - Suspicious for musculoskeletal pain vs possible referred abdominal pain\par - Pain self-resolved since discharge; denies any associated symptoms\par - Patient advised to seek medical attention for recurrent cp or sob\par \par RTC in 3 months for CPE\par \par d/w Dr. Sexton

## 2018-09-19 NOTE — ASSESSMENT
[FreeTextEntry1] : 47y M with no known chronic medical issues presents for follow up after discharge from ED last week for right-sided chest and shoulder pain.

## 2018-09-19 NOTE — REVIEW OF SYSTEMS
[Fever] : no fever [Chills] : no chills [Fatigue] : no fatigue [Pain] : no pain [Redness] : no redness [Vision Problems] : no vision problems [Earache] : no earache [Hearing Loss] : no hearing loss [Nasal Discharge] : no nasal discharge [Sore Throat] : no sore throat [Chest Pain] : no chest pain [Palpitations] : no palpitations [Orthopena] : no orthopnea [Shortness Of Breath] : no shortness of breath [Wheezing] : no wheezing [Cough] : no cough [Abdominal Pain] : no abdominal pain [Nausea] : no nausea [Constipation] : no constipation [Diarrhea] : no diarrhea [Vomiting] : no vomiting [Dysuria] : no dysuria [Frequency] : no frequency [Joint Pain] : no joint pain [Joint Stiffness] : no joint stiffness [Joint Swelling] : no joint swelling [Itching] : no itching [Skin Rash] : no skin rash [Headache] : no headache [Dizziness] : no dizziness [Anxiety] : no anxiety [Depression] : no depression [Easy Bleeding] : no easy bleeding [Easy Bruising] : no easy bruising

## 2018-10-15 NOTE — ED PROVIDER NOTE - ATTENDING CONTRIBUTION TO CARE
I, Dr Manuel, have personally performed a face to face diagnostic evaluation on this patient with the PA/NP. I have reviewed the PA/NP's note and agree with the history, Physical exam and plan of care, except for additional note as noted below.    Pertinent PE generalized abdominal pain with umbilical hernia tender to palpation, no guarding or rebound.
Yes

## 2018-12-08 ENCOUNTER — EMERGENCY (EMERGENCY)
Facility: HOSPITAL | Age: 48
LOS: 1 days | End: 2018-12-08
Attending: EMERGENCY MEDICINE | Admitting: EMERGENCY MEDICINE
Payer: MEDICAID

## 2018-12-08 VITALS
OXYGEN SATURATION: 96 % | TEMPERATURE: 98 F | HEART RATE: 71 BPM | DIASTOLIC BLOOD PRESSURE: 78 MMHG | SYSTOLIC BLOOD PRESSURE: 126 MMHG | WEIGHT: 210.1 LBS | HEIGHT: 70 IN | RESPIRATION RATE: 20 BRPM

## 2018-12-08 VITALS
OXYGEN SATURATION: 97 % | SYSTOLIC BLOOD PRESSURE: 122 MMHG | TEMPERATURE: 98 F | DIASTOLIC BLOOD PRESSURE: 72 MMHG | HEART RATE: 61 BPM | RESPIRATION RATE: 16 BRPM

## 2018-12-08 DIAGNOSIS — Z98.890 OTHER SPECIFIED POSTPROCEDURAL STATES: Chronic | ICD-10-CM

## 2018-12-08 PROCEDURE — 71250 CT THORAX DX C-: CPT | Mod: 26

## 2018-12-08 PROCEDURE — 99284 EMERGENCY DEPT VISIT MOD MDM: CPT | Mod: 25

## 2018-12-08 PROCEDURE — 71250 CT THORAX DX C-: CPT

## 2018-12-08 PROCEDURE — 99284 EMERGENCY DEPT VISIT MOD MDM: CPT

## 2018-12-08 RX ORDER — ACETAMINOPHEN 500 MG
325 TABLET ORAL
Qty: 0 | Refills: 0 | COMMUNITY

## 2018-12-08 RX ORDER — OXYCODONE AND ACETAMINOPHEN 5; 325 MG/1; MG/1
2 TABLET ORAL ONCE
Qty: 0 | Refills: 0 | Status: DISCONTINUED | OUTPATIENT
Start: 2018-12-08 | End: 2018-12-08

## 2018-12-08 RX ADMIN — OXYCODONE AND ACETAMINOPHEN 2 TABLET(S): 5; 325 TABLET ORAL at 16:48

## 2018-12-08 RX ADMIN — OXYCODONE AND ACETAMINOPHEN 2 TABLET(S): 5; 325 TABLET ORAL at 16:49

## 2018-12-08 NOTE — ED ADULT NURSE NOTE - NSIMPLEMENTINTERV_GEN_ALL_ED
Implemented All Fall Risk Interventions:  [place] to call system. Call bell, personal items and telephone within reach. Instruct patient to call for assistance. Room bathroom lighting operational. Non-slip footwear when patient is off stretcher. Physically safe environment: no spills, clutter or unnecessary equipment. Stretcher in lowest position, wheels locked, appropriate side rails in place. Provide visual cue, wrist band, yellow gown, etc. Monitor gait and stability. Monitor for mental status changes and reorient to person, place, and time. Review medications for side effects contributing to fall risk. Reinforce activity limits and safety measures with patient and family.

## 2018-12-08 NOTE — ED ADULT NURSE NOTE - OBJECTIVE STATEMENT
Pt presents stating he fell 3 steps off a ladder approximately 1 hour ago. States corner of ladder hit right rib cage. Lungs clear on auscultation with bilateral breath sounds audible. Large abraded area noted over right anterior rib cage

## 2018-12-08 NOTE — ED PROVIDER NOTE - OBJECTIVE STATEMENT
46 y/o M pt with no significant PMHx presents to the ED c/o pain in the right side rib area with an abrasion and right upper back s/p fall off of a ladder today approximately 30 minutes ago. Pt states that he was on a ladder 3 steps high when he lost his balance and fell. He states that he hit the right side of his ribs on the side of the ladder. Pain in chest increased with deep breathing and pain in the upper back increased with movement of the arms. Denies LOC, nausea, vomiting, or SOB. No other complaints at this time.

## 2018-12-10 NOTE — DISCUSSION/SUMMARY
[FreeTextEntry1] : ED HPI: "46 y/o M pt with no significant PMHx presents to the ED c/o pain in the right side rib area with an abrasion and right upper back s/p fall off of a ladder today approximately 30 minutes ago. Pt states that he was on a ladder 3 steps high when he lost his balance and fell. He states that he hit the right side of his ribs on the side of the ladder. Pain in chest increased with deep breathing and pain in the upper back increased with movement of the arms. Denies LOC, nausea, vomiting, or SOB. No other complaints at this time."\par \par ED Course: CT Chest showed no rib fractures on pulmonary process. Diagnosed with rib contusion. Discharged on Aleve.

## 2019-01-18 NOTE — ED PROVIDER NOTE - SKIN WOUND TYPE
no tingling/no weakness/no fever/no numbness/no nausea/no pain/no dizziness/no decreased eating/drinking/no chills/no vomiting ABRASION(S)

## 2019-01-31 ENCOUNTER — TRANSCRIPTION ENCOUNTER (OUTPATIENT)
Age: 49
End: 2019-01-31

## 2019-02-11 ENCOUNTER — APPOINTMENT (OUTPATIENT)
Dept: SURGICAL ONCOLOGY | Facility: CLINIC | Age: 49
End: 2019-02-11

## 2019-02-11 ENCOUNTER — EMERGENCY (EMERGENCY)
Facility: HOSPITAL | Age: 49
LOS: 1 days | Discharge: HOME | End: 2019-02-11
Attending: EMERGENCY MEDICINE | Admitting: EMERGENCY MEDICINE
Payer: MEDICAID

## 2019-02-11 VITALS
SYSTOLIC BLOOD PRESSURE: 135 MMHG | WEIGHT: 210.1 LBS | HEIGHT: 71 IN | TEMPERATURE: 98 F | DIASTOLIC BLOOD PRESSURE: 81 MMHG | OXYGEN SATURATION: 97 % | RESPIRATION RATE: 16 BRPM | HEART RATE: 68 BPM

## 2019-02-11 DIAGNOSIS — Z98.890 OTHER SPECIFIED POSTPROCEDURAL STATES: Chronic | ICD-10-CM

## 2019-02-11 PROCEDURE — 99284 EMERGENCY DEPT VISIT MOD MDM: CPT | Mod: 25

## 2019-02-11 PROCEDURE — 74177 CT ABD & PELVIS W/CONTRAST: CPT

## 2019-02-11 PROCEDURE — 83605 ASSAY OF LACTIC ACID: CPT

## 2019-02-11 PROCEDURE — 74177 CT ABD & PELVIS W/CONTRAST: CPT | Mod: 26

## 2019-02-11 PROCEDURE — 36415 COLL VENOUS BLD VENIPUNCTURE: CPT

## 2019-02-11 PROCEDURE — 96375 TX/PRO/DX INJ NEW DRUG ADDON: CPT

## 2019-02-11 PROCEDURE — 96374 THER/PROPH/DIAG INJ IV PUSH: CPT

## 2019-02-11 PROCEDURE — 81001 URINALYSIS AUTO W/SCOPE: CPT

## 2019-02-11 PROCEDURE — 80053 COMPREHEN METABOLIC PANEL: CPT

## 2019-02-11 PROCEDURE — 83690 ASSAY OF LIPASE: CPT

## 2019-02-11 PROCEDURE — 99285 EMERGENCY DEPT VISIT HI MDM: CPT

## 2019-02-11 PROCEDURE — 85027 COMPLETE CBC AUTOMATED: CPT

## 2019-02-11 RX ORDER — FAMOTIDINE 10 MG/ML
20 INJECTION INTRAVENOUS ONCE
Refills: 0 | Status: COMPLETED | OUTPATIENT
Start: 2019-02-11 | End: 2019-02-11

## 2019-02-11 RX ORDER — SODIUM CHLORIDE 9 MG/ML
1000 INJECTION INTRAMUSCULAR; INTRAVENOUS; SUBCUTANEOUS ONCE
Refills: 0 | Status: COMPLETED | OUTPATIENT
Start: 2019-02-11 | End: 2019-02-11

## 2019-02-11 RX ORDER — ONDANSETRON 8 MG/1
4 TABLET, FILM COATED ORAL ONCE
Refills: 0 | Status: COMPLETED | OUTPATIENT
Start: 2019-02-11 | End: 2019-02-11

## 2019-02-11 RX ORDER — SODIUM CHLORIDE 9 MG/ML
1000 INJECTION INTRAMUSCULAR; INTRAVENOUS; SUBCUTANEOUS
Refills: 0 | Status: DISCONTINUED | OUTPATIENT
Start: 2019-02-11 | End: 2019-02-15

## 2019-02-11 RX ORDER — POTASSIUM CHLORIDE 20 MEQ
40 PACKET (EA) ORAL ONCE
Qty: 0 | Refills: 0 | Status: COMPLETED | OUTPATIENT
Start: 2019-02-11 | End: 2019-02-11

## 2019-02-11 RX ORDER — SODIUM CHLORIDE 9 MG/ML
3 INJECTION INTRAMUSCULAR; INTRAVENOUS; SUBCUTANEOUS ONCE
Refills: 0 | Status: COMPLETED | OUTPATIENT
Start: 2019-02-11 | End: 2019-02-11

## 2019-02-11 RX ADMIN — Medication 40 MILLIEQUIVALENT(S): at 19:54

## 2019-02-11 RX ADMIN — SODIUM CHLORIDE 125 MILLILITER(S): 9 INJECTION INTRAMUSCULAR; INTRAVENOUS; SUBCUTANEOUS at 19:09

## 2019-02-11 RX ADMIN — SODIUM CHLORIDE 1000 MILLILITER(S): 9 INJECTION INTRAMUSCULAR; INTRAVENOUS; SUBCUTANEOUS at 17:36

## 2019-02-11 RX ADMIN — SODIUM CHLORIDE 3 MILLILITER(S): 9 INJECTION INTRAMUSCULAR; INTRAVENOUS; SUBCUTANEOUS at 17:36

## 2019-02-11 RX ADMIN — FAMOTIDINE 20 MILLIGRAM(S): 10 INJECTION INTRAVENOUS at 17:35

## 2019-02-11 RX ADMIN — ONDANSETRON 4 MILLIGRAM(S): 8 TABLET, FILM COATED ORAL at 17:35

## 2019-02-11 NOTE — ED PROVIDER NOTE - NSFOLLOWUPCLINICS_GEN_ALL_ED_FT
[facility] - Primary Care  Primary Care  [address] [place] [address]  Phone: [phone]  Fax:   Follow Up Time:

## 2019-02-11 NOTE — ED PROVIDER NOTE - MEDICAL DECISION MAKING DETAILS
abd pain sp recent illness remote hx of c diff monitor labs for inflammation dehydration infection ct scan for eval of pain and iv hydration reassurance to pt and referral to pmd at [facility] medical [facility]

## 2019-02-11 NOTE — ED PROVIDER NOTE - GASTROINTESTINAL, MLM
Abdomen soft, non-tender, no guarding, non-distended. Abdomen soft, non-tender, no guarding, non-distended. no rebound no cvat

## 2019-02-11 NOTE — ED PROVIDER NOTE - NSFOLLOWUPINSTRUCTIONS_ED_ALL_ED_FT
Clear liquid diet advance slowly as tolerated to  Bananas Rice Tea and Toast (with margarine or jam)  then advance to Baked and boiled (eggs, pasta, chicken)  once tolerated you may advance slowly to regular  Eat small volumes   NO fatty fried foods, no milk or mild products, no tomato, spice, mint, tobacco, alcohol, caffeine  Stay well hydrated: a teaspoon at a time until able to tolerate normal intake.  Prompt follow up with your doctor is essential  return for worsening symptoms or any problems/concerns  pepto-bismal turns your stools black  no milk or milk products (cheese, butter, ice cream etc)

## 2019-02-11 NOTE — ED ADULT NURSE NOTE - NSIMPLEMENTINTERV_GEN_ALL_ED
Implemented All Universal Safety Interventions:  [place] to call system. Call bell, personal items and telephone within reach. Instruct patient to call for assistance. Room bathroom lighting operational. Non-slip footwear when patient is off stretcher. Physically safe environment: no spills, clutter or unnecessary equipment. Stretcher in lowest position, wheels locked, appropriate side rails in place.

## 2019-02-11 NOTE — ED PROVIDER NOTE - OBJECTIVE STATEMENT
49 y/o M pt presents to the ED c/o abd pain for a couple of days. Hx of both his children had nausea and vomiting last week, they were seen at another hospital ED. He states that his mother-in-law and sister-in-law had chills on the same day. Pt states that his sister-in-law had a syncopal episode the day after that and passed away in the [place] in [place] from a blood clot in her lungs. Pt states that his family's sx have resolved. He states that he went out for food on Friday night and developed abd pain afterwards. Pain was not relieved with bowel movement or urination. Pt took some Pepto-Bismol and had diarrhea, in which stool was black in color. Similar stool color today. Associated with nausea and vomiting until yesterday. Family hx of colon cancer in father. Pt had a colonoscopy 3 months ago, which was reported to be normal. Non-smoker, no EtOH use, no illicit drug use. Denies fever, chills, nausea, or vomiting currently. No other complaints at this time.     [facility] 47 y/o M pt presents to the ED c/o abd pain for a couple of days. Hx of both his children had nausea and vomiting last week, they were seen at another hospital ED. He states that his mother-in-law and sister-in-law had chills on the same day. Pt states that his sister-in-law had a syncopal episode the day after that and passed away in the ED in [place] from a blood clot in her lungs. Pt states that his family's sx have resolved. He states that he went out for food on Friday night and developed abd pain afterwards. Pain was not relieved with bowel movement or urination. Pt took some Pepto-Bismol and had soft loose stools in which stool was black in color. Similar stool color today after repeated dosing of pepto. Associated with nausea and vomiting until yesterday. Family hx of colon cancer in father. Pt had a colonoscopy 3 months ago, which was reported to be normal. Non-smoker, no EtOH use, no illicit drug use. Denies fever, chills, nausea, or vomiting currently. No other complaints at this time.     goes to the [facility]

## 2019-02-11 NOTE — ED PROVIDER NOTE - CARE PROVIDER_API CALL
Pb Grove (DO)  Gastroenterology; Internal Medicine  [address] [place] [id number]  Phone: [phone]  Fax: [phone]  Follow Up Time:

## 2019-02-27 ENCOUNTER — APPOINTMENT (OUTPATIENT)
Dept: INTERNAL MEDICINE | Facility: CLINIC | Age: 49
End: 2019-02-27

## 2019-03-07 ENCOUNTER — APPOINTMENT (OUTPATIENT)
Dept: SURGICAL ONCOLOGY | Facility: CLINIC | Age: 49
End: 2019-03-07
Payer: MEDICAID

## 2019-03-07 VITALS
RESPIRATION RATE: 15 BRPM | HEART RATE: 72 BPM | HEIGHT: 70 IN | SYSTOLIC BLOOD PRESSURE: 143 MMHG | DIASTOLIC BLOOD PRESSURE: 76 MMHG | BODY MASS INDEX: 31.07 KG/M2 | WEIGHT: 217 LBS

## 2019-03-07 DIAGNOSIS — D23.71 OTHER BENIGN NEOPLASM OF SKIN OF RIGHT LOWER LIMB, INCLUDING HIP: ICD-10-CM

## 2019-03-07 PROCEDURE — 99214 OFFICE O/P EST MOD 30 MIN: CPT

## 2019-03-07 NOTE — PHYSICAL EXAM
[Normal] : supple, no neck mass and thyroid not enlarged [Normal Neck Lymph Nodes] : normal neck lymph nodes  [Normal Supraclavicular Lymph Nodes] : normal supraclavicular lymph nodes [Normal Groin Lymph Nodes] : normal groin lymph nodes [Normal Axillary Lymph Nodes] : normal axillary lymph nodes [Normal] : normal appearance, no rash, nodules, vesicles, ulcers, erythema

## 2019-03-22 ENCOUNTER — FORM ENCOUNTER (OUTPATIENT)
Age: 49
End: 2019-03-22

## 2019-03-22 ENCOUNTER — APPOINTMENT (OUTPATIENT)
Dept: INTERNAL MEDICINE | Facility: CLINIC | Age: 49
End: 2019-03-22

## 2019-03-23 ENCOUNTER — APPOINTMENT (OUTPATIENT)
Dept: RADIOLOGY | Facility: CLINIC | Age: 49
End: 2019-03-23
Payer: MEDICAID

## 2019-03-23 ENCOUNTER — OUTPATIENT (OUTPATIENT)
Dept: OUTPATIENT SERVICES | Facility: HOSPITAL | Age: 49
LOS: 1 days | End: 2019-03-23
Payer: MEDICAID

## 2019-03-23 ENCOUNTER — APPOINTMENT (OUTPATIENT)
Dept: MRI IMAGING | Facility: CLINIC | Age: 49
End: 2019-03-23
Payer: MEDICAID

## 2019-03-23 DIAGNOSIS — Z00.8 ENCOUNTER FOR OTHER GENERAL EXAMINATION: ICD-10-CM

## 2019-03-23 DIAGNOSIS — Z98.890 OTHER SPECIFIED POSTPROCEDURAL STATES: Chronic | ICD-10-CM

## 2019-03-23 PROCEDURE — 71046 X-RAY EXAM CHEST 2 VIEWS: CPT

## 2019-03-23 PROCEDURE — 71046 X-RAY EXAM CHEST 2 VIEWS: CPT | Mod: 26

## 2019-03-23 PROCEDURE — A9585: CPT

## 2019-03-23 PROCEDURE — 73723 MRI JOINT LWR EXTR W/O&W/DYE: CPT | Mod: 26,LT

## 2019-03-23 PROCEDURE — 73723 MRI JOINT LWR EXTR W/O&W/DYE: CPT

## 2019-03-30 NOTE — PHYSICAL THERAPY INITIAL EVALUATION ADULT - ACTIVE RANGE OF MOTION EXAMINATION, REHAB EVAL
present Right UE Active ROM was WFL (within functional limits)/Left UE Active ROM was WFL (within functional limits)/Right LE Active ROM was WFL (within functional limits)/deficits as listed below/L LE knee flexion limited secondary to pain/incision site

## 2019-03-31 NOTE — HISTORY OF PRESENT ILLNESS
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 Sher from neurosurgery, and reconstruction by Dr. Nguyen.\par \par His postoperative course was protracted by severe constipation from narcotic analgesics requiring readmission.\par \par ~January 2018 (cannot specify duration) he noticed a nodule on the inner aspect of his right leg. 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 Slava Torres.\par He has no significant past medical history.\par He takes no regular medications

## 2019-03-31 NOTE — ASSESSMENT
[FreeTextEntry1] : Annual chest x-ray, and left thigh MRI will be March 2019, prescriptions verified.\par \par If unremarkable we will see him in approximately 12 months, sooner if needed\par \par \par 03-31-19.\par We spoke.\par His March 23, 2019, chest x-ray and MRI of his left thigh@[facility] were unremarkable, neither demonstrate any evidence of recurrent disease.\par BOTH will be repeated in another year, March 2020..................................

## 2019-03-31 NOTE — REASON FOR VISIT
[Follow-Up Visit] : a follow-up visit for [Other: _____] : [unfilled] [FreeTextEntry2] : Left thigh schwannoma, right leg dermatofibroma

## 2019-04-14 ENCOUNTER — EMERGENCY (EMERGENCY)
Facility: HOSPITAL | Age: 49
LOS: 1 days | Discharge: HOME | End: 2019-04-14
Attending: EMERGENCY MEDICINE | Admitting: EMERGENCY MEDICINE
Payer: MEDICAID

## 2019-04-14 VITALS
WEIGHT: 214.95 LBS | HEIGHT: 70 IN | TEMPERATURE: 98 F | OXYGEN SATURATION: 98 % | HEART RATE: 70 BPM | DIASTOLIC BLOOD PRESSURE: 76 MMHG | SYSTOLIC BLOOD PRESSURE: 141 MMHG | RESPIRATION RATE: 20 BRPM

## 2019-04-14 DIAGNOSIS — Z98.890 OTHER SPECIFIED POSTPROCEDURAL STATES: Chronic | ICD-10-CM

## 2019-04-14 LAB
ALBUMIN SERPL ELPH-MCNC: 3.8 G/DL — SIGNIFICANT CHANGE UP (ref 3.3–5)
ALP SERPL-CCNC: 63 U/L — SIGNIFICANT CHANGE UP (ref 30–120)
ALT FLD-CCNC: 41 U/L DA — SIGNIFICANT CHANGE UP (ref 10–60)
ANION GAP SERPL CALC-SCNC: 11 MMOL/L — SIGNIFICANT CHANGE UP (ref 5–17)
APTT BLD: 29.9 SEC — SIGNIFICANT CHANGE UP (ref 28.5–37)
AST SERPL-CCNC: 24 U/L — SIGNIFICANT CHANGE UP (ref 10–40)
BASOPHILS # BLD AUTO: 0.02 K/UL — SIGNIFICANT CHANGE UP (ref 0–0.2)
BASOPHILS NFR BLD AUTO: 0.4 % — SIGNIFICANT CHANGE UP (ref 0–2)
BILIRUB SERPL-MCNC: 0.2 MG/DL — SIGNIFICANT CHANGE UP (ref 0.2–1.2)
BUN SERPL-MCNC: 17 MG/DL — SIGNIFICANT CHANGE UP (ref 7–23)
CALCIUM SERPL-MCNC: 8.8 MG/DL — SIGNIFICANT CHANGE UP (ref 8.4–10.5)
CHLORIDE SERPL-SCNC: 106 MMOL/L — SIGNIFICANT CHANGE UP (ref 96–108)
CO2 SERPL-SCNC: 25 MMOL/L — SIGNIFICANT CHANGE UP (ref 22–31)
CREAT SERPL-MCNC: 0.92 MG/DL — SIGNIFICANT CHANGE UP (ref 0.5–1.3)
EOSINOPHIL # BLD AUTO: 0.13 K/UL — SIGNIFICANT CHANGE UP (ref 0–0.5)
EOSINOPHIL NFR BLD AUTO: 2.4 % — SIGNIFICANT CHANGE UP (ref 0–6)
GLUCOSE SERPL-MCNC: 131 MG/DL — HIGH (ref 70–99)
HCT VFR BLD CALC: 42 % — SIGNIFICANT CHANGE UP (ref 39–50)
HGB BLD-MCNC: 14.4 G/DL — SIGNIFICANT CHANGE UP (ref 13–17)
IMM GRANULOCYTES NFR BLD AUTO: 0.2 % — SIGNIFICANT CHANGE UP (ref 0–1.5)
INR BLD: 1.02 RATIO — SIGNIFICANT CHANGE UP (ref 0.88–1.16)
LYMPHOCYTES # BLD AUTO: 2.35 K/UL — SIGNIFICANT CHANGE UP (ref 1–3.3)
LYMPHOCYTES # BLD AUTO: 43.8 % — SIGNIFICANT CHANGE UP (ref 13–44)
MCHC RBC-ENTMCNC: 30.4 PG — SIGNIFICANT CHANGE UP (ref 27–34)
MCHC RBC-ENTMCNC: 34.3 GM/DL — SIGNIFICANT CHANGE UP (ref 32–36)
MCV RBC AUTO: 88.6 FL — SIGNIFICANT CHANGE UP (ref 80–100)
MONOCYTES # BLD AUTO: 0.49 K/UL — SIGNIFICANT CHANGE UP (ref 0–0.9)
MONOCYTES NFR BLD AUTO: 9.1 % — SIGNIFICANT CHANGE UP (ref 2–14)
NEUTROPHILS # BLD AUTO: 2.37 K/UL — SIGNIFICANT CHANGE UP (ref 1.8–7.4)
NEUTROPHILS NFR BLD AUTO: 44.1 % — SIGNIFICANT CHANGE UP (ref 43–77)
NRBC # BLD: 0 /100 WBCS — SIGNIFICANT CHANGE UP (ref 0–0)
PLATELET # BLD AUTO: 271 K/UL — SIGNIFICANT CHANGE UP (ref 150–400)
POTASSIUM SERPL-MCNC: 3.6 MMOL/L — SIGNIFICANT CHANGE UP (ref 3.5–5.3)
POTASSIUM SERPL-SCNC: 3.6 MMOL/L — SIGNIFICANT CHANGE UP (ref 3.5–5.3)
PROT SERPL-MCNC: 6.9 G/DL — SIGNIFICANT CHANGE UP (ref 6–8.3)
PROTHROM AB SERPL-ACNC: 11.1 SEC — SIGNIFICANT CHANGE UP (ref 10–12.9)
RBC # BLD: 4.74 M/UL — SIGNIFICANT CHANGE UP (ref 4.2–5.8)
RBC # FLD: 12 % — SIGNIFICANT CHANGE UP (ref 10.3–14.5)
SODIUM SERPL-SCNC: 142 MMOL/L — SIGNIFICANT CHANGE UP (ref 135–145)
TROPONIN I SERPL-MCNC: 0 NG/ML — LOW (ref 0.02–0.06)
WBC # BLD: 5.37 K/UL — SIGNIFICANT CHANGE UP (ref 3.8–10.5)
WBC # FLD AUTO: 5.37 K/UL — SIGNIFICANT CHANGE UP (ref 3.8–10.5)

## 2019-04-14 PROCEDURE — 93010 ELECTROCARDIOGRAM REPORT: CPT

## 2019-04-14 PROCEDURE — 71046 X-RAY EXAM CHEST 2 VIEWS: CPT | Mod: 26

## 2019-04-14 PROCEDURE — 99283 EMERGENCY DEPT VISIT LOW MDM: CPT

## 2019-04-14 RX ORDER — ACETAMINOPHEN 500 MG
650 TABLET ORAL ONCE
Qty: 0 | Refills: 0 | Status: COMPLETED | OUTPATIENT
Start: 2019-04-14 | End: 2019-04-14

## 2019-04-14 RX ORDER — ASPIRIN/CALCIUM CARB/MAGNESIUM 324 MG
325 TABLET ORAL ONCE
Qty: 0 | Refills: 0 | Status: COMPLETED | OUTPATIENT
Start: 2019-04-14 | End: 2019-04-14

## 2019-04-14 RX ADMIN — Medication 650 MILLIGRAM(S): at 23:40

## 2019-04-14 RX ADMIN — Medication 325 MILLIGRAM(S): at 20:09

## 2019-04-14 RX ADMIN — Medication 650 MILLIGRAM(S): at 22:41

## 2019-04-14 NOTE — ED PROVIDER NOTE - CHPI ED SYMPTOMS NEG
no chills/no diaphoresis/no nausea/no shortness of breath/no back pain/no cough/no fever/no vomiting/no syncope

## 2019-04-14 NOTE — ED PROVIDER NOTE - MUSCULOSKELETAL, MLM
Spine appears normal, range of motion is not limited, no muscle or joint tenderness. FROM all extremities, MS 5/5

## 2019-04-14 NOTE — ED PROVIDER NOTE - OBJECTIVE STATEMENT
47 yo male [resent ot Ed c/o chest pain today about 1 hour ago that began while eating dinner in a restaurant. Described pain as a pressure/tightness, lasted 15 minutes and is now resolved. Pain with associated left arm numbness and headache. Patient is a non-smoker, no family h/o MI. Patient currently pain free. During episode, patient felt like he would pass out. 47 yo male present to ED c/o chest pain today about 1 hour ago that began while eating dinner in a restaurant. Described pain as a pressure/tightness, lasted 15 minutes and is now resolved. Pain with associated left arm numbness and headache. Patient is a non-smoker, no family h/o MI. Patient currently pain free. During episode, patient felt like he would pass out.

## 2019-04-14 NOTE — ED PROVIDER NOTE - PROGRESS NOTE DETAILS
patient signed out to Dr. Blount to f/up second trop at 2am with repeat EKG. patient has headache, tylenol ordered

## 2019-04-14 NOTE — ED PROVIDER NOTE - CLINICAL SUMMARY MEDICAL DECISION MAKING FREE TEXT BOX
48 male with 15 minutes chest pressure tonight. EKG NSR, 2 sets cardiac enzymes, labs, cxr, repeat EKG, asa.

## 2019-04-14 NOTE — ED ADULT NURSE NOTE - NURSING NEURO ORIENTATION
Chief complaint:   Chief Complaint   Patient presents with   • Medicare Wellness Visit       Vitals:  Visit Vitals   • /90   • Pulse 80   • Ht 5' 2\" (1.575 m)   • Wt 88.5 kg   • BMI 35.67 kg/m2       HISTORY OF PRESENT ILLNESS     HPI    This is a 49 year old female who presents today for a Medicare Wellness Visit. She has no complaints. She has had some stress with her  needing valve surgery.     PREVENTIVE HEALTH:    Diet:  decent  Exercise:  active but does not specifically excercise  Self exam:  monthly  Advance directive:  discussed and advised to complete one  Last eye exam:  within the last year  Hearing:  normal by whisper test    No exam data present    Screening for abdominal aortic aneurysm:  not indicated    Tobacco history:   reports that she has never smoked. She has never used smokeless tobacco.  Alcohol history:   reports that she does not drink alcohol.    Last lipid testing:  acceptable    CHOLESTEROL (mg/dL)   Date Value   12/28/2012 160     CALCULATED LDL (mg/dL)   Date Value   12/28/2012 81     HDL (mg/dL)   Date Value   12/28/2012 44     TRIGLYCERIDE (mg/dL)   Date Value   12/28/2012 177 (H)       Last glucose:  normal    Glucose (mg/dL)   Date Value   12/28/2012 95       Immunizations:  TDaP/Td:  refuses  Pneumococcal:  not indicated  Influenza:  refuses  Zoster:  not indicated    Immunization History   Administered Date(s) Administered   • Influenza 01/24/2017       Health Maintenance:  Colonoscopy:  not indicated  DEXA scan:  not indicated  PAP smear:  up to date  Mammogram:  due    Health Maintenance   Topic Date Due   • Td/TDaP Vaccine  05/05/2017 (Originally 12/30/1985)   • Breast Cancer Screening  03/31/2018   • Influenza Vaccine  Completed       Current providers: Care Team was updated    There is no evidence of cognitive impairment by direct observation.    I reviewed the Health Risk Assessment in its entirety and it was entered into the electronic health  record.      Other significant problems:  Patient Active Problem List    Diagnosis Date Noted   • Benign essential HTN 01/08/2015     Priority: Low   • History of stroke 01/08/2015     Priority: Low       PAST MEDICAL, FAMILY AND SOCIAL HISTORY     Medications:  Current Outpatient Prescriptions   Medication   • Multiple Vitamins-Minerals (MULTIVITAMIN PO)   • aspirin 81 MG tablet   • lisinopril (ZESTRIL) 5 MG tablet       Allergies:  ALLERGIES:  No Known Allergies    Past Medical  History/Surgeries:  Past Medical History   Diagnosis Date   • Arterial ischemic stroke, vertebrobasilar, brainstem, remote, resolved    • Essential (primary) hypertension    • Hypercholesterolemia        Past Surgical History   Procedure Laterality Date   • Mammo screening bilateral  05/09/2012   • Pap smear,routine  04/19/2012       Family History:  Family History   Problem Relation Age of Onset   • High blood pressure Mother    • High cholesterol Mother    • Arthritis Father    • High cholesterol Father    • Arthritis Maternal Grandmother    • High blood pressure Maternal Grandmother    • Kidney disease Maternal Grandmother    • Heart disease Maternal Grandfather        Social History:  Social History   Substance Use Topics   • Smoking status: Never Smoker   • Smokeless tobacco: Never Used   • Alcohol use No       REVIEW OF SYSTEMS     Review of Systems   All other systems reviewed and are negative.      PHYSICAL EXAM     Physical Exam   Constitutional: She is oriented to person, place, and time. She appears well-developed and well-nourished.  Non-toxic appearance. No distress.   HENT:   Head: Normocephalic and atraumatic.   Right Ear: Tympanic membrane, external ear and ear canal normal.   Left Ear: Tympanic membrane, external ear and ear canal normal.   Nose: Nose normal. No mucosal edema or rhinorrhea.   Mouth/Throat: Oropharynx is clear and moist. No oral lesions. No oropharyngeal exudate.   Eyes: Conjunctivae, EOM and lids are  normal. Pupils are equal, round, and reactive to light. Right eye exhibits no discharge. Left eye exhibits no discharge. No scleral icterus.   Neck: Trachea normal. Neck supple. No JVD present. Carotid bruit is not present. No thyroid mass and no thyromegaly present.   Cardiovascular: Normal rate, regular rhythm, normal heart sounds and intact distal pulses.  Exam reveals no gallop and no friction rub.    No murmur heard.  Pulmonary/Chest: Effort normal and breath sounds normal. No respiratory distress. She has no wheezes. She has no rales. Chest wall is not dull to percussion. Right breast exhibits no mass, no nipple discharge, no skin change and no tenderness. Left breast exhibits no mass, no nipple discharge, no skin change and no tenderness.   Abdominal: Soft. Normal appearance and bowel sounds are normal. She exhibits no distension and no mass. There is no splenomegaly or hepatomegaly. There is no tenderness. There is no rigidity, no rebound and no guarding.   Musculoskeletal: Normal range of motion. She exhibits no edema or tenderness.   Lymphadenopathy:     She has no cervical adenopathy.     She has no axillary adenopathy.   Neurological: She is alert and oriented to person, place, and time. She has normal strength and normal reflexes. No cranial nerve deficit or sensory deficit. Coordination and gait normal.   Skin: Skin is warm and dry. No rash noted. She is not diaphoretic. No cyanosis. No pallor. Nails show no clubbing.   Psychiatric: She has a normal mood and affect. Her behavior is normal. Thought content normal.   Vitals reviewed.      ASSESSMENT/PLAN     ASSESSMENT:  1. Medicare annual wellness visit, subsequent    2. Encounter for screening mammogram for malignant neoplasm of breast    3. Benign essential HTN - controlled    4. Distant hx of stroke- no recent symptoms  I recommend that the patient exercise 5 days a week for 40 minutes a day as well as follow a heart healthy diet. I also recommend   of dietary calcium daily with 1-2000 international units vitamin D      PLAN:  Orders Placed This Encounter   • vitamin B-12 (CYANOCOBALAMIN) 1000 MCG tablet       No Follow-up on file.                     oriented to person, place and time

## 2019-04-14 NOTE — ED PROVIDER NOTE - ATTENDING CONTRIBUTION TO CARE
Ricky Blount MD: I have personally performed a face to face diagnostic evaluation on this patient.  I have reviewed the PA note and agree with the history, exam, and plan of care, except as noted.  History and Exam by me shows same findings as documented  Attending Note: Patient with 15 minute episode of chest pressure, now resolved. Occurred while eating in a restaurant. No significant cardiac risk factors. Exam and EKG normal. Agree with plan

## 2019-04-14 NOTE — ED PROVIDER NOTE - CARE PLAN
Jordin TiffanyJr. was seen in the clinic today to  his Phonak [employer] B70-R hearing aids with #2 standard receivers and medium power domes.    Acclimatization was set to 80%. MPO was increased. Whistleblock was enabled. Mr. Allen was pleased with how the hearing aids sounded. The alerts were demonstrated. Mr. Allen was shown how to properly place the hearing aids in the  and turn them on/off. Insertion/removal and care and maintenance were also reviewed.    A 2 week follow-up appointment was scheduled. Mr. Allen was encouraged to call the clinic if he needed to be seen sooner.   
Principal Discharge DX:	Chest pain, unspecified type

## 2019-04-14 NOTE — ED ADULT TRIAGE NOTE - CHIEF COMPLAINT QUOTE
I was in a restaurant and started to feel weird, I went home and the chest pain and shortness of breathe started.

## 2019-04-15 VITALS
RESPIRATION RATE: 17 BRPM | OXYGEN SATURATION: 97 % | DIASTOLIC BLOOD PRESSURE: 77 MMHG | SYSTOLIC BLOOD PRESSURE: 107 MMHG | HEART RATE: 66 BPM

## 2019-04-15 LAB — TROPONIN I SERPL-MCNC: 0 NG/ML — LOW (ref 0.02–0.06)

## 2019-04-15 PROCEDURE — 71046 X-RAY EXAM CHEST 2 VIEWS: CPT

## 2019-04-15 PROCEDURE — 93010 ELECTROCARDIOGRAM REPORT: CPT

## 2019-04-15 PROCEDURE — 85730 THROMBOPLASTIN TIME PARTIAL: CPT

## 2019-04-15 PROCEDURE — 85610 PROTHROMBIN TIME: CPT

## 2019-04-15 PROCEDURE — 36415 COLL VENOUS BLD VENIPUNCTURE: CPT

## 2019-04-15 PROCEDURE — 84484 ASSAY OF TROPONIN QUANT: CPT

## 2019-04-15 PROCEDURE — 85027 COMPLETE CBC AUTOMATED: CPT

## 2019-04-15 PROCEDURE — 80053 COMPREHEN METABOLIC PANEL: CPT

## 2019-04-15 PROCEDURE — 93005 ELECTROCARDIOGRAM TRACING: CPT

## 2019-04-15 PROCEDURE — 99283 EMERGENCY DEPT VISIT LOW MDM: CPT | Mod: 25

## 2019-04-24 ENCOUNTER — APPOINTMENT (OUTPATIENT)
Dept: INTERNAL MEDICINE | Facility: CLINIC | Age: 49
End: 2019-04-24

## 2019-05-06 ENCOUNTER — APPOINTMENT (OUTPATIENT)
Dept: INTERNAL MEDICINE | Facility: CLINIC | Age: 49
End: 2019-05-06

## 2019-05-09 ENCOUNTER — APPOINTMENT (OUTPATIENT)
Dept: SURGICAL ONCOLOGY | Facility: CLINIC | Age: 49
End: 2019-05-09
Payer: MEDICAID

## 2019-05-09 VITALS
HEART RATE: 75 BPM | WEIGHT: 210 LBS | RESPIRATION RATE: 15 BRPM | HEIGHT: 70 IN | SYSTOLIC BLOOD PRESSURE: 142 MMHG | DIASTOLIC BLOOD PRESSURE: 90 MMHG | BODY MASS INDEX: 30.06 KG/M2

## 2019-05-09 PROCEDURE — 99214 OFFICE O/P EST MOD 30 MIN: CPT

## 2019-05-09 NOTE — PHYSICAL EXAM
[Normal] : supple, no neck mass and thyroid not enlarged [Normal Neck Lymph Nodes] : normal neck lymph nodes  [Normal Supraclavicular Lymph Nodes] : normal supraclavicular lymph nodes [Normal Axillary Lymph Nodes] : normal axillary lymph nodes [Normal Groin Lymph Nodes] : normal groin lymph nodes [Normal] : full range of motion and no deformities appreciated

## 2019-05-17 ENCOUNTER — FORM ENCOUNTER (OUTPATIENT)
Age: 49
End: 2019-05-17

## 2019-05-18 ENCOUNTER — APPOINTMENT (OUTPATIENT)
Dept: RADIOLOGY | Facility: CLINIC | Age: 49
End: 2019-05-18
Payer: MEDICAID

## 2019-05-18 ENCOUNTER — OUTPATIENT (OUTPATIENT)
Dept: OUTPATIENT SERVICES | Facility: HOSPITAL | Age: 49
LOS: 1 days | End: 2019-05-18
Payer: MEDICAID

## 2019-05-18 ENCOUNTER — APPOINTMENT (OUTPATIENT)
Dept: ULTRASOUND IMAGING | Facility: CLINIC | Age: 49
End: 2019-05-18
Payer: MEDICAID

## 2019-05-18 DIAGNOSIS — Z98.890 OTHER SPECIFIED POSTPROCEDURAL STATES: Chronic | ICD-10-CM

## 2019-05-18 DIAGNOSIS — R22.32 LOCALIZED SWELLING, MASS AND LUMP, LEFT UPPER LIMB: ICD-10-CM

## 2019-05-18 PROCEDURE — 76882 US LMTD JT/FCL EVL NVASC XTR: CPT

## 2019-05-18 PROCEDURE — 73110 X-RAY EXAM OF WRIST: CPT

## 2019-05-18 PROCEDURE — 73110 X-RAY EXAM OF WRIST: CPT | Mod: 26,LT

## 2019-05-18 PROCEDURE — 76882 US LMTD JT/FCL EVL NVASC XTR: CPT | Mod: 26,LT

## 2019-06-01 ENCOUNTER — FORM ENCOUNTER (OUTPATIENT)
Age: 49
End: 2019-06-01

## 2019-06-02 ENCOUNTER — OUTPATIENT (OUTPATIENT)
Dept: OUTPATIENT SERVICES | Facility: HOSPITAL | Age: 49
LOS: 1 days | End: 2019-06-02
Payer: MEDICAID

## 2019-06-02 ENCOUNTER — APPOINTMENT (OUTPATIENT)
Dept: MRI IMAGING | Facility: CLINIC | Age: 49
End: 2019-06-02
Payer: MEDICAID

## 2019-06-02 DIAGNOSIS — Z98.890 OTHER SPECIFIED POSTPROCEDURAL STATES: Chronic | ICD-10-CM

## 2019-06-02 DIAGNOSIS — R22.32 LOCALIZED SWELLING, MASS AND LUMP, LEFT UPPER LIMB: ICD-10-CM

## 2019-06-02 PROCEDURE — 73223 MRI JOINT UPR EXTR W/O&W/DYE: CPT | Mod: 26,LT

## 2019-06-02 PROCEDURE — 73223 MRI JOINT UPR EXTR W/O&W/DYE: CPT

## 2019-06-02 PROCEDURE — A9585: CPT

## 2019-06-06 NOTE — HISTORY OF PRESENT ILLNESS
[de-identified] : ***REFERRED FROM [facility]\par \par 48 year-old man who Presents today with the following concerns.\par #1. A slightly tender, 1 cm, firm, smooth, relatively immobile nodule on the radial aspect of the dorsal portion of his left wrist.\par He does not recall injuring the area.\par #2. A similar sized slightly tender nodule, which is mobile and located behind his left ear.\par #3. A 2 cm, fleshy, mobile, nontender mass on the outer right shoulder.\par \par \par \par February 1, 2017 had resection of a left posterior thigh schwannoma (6 cm diameter), with Dr. Sher from neurosurgery, and reconstruction by Dr. Nguyen.\par \par His postoperative course was protracted by severe constipation from narcotic analgesics requiring readmission.\par \par ~January 2018 (cannot specify duration) he noticed a nodule on the inner aspect of his right leg. 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 Slava THOMPSON.\par He has no significant past medical history.\par He takes no regular medications

## 2019-06-06 NOTE — REASON FOR VISIT
[Other: _____] : [unfilled] [FreeTextEntry2] : today for evaluation of lumps on the dorsal left wrist, behind the left ear, and on the outer right arm

## 2019-06-06 NOTE — ASSESSMENT
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 The nodule behind the left ear clinically feels like a nonspecific retroauricular lymph node, and it too can be followed clinically.\par #3. The nodule in the dorsal aspect of his left wrist is clinically apparent, but I'm unable to characterize it further based on examination.\par Plain x-ray and sonogram are being arranged at [facility]; if inconclusive he may require an MRI...(Below)\par Reviewed in detail, all questions answered.\par \par Further management based on imaging\par \par \par 05-20-19.\par His wife and I spoke.\par Many 18, 2019, left wrist plain x-rays @[facility] demonstrate no bony abnormalities.\par Accompanying sonogram demonstrates a heterogeneous 6 x 2 x 5 mm solitary nodule medial to the radial artery, nonspecific.\par MRI recommended.\par She understands and agrees.\par Prescription entered.\par \par \par 06-06-19.\par I called his wife (Nereyda: [phone].\par We reviewed the results of his left wrist MRI performed at [facility].\par The nodule appears to be a ganglion cyst.\par I suggested an evaluation by Dr. Jensen Nguyen (hand surgery).\par She understands and agrees.\par Contacted through our internal communication system

## 2019-06-16 ENCOUNTER — LABORATORY RESULT (OUTPATIENT)
Age: 49
End: 2019-06-16

## 2019-06-17 ENCOUNTER — OUTPATIENT (OUTPATIENT)
Dept: OUTPATIENT SERVICES | Facility: HOSPITAL | Age: 49
LOS: 1 days | End: 2019-06-17
Payer: MEDICAID

## 2019-06-17 ENCOUNTER — APPOINTMENT (OUTPATIENT)
Dept: INTERNAL MEDICINE | Facility: CLINIC | Age: 49
End: 2019-06-17
Payer: MEDICAID

## 2019-06-17 VITALS
BODY MASS INDEX: 41.4 KG/M2 | OXYGEN SATURATION: 98 % | SYSTOLIC BLOOD PRESSURE: 120 MMHG | HEART RATE: 96 BPM | WEIGHT: 212 LBS | DIASTOLIC BLOOD PRESSURE: 68 MMHG

## 2019-06-17 DIAGNOSIS — I10 ESSENTIAL (PRIMARY) HYPERTENSION: ICD-10-CM

## 2019-06-17 DIAGNOSIS — Z98.890 OTHER SPECIFIED POSTPROCEDURAL STATES: Chronic | ICD-10-CM

## 2019-06-17 PROCEDURE — 80053 COMPREHEN METABOLIC PANEL: CPT

## 2019-06-17 PROCEDURE — 99396 PREV VISIT EST AGE 40-64: CPT | Mod: GC

## 2019-06-17 PROCEDURE — 80061 LIPID PANEL: CPT

## 2019-06-17 PROCEDURE — 85027 COMPLETE CBC AUTOMATED: CPT

## 2019-06-17 PROCEDURE — 83036 HEMOGLOBIN GLYCOSYLATED A1C: CPT

## 2019-06-17 PROCEDURE — G0463: CPT

## 2019-06-17 PROCEDURE — 36415 COLL VENOUS BLD VENIPUNCTURE: CPT

## 2019-06-18 LAB
ALBUMIN SERPL ELPH-MCNC: 4.7 G/DL — SIGNIFICANT CHANGE UP (ref 3.3–5)
ALP SERPL-CCNC: 57 U/L — SIGNIFICANT CHANGE UP (ref 40–120)
ALT FLD-CCNC: 31 U/L — SIGNIFICANT CHANGE UP (ref 10–45)
ANION GAP SERPL CALC-SCNC: 13 MMOL/L — SIGNIFICANT CHANGE UP (ref 5–17)
AST SERPL-CCNC: 21 U/L — SIGNIFICANT CHANGE UP (ref 10–40)
BILIRUB SERPL-MCNC: 0.4 MG/DL — SIGNIFICANT CHANGE UP (ref 0.2–1.2)
BUN SERPL-MCNC: 18 MG/DL — SIGNIFICANT CHANGE UP (ref 7–23)
CALCIUM SERPL-MCNC: 9 MG/DL — SIGNIFICANT CHANGE UP (ref 8.4–10.5)
CHLORIDE SERPL-SCNC: 106 MMOL/L — SIGNIFICANT CHANGE UP (ref 96–108)
CHOLEST SERPL-MCNC: 205 MG/DL — HIGH (ref 10–199)
CO2 SERPL-SCNC: 24 MMOL/L — SIGNIFICANT CHANGE UP (ref 22–31)
CREAT SERPL-MCNC: 0.82 MG/DL — SIGNIFICANT CHANGE UP (ref 0.5–1.3)
ESTIMATED AVERAGE GLUCOSE: 114 MG/DL — SIGNIFICANT CHANGE UP (ref 68–114)
GLUCOSE SERPL-MCNC: 106 MG/DL — HIGH (ref 70–99)
HBA1C BLD-MCNC: 5.6 % — SIGNIFICANT CHANGE UP (ref 4–5.6)
HCT VFR BLD CALC: 48.9 % — SIGNIFICANT CHANGE UP (ref 39–50)
HDLC SERPL-MCNC: 48 MG/DL — SIGNIFICANT CHANGE UP
HGB BLD-MCNC: 15.3 G/DL — SIGNIFICANT CHANGE UP (ref 13–17)
LIPID PNL WITH DIRECT LDL SERPL: 124 MG/DL — HIGH
MCHC RBC-ENTMCNC: 29.6 PG — SIGNIFICANT CHANGE UP (ref 27–34)
MCHC RBC-ENTMCNC: 31.3 GM/DL — LOW (ref 32–36)
MCV RBC AUTO: 94.6 FL — SIGNIFICANT CHANGE UP (ref 80–100)
PLATELET # BLD AUTO: 331 K/UL — SIGNIFICANT CHANGE UP (ref 150–400)
POTASSIUM SERPL-MCNC: 5.2 MMOL/L — SIGNIFICANT CHANGE UP (ref 3.5–5.3)
POTASSIUM SERPL-SCNC: 5.2 MMOL/L — SIGNIFICANT CHANGE UP (ref 3.5–5.3)
PROT SERPL-MCNC: 6.9 G/DL — SIGNIFICANT CHANGE UP (ref 6–8.3)
RBC # BLD: 5.17 M/UL — SIGNIFICANT CHANGE UP (ref 4.2–5.8)
RBC # FLD: 12.5 % — SIGNIFICANT CHANGE UP (ref 10.3–14.5)
SODIUM SERPL-SCNC: 143 MMOL/L — SIGNIFICANT CHANGE UP (ref 135–145)
TOTAL CHOLESTEROL/HDL RATIO MEASUREMENT: 4.3 RATIO — SIGNIFICANT CHANGE UP (ref 3.4–9.6)
TRIGL SERPL-MCNC: 164 MG/DL — HIGH (ref 10–149)
WBC # BLD: 5.35 K/UL — SIGNIFICANT CHANGE UP (ref 3.8–10.5)
WBC # FLD AUTO: 5.35 K/UL — SIGNIFICANT CHANGE UP (ref 3.8–10.5)

## 2019-06-18 NOTE — PHYSICAL EXAM
[No Acute Distress] : no acute distress [Well Nourished] : well nourished [Well Developed] : well developed [Well-Appearing] : well-appearing [Normal Sclera/Conjunctiva] : normal sclera/conjunctiva [Normal Voice/Communication] : normal voice/communication [PERRL] : pupils equal round and reactive to light [Normal Oropharynx] : the oropharynx was normal [Normal Outer Ear/Nose] : the outer ears and nose were normal in appearance [Normal TMs] : both tympanic membranes were normal [No JVD] : no jugular venous distention [Supple] : supple [No Lymphadenopathy] : no lymphadenopathy [No Respiratory Distress] : no respiratory distress  [Thyroid Normal, No Nodules] : the thyroid was normal and there were no nodules present [Clear to Auscultation] : lungs were clear to auscultation bilaterally [No Accessory Muscle Use] : no accessory muscle use [Normal S1, S2] : normal S1 and S2 [Regular Rhythm] : with a regular rhythm [Normal Rate] : normal rate  [No Murmur] : no murmur heard [No Carotid Bruits] : no carotid bruits [No Abdominal Bruit] : a ~M bruit was not heard ~T in the abdomen [No Edema] : there was no peripheral edema [Soft] : abdomen soft [No Extremity Clubbing/Cyanosis] : no extremity clubbing/cyanosis [No HSM] : no HSM [Non Tender] : non-tender [Non-distended] : non-distended [Normal Bowel Sounds] : normal bowel sounds [Scrotum] : the scrotum was normal [No Spinal Tenderness] : no spinal tenderness [No Joint Swelling] : no joint swelling [Testes Mass (___cm)] : there were no testicular masses [Grossly Normal Strength/Tone] : grossly normal strength/tone [Speech Grossly Normal] : speech grossly normal [Normal Mood] : the mood was normal [Memory Grossly Normal] : memory grossly normal [Normal Affect] : the affect was normal [Normal Insight/Judgement] : insight and judgment were intact [de-identified] : +1cm nodule of left wrist with no erythema, mild tenderness on palpation [FreeTextEntry1] : no inguinal hernia noted

## 2019-06-18 NOTE — ASSESSMENT
[FreeTextEntry1] : 48 M with PMH of  left thigh schwannoma and right leg dermatofibroma s/p resections presents for cpe.\par \par #left wrist lump\par -per recent MRI, likely ganglion cyst vs synovial cyst\par -f/u plastic surgery\par \par #obesity\par -BMI 40\par -pt educated on eating balanced diet and exercising regularly\par -patient declined dietician referral \par \par #HCM\par -immunization UTD\par -check cbc, cmp, A1c, lipid profile\par \par dshara Beck

## 2019-06-18 NOTE — REVIEW OF SYSTEMS
[Fever] : no fever [Chills] : no chills [Pain] : no pain [Earache] : no earache [Chest Pain] : no chest pain [Abdominal Pain] : no abdominal pain [Shortness Of Breath] : no shortness of breath [Joint Swelling] : no joint swelling [Dysuria] : no dysuria [Headache] : no headache

## 2019-06-18 NOTE — HISTORY OF PRESENT ILLNESS
[de-identified] : 48 M with PMH of  left thigh schwannoma and right leg dermatofibroma s/p resections presents for cpe. Pt reports no acute complaint today. He reports a nodule on left wrist for 2 months that is currently being evaluated by Dr. Craig (surgery). MRI of the nodule indicative of ganglion cyst vs synovial cyst.  Is awaiting plastics appointment for evaluation on 6/20/19.\par \par \par SH: No cigarette, illicit drug or EtoH use. Work 2 construction jobs currently.  with wife. Immigrated from [facility] 22 years ago. \par Diet: eats a lot of rice, pasta bread and meat, does not eat fruit and vegetable daily. Only drink soda occasionally\par Exercise: no regular exercise\par HCM: immunization UTD. Tdap received 2012.\par

## 2019-06-19 NOTE — HISTORY OF PRESENT ILLNESS
[de-identified] : multiple call attempted but unable to reach patient. Lab reviewed, cbc, cmp, a1c WNL. Lipid profile noted mildly elevated LDL but 10 years ASCVD score is 2.4. No statin indicated at this time.

## 2019-06-27 DIAGNOSIS — E66.01 MORBID (SEVERE) OBESITY DUE TO EXCESS CALORIES: ICD-10-CM

## 2019-06-27 DIAGNOSIS — R22.32 LOCALIZED SWELLING, MASS AND LUMP, LEFT UPPER LIMB: ICD-10-CM

## 2019-06-27 DIAGNOSIS — Z00.00 ENCOUNTER FOR GENERAL ADULT MEDICAL EXAMINATION WITHOUT ABNORMAL FINDINGS: ICD-10-CM

## 2019-07-10 ENCOUNTER — APPOINTMENT (OUTPATIENT)
Dept: PLASTIC SURGERY | Facility: CLINIC | Age: 49
End: 2019-07-10
Payer: MEDICAID

## 2019-07-10 VITALS
WEIGHT: 210 LBS | DIASTOLIC BLOOD PRESSURE: 78 MMHG | SYSTOLIC BLOOD PRESSURE: 124 MMHG | RESPIRATION RATE: 15 BRPM | BODY MASS INDEX: 30.06 KG/M2 | HEIGHT: 70 IN | HEART RATE: 69 BPM

## 2019-07-10 PROCEDURE — 99213 OFFICE O/P EST LOW 20 MIN: CPT

## 2019-07-17 ENCOUNTER — APPOINTMENT (OUTPATIENT)
Dept: INTERNAL MEDICINE | Facility: CLINIC | Age: 49
End: 2019-07-17

## 2019-07-17 NOTE — ADDENDUM
[FreeTextEntry1] : All medical record entries made were at my, RICKEY HEADLEY MD, direction and personally dictated by me. I have reviewed the chart and agree that the record accurately reflects my personal performance of the history, physical exam, assessment, and plan.

## 2019-07-17 NOTE — PHYSICAL EXAM
[de-identified] : The patient is ambulatory, well groomed, no signs of cachexia. [de-identified] : Normocephalic, atraumatic. [de-identified] : No conjunctival erythema, hyperemia, or discharge bilaterally; no ectropion, entropion, lagophthalmos, or lid malposition bilaterally. Pupils are equal, round, and reactive to light bilaterally. [de-identified] : There are no masses, scars, or lesions of the external ear. External nose is midline with no scars or masses. Gross hearing intact bilaterally (finger rub). Nasal mucosa has no edema, lesions, or signs of desiccation. Lips and gums have no masses, lesions, friability, or edema. [de-identified] : Neck is soft without edema, masses, or crepitus. Trachea midline. No thyromegaly; no palpable thyroid nodules. [de-identified] : No sign of respiratory distress, no tachypnea, no use of accessory respiratory muscles. [de-identified] : No swelling, tenderness, or varicosities of the extremities bilaterally; extremities are warm to palpation and pedal pulses intact. [de-identified] : No hepatomegaly or splenomegaly. No abdominal wall tenderness or masses on palpation. No abdominal wall hernias noted. [de-identified] : left dorsal wrist at the radial aspect there is a raised palpable mobile mass, approx. 1 cm in diameter,\par + trans illuminance, does not appear to be tethered to overlying skin\par  [de-identified] : left leg incisions well healed [de-identified] : CN 2-12 are intact, no sensory disturbances noted (e.g. by touch) [de-identified] : The patient is alert and oriented to time, place, and person. No obvious disorder of mood or affect such as anxiety or agitation.

## 2019-07-17 NOTE — HISTORY OF PRESENT ILLNESS
[FreeTextEntry1] : 49 yo M who presents for a follow up visit for an initial consultation regarding a mass on the left dorsal wrist - radial aspect. Present for a few months, increasing in size with associated tenderness. Pt saw Dr. Sal regarding the mass but was referred to hand surgery for further evaluation. He denies any preceding injuries, falls, or trauma to wrist/hand. Denies previous occurrences. Denies N/T of the hand and/or fingers. Otherwise no other complaints or concerns; denies fever, sweats, or chills.

## 2019-07-17 NOTE — ASSESSMENT
[FreeTextEntry1] : 49 yo M who presents for a follow up visit for an initial consultation regarding a mass on the left dorsal wrist - radial aspect. \par Various treatment options were discussed with the patient today at length. We discussed three options:\par no intervention, needle aspiration, or surgical excision of the left wrist mass, most likely a ganglion cyst. For symptomatic relief, needle aspiration and surgical excision were discussed in further detail. \par The risks and benefits of both procedures were discussed in detail. For needle aspiration, the patient was counseled that although it will bring immediate relief, there is a high possibility of recurrence. Other risk factors were discussed including but not limited to bruising, infection, and bleeding. \par Surgical excision of the left wrist mass was discussed as well as the potential risks and complications which include but are not limited to infection, bleeding, recurrence, seroma, asymmetry, deformity, scarring, paresthesia, wound dehiscence. All questions were answered, and patient would like to proceed with surgical excisional biopsy of the left wrist mass. \par The patient will be scheduled for an outpatient surgery at the earliest mutually convenient time.

## 2019-07-22 ENCOUNTER — OUTPATIENT (OUTPATIENT)
Dept: OUTPATIENT SERVICES | Facility: HOSPITAL | Age: 49
LOS: 1 days | End: 2019-07-22
Payer: MEDICAID

## 2019-07-22 VITALS
OXYGEN SATURATION: 96 % | SYSTOLIC BLOOD PRESSURE: 123 MMHG | HEART RATE: 99 BPM | WEIGHT: 214.07 LBS | RESPIRATION RATE: 14 BRPM | HEIGHT: 70.5 IN | DIASTOLIC BLOOD PRESSURE: 85 MMHG | TEMPERATURE: 98 F

## 2019-07-22 DIAGNOSIS — Z98.890 OTHER SPECIFIED POSTPROCEDURAL STATES: Chronic | ICD-10-CM

## 2019-07-22 DIAGNOSIS — R22.32 LOCALIZED SWELLING, MASS AND LUMP, LEFT UPPER LIMB: ICD-10-CM

## 2019-07-22 DIAGNOSIS — Z01.818 ENCOUNTER FOR OTHER PREPROCEDURAL EXAMINATION: ICD-10-CM

## 2019-07-22 PROCEDURE — G0463: CPT

## 2019-07-22 RX ORDER — CHLORHEXIDINE GLUCONATE 213 G/1000ML
1 SOLUTION TOPICAL DAILY
Refills: 0 | Status: DISCONTINUED | OUTPATIENT
Start: 2019-07-29 | End: 2019-08-15

## 2019-07-22 RX ORDER — SODIUM CHLORIDE 9 MG/ML
3 INJECTION INTRAMUSCULAR; INTRAVENOUS; SUBCUTANEOUS EVERY 8 HOURS
Refills: 0 | Status: DISCONTINUED | OUTPATIENT
Start: 2019-07-29 | End: 2019-08-15

## 2019-07-22 RX ORDER — LIDOCAINE HCL 20 MG/ML
0.2 VIAL (ML) INJECTION ONCE
Refills: 0 | Status: DISCONTINUED | OUTPATIENT
Start: 2019-07-29 | End: 2019-08-15

## 2019-07-22 NOTE — H&P PST ADULT - NS HIV RISK FACTOR
----- Message from Marilyn Snow sent at 3/2/2017  8:54 AM EST -----  Contact: self   Pt is weak and she has not had a bowel movement since sun             [phone]   No

## 2019-07-22 NOTE — H&P PST ADULT - VENOUS THROMBOEMBOLISM FOR WOMEN ONLY
From: Francia Hook  To: Vero Watters NP  Sent: 3/8/2018 1:57 PM CST  Subject: Bkeeding    So im still bleeding heavy and passing quarter size clots and can barely move from cramps. Should i go to urgent care or would they even do anything  
(0) indicator not present

## 2019-07-22 NOTE — H&P PST ADULT - NSICDXPASTMEDICALHX_GEN_ALL_CORE_FT
PAST MEDICAL HISTORY:  Anorectal abscess     History of Clostridium difficile colitis 2012    Mass of left thigh neuroma excised posterior thigh    Other benign neoplasm of skin of unspecified lower limb, including hip

## 2019-07-22 NOTE — H&P PST ADULT - NSICDXFAMILYHX_GEN_ALL_CORE_FT
FAMILY HISTORY:  Mother  Still living? Yes, Estimated age: 71-80  Family history of diabetes mellitus, Age at diagnosis: Age Unknown

## 2019-07-22 NOTE — H&P PST ADULT - HISTORY OF PRESENT ILLNESS
46 y/o male presents to PST.  He states a tumor was removed from his lower right leg about 2 months ago and his doctor wants to remove additional tumor from the same site. Scheduled for wide leg excision right leg tumor on 8/22/18.  Per pt, the surgery will be on his right lower leg.  Phone call placed to Dr Sal's office for clarification.        47 yo M who presents for a follow up visit for an initial consultation regarding a mass on the left dorsal wrist - radial aspect.   Various treatment options were discussed with the patient today at length. We discussed three options:  no intervention, needle aspiration, or surgical excision of the left wrist mass, most likely a ganglion cyst. For symptomatic relief, needle aspiration and surgical excision were discussed in further detail.   The risks and benefits of both procedures were discussed in detail. For needle aspiration, the patient was counseled that although it will bring immediate relief, there is a high possibility of recurrence. Other risk factors were discussed including but not limited to bruising, infection, and bleeding.   Surgical excision of the left wrist mass was discussed as well as the potential risks and complications which include but are not limited to infection, bleeding, recurrence, seroma, asymmetry, deformity, scarring, paresthesia, wound dehiscence. All questions were answered, and patient would like to proceed with surgical excisional biopsy of the left wrist mass.   The patient will be scheduled for an outpatient surgery at the earliest mutually convenient time. 49 yo Male. PMH: left thign schwannoma, s/p excision. c/o mass on the left dorsal wrist - radial aspect, associated with some discomfort. evaluated by Dr. Nguyen. now presents to PST scheduled for excisional surgery. 47 yo Male. PMH: left thigh neuroma, s/p excision. c/o mass on the left dorsal wrist - radial aspect, associated with some discomfort. evaluated by Dr. Nguyen. now presents to PST scheduled for excisional surgery.

## 2019-07-22 NOTE — H&P PST ADULT - NSICDXPASTSURGICALHX_GEN_ALL_CORE_FT
PAST SURGICAL HISTORY:  H/O excision of mass left thigh benign    History of drainage of abscess 2012 anorectal    History of excision of mass right leg 6/2018    S/P colonoscopy 2012    S/P hernia repair

## 2019-07-24 ENCOUNTER — APPOINTMENT (OUTPATIENT)
Dept: INTERNAL MEDICINE | Facility: CLINIC | Age: 49
End: 2019-07-24

## 2019-07-29 ENCOUNTER — OUTPATIENT (OUTPATIENT)
Dept: OUTPATIENT SERVICES | Facility: HOSPITAL | Age: 49
LOS: 1 days | End: 2019-07-29
Payer: MEDICAID

## 2019-07-29 ENCOUNTER — RESULT REVIEW (OUTPATIENT)
Age: 49
End: 2019-07-29

## 2019-07-29 VITALS
HEART RATE: 68 BPM | HEIGHT: 70.5 IN | OXYGEN SATURATION: 97 % | WEIGHT: 214.07 LBS | DIASTOLIC BLOOD PRESSURE: 78 MMHG | SYSTOLIC BLOOD PRESSURE: 121 MMHG | TEMPERATURE: 97 F | RESPIRATION RATE: 17 BRPM

## 2019-07-29 VITALS
RESPIRATION RATE: 18 BRPM | DIASTOLIC BLOOD PRESSURE: 58 MMHG | TEMPERATURE: 98 F | SYSTOLIC BLOOD PRESSURE: 104 MMHG | HEART RATE: 71 BPM | OXYGEN SATURATION: 100 %

## 2019-07-29 DIAGNOSIS — Z98.890 OTHER SPECIFIED POSTPROCEDURAL STATES: Chronic | ICD-10-CM

## 2019-07-29 DIAGNOSIS — R22.32 LOCALIZED SWELLING, MASS AND LUMP, LEFT UPPER LIMB: ICD-10-CM

## 2019-07-29 PROCEDURE — 88305 TISSUE EXAM BY PATHOLOGIST: CPT

## 2019-07-29 PROCEDURE — 25111 REMOVE WRIST TENDON LESION: CPT | Mod: LT

## 2019-07-29 PROCEDURE — 88305 TISSUE EXAM BY PATHOLOGIST: CPT | Mod: 26

## 2019-07-29 RX ORDER — CELECOXIB 200 MG/1
200 CAPSULE ORAL ONCE
Refills: 0 | Status: COMPLETED | OUTPATIENT
Start: 2019-07-29 | End: 2019-07-29

## 2019-07-29 RX ORDER — ONDANSETRON 8 MG/1
4 TABLET, FILM COATED ORAL ONCE
Refills: 0 | Status: DISCONTINUED | OUTPATIENT
Start: 2019-07-29 | End: 2019-08-15

## 2019-07-29 RX ORDER — SODIUM CHLORIDE 9 MG/ML
1000 INJECTION, SOLUTION INTRAVENOUS
Refills: 0 | Status: DISCONTINUED | OUTPATIENT
Start: 2019-07-29 | End: 2019-08-15

## 2019-07-29 RX ORDER — OXYCODONE HYDROCHLORIDE 5 MG/1
5 TABLET ORAL ONCE
Refills: 0 | Status: DISCONTINUED | OUTPATIENT
Start: 2019-07-29 | End: 2019-07-29

## 2019-07-29 RX ORDER — ACETAMINOPHEN 500 MG
1000 TABLET ORAL ONCE
Refills: 0 | Status: COMPLETED | OUTPATIENT
Start: 2019-07-29 | End: 2019-07-29

## 2019-07-29 RX ADMIN — CELECOXIB 200 MILLIGRAM(S): 200 CAPSULE ORAL at 12:24

## 2019-07-29 RX ADMIN — CHLORHEXIDINE GLUCONATE 1 APPLICATION(S): 213 SOLUTION TOPICAL at 12:24

## 2019-07-29 RX ADMIN — OXYCODONE HYDROCHLORIDE 5 MILLIGRAM(S): 5 TABLET ORAL at 14:35

## 2019-07-29 RX ADMIN — Medication 1000 MILLIGRAM(S): at 12:24

## 2019-07-29 RX ADMIN — CELECOXIB 200 MILLIGRAM(S): 200 CAPSULE ORAL at 14:35

## 2019-07-29 NOTE — ASU DISCHARGE PLAN (ADULT/PEDIATRIC) - ACTIVITY LEVEL
No sports/gym/Elevate extremity/No intercourse/No heavy lifting/No excercise/No weight bearing/No tub baths

## 2019-07-29 NOTE — ASU DISCHARGE PLAN (ADULT/PEDIATRIC) - CALL YOUR DOCTOR IF YOU HAVE ANY OF THE FOLLOWING:
Swelling that gets worse/Fever greater than (need to indicate Fahrenheit or Celsius)/Pain not relieved by Medications/[phone]/Wound/Surgical Site with redness, or foul smelling discharge or pus/Numbness, tingling, color or temperature change to extremity/Bleeding that does not stop

## 2019-07-29 NOTE — BRIEF OPERATIVE NOTE - OPERATION/FINDINGS
left dorsal wrist mass as well as volar aspect mass;  left forearm tourniquet was placed at 200 mm hg;    left dorsal wrist mass was excised  orthoglass was placed and splinted in dorsiflexion

## 2019-07-29 NOTE — ASU DISCHARGE PLAN (ADULT/PEDIATRIC) - ASU DC SPECIAL INSTRUCTIONSFT
1. Please follow up with Dr. Nguyen on 08/14. Please call the office for an appointment time: [phone].  2. Please keep all the dressings intact, do not remove the dressings.  3. Please keep the hand elevated to reduce swelling.  4. Please take the pain medications as directed; take motrin/advil/ibuprofen for mild pain.

## 2019-07-29 NOTE — ASU DISCHARGE PLAN (ADULT/PEDIATRIC) - NURSING INSTRUCTIONS
Next dose of Tylenol will be on or after __18.24_________ ,today/tonight, If needed for pain/cramps. Your first dose of Tylenol was given at ___12.25________. Do not exceed more than 4000mg of Tylenol in one 24 hour setting. Next dose of NSAIDS (Motrin/Alleve) will be on or after __18.24_________ today if needed for pain/cramps.

## 2019-07-29 NOTE — ASU PREOP CHECKLIST - HIBICLENS SHOWER 1 DATE
<JesseKari L - Last Filed: 03/17/18 22:57>





Treatment Plan Problems





- Problems identified on initial assessmt


  ** Hopelessness/Helplessness


Date Initiated: 03/17/18


Time Initiated: 22:58


Assessment reference: NA


Status: Active





Treatment assets and liabiliti


Patient Assests: insightful, self-reliant, ADL independent, physically healthy, 

good support system, negotiates basic needs


Patient Liabilities: relationship conflicts, other (stress)





- Milieu Protocol


Maintain good personal hygiene: daily Remind patient to perform daily oral care

, daily Assist patient to perform ADL's, every shift Encourage regular showers


Conduct patient checks and document Observation sheet: Q15 minutes


Maintain personal safety: every shift Educate patient to report safety concerns 

to staff, every shift Monitor environment for contraband/sharps


Medication safety: Monitor for expected outcome, potential side effects: every 

shift, Assess barriers to learning: every shift, Assess readiness for 

medication education: every shift


Milieu Narrative: 





Plan discussed with pt to start zoloft 25 mg daily for depression and will 

engage pt in therapy and groups.


will monitor for suicidal thoughts.





Discharge/Continuing Care





- Treatment Team Participation


Patient/Family/SO Statement: 





Plan discussed with pt to start zoloft 25 mg daily for depression and will 

engage pt in therapy and groups.


will monitor for suicidal thoughts.





<Tony Batista - Last Filed: 03/18/18 13:23>





Family Contact


Family involvement: Family/SO is involved


Family contact: Patient agrees to contact, Family has been contacted by patient

, Telephone contact initiated by staff


Family contact name: Tony Novak), mother - [phone]


Family contacted how many times per week?: 3


Family contact comment: Writer met with pt's mother, Lexii, to discuss 

precursers to hospitalization and concerns pt's mother might have. Pt's mother 

feels that pt lacks appropriate coping skills and often stresses herself out by 

putting too much pressure on herself. Pt's mother expressed that her and the pt 

have a contentious relationship. Pt's mother is not concerned that pt is a harm 

risk to herself.





- Goals for Treatment


Patient goals for treatment: Pt reported that she wouls like to be connected to 

therapy to help her learn better coping skils and to help her process the past 

pain and trauma in her life.


Patient's family/SO goals for treatment: Pt's mother would like pt to be 

connected to outpatient therapy so she can learn better coping skills.





Discharge/Continuing Care





- Education Needs


Education Needs: Family Medication, Family Diagnosis/Disease Process, Family 

Coping Skills, Family Placement options, Family Aftercare Safety Plan, Patient 

Medication, Patient Diagnosis/Disease Process, Patient Coping Skills, Patient 

Placement options, Patient Aftercare Safety Plan





- Discharge


Discharge Criteria: Tolerates medication w/o severe side effects, Free of 

Suicidal thoughts, Free of agitation, Normal sleep pattern, Reduction of target 

symptoms


Discharge to:: Home, With Family





- Treatment Team Participation


Discussed with Family/SO: Yes





<Leatha Em - Last Filed: 03/19/18 12:32>





- Diagnosis


(1) Depression


Status: Acute   


Interventions: 


PSYCHOTHERAPY


03/19/18 12:32











<Sandy Cox - Last Filed: 03/19/18 14:24>





Discharge/Continuing Care





- Treatment Team Participation


Patient/Family/SO Statement: 





03/19/18 14:24


Patient attended tx team this morning and was able to engage in discussion 

regarding progress on 3NP and discharge planning. Pt. reports improvement in 

sxs of depression and anxiety since admission. Pt. reported difficulties 

remembering events leading to transfer from medical to [facility]. Pt. denies overdose 

leading to admission as a suicide attempts. Pt. presents with limited insight 

and poor impulse control and appears to be minimizing precursors to admission 

.Supportive therapy regarding benefits of medication management and 

psychoeducation regarding severity of precursors to hospitalization provided. 

Pt. goal oriented, expressing wanting to exercise and engage in outpatient 

therapy before trying medication management. Pt. agreeable to OPS with [facility]. Pt. anticipated for d/c on 3/19.
29-Jul-2019

## 2019-07-31 ENCOUNTER — OUTPATIENT (OUTPATIENT)
Dept: OUTPATIENT SERVICES | Facility: HOSPITAL | Age: 49
LOS: 1 days | End: 2019-07-31
Payer: MEDICAID

## 2019-07-31 ENCOUNTER — APPOINTMENT (OUTPATIENT)
Dept: INTERNAL MEDICINE | Facility: CLINIC | Age: 49
End: 2019-07-31
Payer: MEDICAID

## 2019-07-31 DIAGNOSIS — Z98.890 OTHER SPECIFIED POSTPROCEDURAL STATES: Chronic | ICD-10-CM

## 2019-07-31 DIAGNOSIS — T14.8XXA OTHER INJURY OF UNSPECIFIED BODY REGION, INITIAL ENCOUNTER: ICD-10-CM

## 2019-07-31 DIAGNOSIS — Z78.9 OTHER SPECIFIED HEALTH STATUS: ICD-10-CM

## 2019-07-31 DIAGNOSIS — E66.01 MORBID (SEVERE) OBESITY DUE TO EXCESS CALORIES: ICD-10-CM

## 2019-07-31 DIAGNOSIS — I10 ESSENTIAL (PRIMARY) HYPERTENSION: ICD-10-CM

## 2019-07-31 PROCEDURE — G0463: CPT

## 2019-07-31 PROCEDURE — 99213 OFFICE O/P EST LOW 20 MIN: CPT | Mod: GE

## 2019-08-01 PROBLEM — T14.8XXA MUSCLE STRAIN: Status: ACTIVE | Noted: 2019-08-01

## 2019-08-01 PROBLEM — E66.01 OBESITY, CLASS III, BMI 40-49.9 (MORBID OBESITY): Status: ACTIVE | Noted: 2019-06-18

## 2019-08-01 NOTE — PHYSICAL EXAM
[No Acute Distress] : no acute distress [Well Developed] : well developed [Well Nourished] : well nourished [Normal Sclera/Conjunctiva] : normal sclera/conjunctiva [Normal Outer Ear/Nose] : the outer ears and nose were normal in appearance [EOMI] : extraocular movements intact [Normal Oropharynx] : the oropharynx was normal [No JVD] : no jugular venous distention [Supple] : supple [No Respiratory Distress] : no respiratory distress  [Clear to Auscultation] : lungs were clear to auscultation bilaterally [No Accessory Muscle Use] : no accessory muscle use [Normal Rate] : normal rate  [Regular Rhythm] : with a regular rhythm [Normal S1, S2] : normal S1 and S2 [No Murmur] : no murmur heard [No Varicosities] : no varicosities [Pedal Pulses Present] : the pedal pulses are present [No Edema] : there was no peripheral edema [No Extremity Clubbing/Cyanosis] : no extremity clubbing/cyanosis [Soft] : abdomen soft [Non Tender] : non-tender [Non-distended] : non-distended [Normal Bowel Sounds] : normal bowel sounds [Testes Tenderness] : no tenderness of the testes [Testes Mass (___cm)] : there were no testicular masses [Grossly Normal Strength/Tone] : grossly normal strength/tone [de-identified] : Pain w/ deep palpation in R inguinal crease along psoas muscle. No buldge or masses noted

## 2019-08-01 NOTE — REVIEW OF SYSTEMS
[Muscle Pain] : muscle pain [Fever] : no fever [Chills] : no chills [Fatigue] : no fatigue [Discharge] : no discharge [Pain] : no pain [Chest Pain] : no chest pain [Palpitations] : no palpitations [Claudication] : no  leg claudication [Shortness Of Breath] : no shortness of breath [Wheezing] : no wheezing [Cough] : no cough [Abdominal Pain] : no abdominal pain [Nausea] : no nausea [Constipation] : no constipation [Diarrhea] : no diarrhea [Vomiting] : no vomiting [Dysuria] : no dysuria [Incontinence] : no incontinence [Hematuria] : no hematuria [Joint Pain] : no joint pain [Joint Stiffness] : no joint stiffness [Itching] : no itching [Mole Changes] : no mole changes [Skin Rash] : no skin rash [Headache] : no headache [Dizziness] : no dizziness [Unsteady Walk] : no ataxia

## 2019-08-01 NOTE — ASSESSMENT
[FreeTextEntry1] : # Melanoma \par -Per patient and pathology reports review, no record of melanoma noted, but is on problem list\par -Pt will address w/ oncologist at next appointment to further clarify/ or remove problem from problem list

## 2019-08-06 DIAGNOSIS — E66.01 MORBID (SEVERE) OBESITY DUE TO EXCESS CALORIES: ICD-10-CM

## 2019-08-06 DIAGNOSIS — T14.8XXA OTHER INJURY OF UNSPECIFIED BODY REGION, INITIAL ENCOUNTER: ICD-10-CM

## 2019-08-14 ENCOUNTER — APPOINTMENT (OUTPATIENT)
Dept: PLASTIC SURGERY | Facility: CLINIC | Age: 49
End: 2019-08-14
Payer: MEDICAID

## 2019-08-14 PROCEDURE — 99024 POSTOP FOLLOW-UP VISIT: CPT

## 2019-08-15 NOTE — ED ADULT NURSE NOTE - PAIN RATING/NUMBER SCALE (0-10): REST
Patient Discharge Instructions    Gladys Stone / [id number] : 1959    Admitted 2019 Discharged: 8/15/2019     Take Home Medications       · It is important that you take the medication exactly as they are prescribed. · Keep your medication in the bottles provided by the pharmacist and keep a list of the medication names, dosages, and times to be taken in your wallet. · Do not take other medications without consulting your doctor. · Do not drive, drink alcohol, or operate machinery when taking sedating pain medication. · Take colace daily while on pain medication to help prevent constipation. You may take over the counter laxatives such as Dulcolax or Miralax as needed for constipation      What to do at Home    Recommended diet: Regular Diet    Recommended activity: No heavy lifting or strenuous activity for 2 weeks. You may shower. You may drive once pain has improved and you no longer require pain medication. Follow up with Dr. Yunior Roldan in 1-2 weeks. Call [facility] to make an appointment. Call Dr. Yunior Roldan or go to the ER if you develop worsening pain, fever, vomiting, or any other symptoms that concern you.
6

## 2019-08-15 NOTE — PHYSICAL EXAM
[de-identified] : Left Wrist -\par dressings/splint removed,\par Incisions are CDI, no erythema, no gaps, no drainage noted. No sign of active infection.\par

## 2019-08-15 NOTE — ASSESSMENT
[FreeTextEntry1] : 47 yo M s/p excisional biopsy of volar and dorsal left wrist masses DOS: 07/29/19. \par \par - All dressings were removed\par - Steri strips replaced\par - The patient was encouraged to massage the incision site 2-3 times per day for 8-10 minutes with lotion, vitamin E, or cocoa butter to encourage blood flow and to decrease scar tissue formation.\par - Follow up PRN

## 2019-08-15 NOTE — HISTORY OF PRESENT ILLNESS
[FreeTextEntry1] : 47 yo M s/p excisional biopsy of volar and dorsal left wrist masses DOS: 07/29/19. Doing better with time, reports some soreness. Dressings are all intact, kept it dry. Otherwise no other complaints or concerns; denies fever, sweats, or chills.

## 2019-09-11 ENCOUNTER — APPOINTMENT (OUTPATIENT)
Dept: PLASTIC SURGERY | Facility: CLINIC | Age: 49
End: 2019-09-11
Payer: MEDICAID

## 2019-09-11 PROCEDURE — 99024 POSTOP FOLLOW-UP VISIT: CPT

## 2019-09-11 NOTE — HISTORY OF PRESENT ILLNESS
[FreeTextEntry1] : 48 year old male who presents for post op visit s/p excisional biopsy of volar and dorsal left wrist masses DOS: 07/29/19. Pt c/o soreness and tenderness to touch. He has not been massaging area due to pain. Otherwise no other complaints or concerns; denies fever, sweats, or chills.\par \par

## 2019-09-11 NOTE — PHYSICAL EXAM
[de-identified] : Left Wrist -\par Incisions are well healed, no fluctuance, no open areas, from of the wrist, tender to palpation,\par no erythema, no masses

## 2019-09-11 NOTE — ASSESSMENT
[FreeTextEntry1] : 48 year old male who presents for post op visit s/p excisional biopsy of volar and dorsal left wrist masses DOS: 07/29/19.\par \par Incisions are well healed, no evidence of fluctuance or infection\par - Start hand therapy HEP and edema control\par - The patient was encouraged to massage the incision site 2-3 times per day for 8-10 minutes with lotion, vitamin E, or cocoa butter to encourage blood flow and to decrease scar tissue formation.\par - F/u 3-4 weeks

## 2019-09-21 NOTE — ED PROVIDER NOTE - ATTESTATION, MLM
done I have reviewed and confirmed nurses' notes for patient's medications, allergies, medical history, and surgical history.

## 2019-09-26 ENCOUNTER — APPOINTMENT (OUTPATIENT)
Dept: PLASTIC SURGERY | Facility: CLINIC | Age: 49
End: 2019-09-26

## 2019-10-02 ENCOUNTER — APPOINTMENT (OUTPATIENT)
Dept: PLASTIC SURGERY | Facility: CLINIC | Age: 49
End: 2019-10-02

## 2019-10-07 NOTE — H&P PST ADULT - TEACHING/LEARNING DEVELOPMENTAL CONSIDERATIONS
Report received from off-going nurse,Brett Florez RN  visual identification made, assumed care of pt. Pt resting quietly with eyes closed, no agitation or restlessness, no grimacing or groaning. Pt respirations unlabored. oximizer in place Tab alert in place, rails up x 2, bed in lowest position, safety maintained. FLACC 0.  0800 pt resting quietly. Spoke with NP [facility] about discharge orders. 6956 pt awakened with verbal stimuli. Pt hands shaky but attempting to take medication without assistance. Pt alert to self, discussed with him that he would be discharged home today. Attempted to call pt sister but kept getting a no longer in service message, attempts made on both home and cell number. 0930 called Kim Clements RN to see if she had a different phone number for pt sister, Ethan Castillo. Phone numbers the same. Pt resting quietly   [address], , was able to contact Ethan Castillo. Brief report given to Ethan Castillo. Obtained information on local pharmacy for NP [facility]. 1030 EMS arrived to transport pt.   [phone] called Fabiana Dawson RN with updated report. Called Ethan Castillo to inform her that pt was being transported at this time. none

## 2019-11-07 NOTE — ED ADULT NURSE NOTE - CINV DISCH MEDS REVIEWED YN
Quality 431: Preventive Care And Screening: Unhealthy Alcohol Use - Screening: Patient screened for unhealthy alcohol use using a single question and scores less than 2 times per year Quality 111:Pneumonia Vaccination Status For Older Adults: Pneumococcal Vaccination Previously Received Quality 110: Preventive Care And Screening: Influenza Immunization: Influenza Immunization Administered during Influenza season Detail Level: Detailed Quality 226: Preventive Care And Screening: Tobacco Use: Screening And Cessation Intervention: Patient screened for tobacco use and is an ex/non-smoker Yes

## 2019-11-07 NOTE — PATIENT PROFILE ADULT. - NS PRO MODE OF ARRIVAL
Changed abx to doxycycline. If not significantly better in 72 hours, then needs appt.     Please let them know.     Thank you,  Dean Prasad, DO     stretcher

## 2019-12-02 ENCOUNTER — EMERGENCY (EMERGENCY)
Facility: HOSPITAL | Age: 49
LOS: 1 days | Discharge: HOME | End: 2019-12-02
Attending: EMERGENCY MEDICINE
Payer: MEDICAID

## 2019-12-02 VITALS
WEIGHT: 214.95 LBS | HEART RATE: 81 BPM | HEIGHT: 70 IN | RESPIRATION RATE: 16 BRPM | TEMPERATURE: 99 F | DIASTOLIC BLOOD PRESSURE: 87 MMHG | SYSTOLIC BLOOD PRESSURE: 145 MMHG | OXYGEN SATURATION: 97 %

## 2019-12-02 VITALS
TEMPERATURE: 98 F | OXYGEN SATURATION: 97 % | HEART RATE: 72 BPM | DIASTOLIC BLOOD PRESSURE: 80 MMHG | SYSTOLIC BLOOD PRESSURE: 130 MMHG | RESPIRATION RATE: 16 BRPM

## 2019-12-02 DIAGNOSIS — Z98.890 OTHER SPECIFIED POSTPROCEDURAL STATES: Chronic | ICD-10-CM

## 2019-12-02 LAB
ALBUMIN SERPL ELPH-MCNC: 4.6 G/DL — SIGNIFICANT CHANGE UP (ref 3.3–5)
ALP SERPL-CCNC: 59 U/L — SIGNIFICANT CHANGE UP (ref 40–120)
ALT FLD-CCNC: 47 U/L — HIGH (ref 10–45)
ANION GAP SERPL CALC-SCNC: 13 MMOL/L — SIGNIFICANT CHANGE UP (ref 5–17)
APTT BLD: 29.2 SEC — SIGNIFICANT CHANGE UP (ref 27.5–36.3)
AST SERPL-CCNC: 32 U/L — SIGNIFICANT CHANGE UP (ref 10–40)
BASOPHILS # BLD AUTO: 0.02 K/UL — SIGNIFICANT CHANGE UP (ref 0–0.2)
BASOPHILS NFR BLD AUTO: 0.3 % — SIGNIFICANT CHANGE UP (ref 0–2)
BILIRUB SERPL-MCNC: 0.3 MG/DL — SIGNIFICANT CHANGE UP (ref 0.2–1.2)
BUN SERPL-MCNC: 16 MG/DL — SIGNIFICANT CHANGE UP (ref 7–23)
CALCIUM SERPL-MCNC: 9.5 MG/DL — SIGNIFICANT CHANGE UP (ref 8.4–10.5)
CHLORIDE SERPL-SCNC: 103 MMOL/L — SIGNIFICANT CHANGE UP (ref 96–108)
CO2 SERPL-SCNC: 23 MMOL/L — SIGNIFICANT CHANGE UP (ref 22–31)
CREAT SERPL-MCNC: 0.8 MG/DL — SIGNIFICANT CHANGE UP (ref 0.5–1.3)
EOSINOPHIL # BLD AUTO: 0.12 K/UL — SIGNIFICANT CHANGE UP (ref 0–0.5)
EOSINOPHIL NFR BLD AUTO: 1.8 % — SIGNIFICANT CHANGE UP (ref 0–6)
GAS PNL BLDV: SIGNIFICANT CHANGE UP
GLUCOSE SERPL-MCNC: 96 MG/DL — SIGNIFICANT CHANGE UP (ref 70–99)
HCT VFR BLD CALC: 42.9 % — SIGNIFICANT CHANGE UP (ref 39–50)
HGB BLD-MCNC: 14.4 G/DL — SIGNIFICANT CHANGE UP (ref 13–17)
IMM GRANULOCYTES NFR BLD AUTO: 0.3 % — SIGNIFICANT CHANGE UP (ref 0–1.5)
INR BLD: 1 RATIO — SIGNIFICANT CHANGE UP (ref 0.88–1.16)
LIDOCAIN IGE QN: 21 U/L — SIGNIFICANT CHANGE UP (ref 7–60)
LYMPHOCYTES # BLD AUTO: 2.53 K/UL — SIGNIFICANT CHANGE UP (ref 1–3.3)
LYMPHOCYTES # BLD AUTO: 37.9 % — SIGNIFICANT CHANGE UP (ref 13–44)
MCHC RBC-ENTMCNC: 29.5 PG — SIGNIFICANT CHANGE UP (ref 27–34)
MCHC RBC-ENTMCNC: 33.6 GM/DL — SIGNIFICANT CHANGE UP (ref 32–36)
MCV RBC AUTO: 87.9 FL — SIGNIFICANT CHANGE UP (ref 80–100)
MONOCYTES # BLD AUTO: 0.54 K/UL — SIGNIFICANT CHANGE UP (ref 0–0.9)
MONOCYTES NFR BLD AUTO: 8.1 % — SIGNIFICANT CHANGE UP (ref 2–14)
NEUTROPHILS # BLD AUTO: 3.45 K/UL — SIGNIFICANT CHANGE UP (ref 1.8–7.4)
NEUTROPHILS NFR BLD AUTO: 51.6 % — SIGNIFICANT CHANGE UP (ref 43–77)
NRBC # BLD: 0 /100 WBCS — SIGNIFICANT CHANGE UP (ref 0–0)
PLATELET # BLD AUTO: 301 K/UL — SIGNIFICANT CHANGE UP (ref 150–400)
POTASSIUM SERPL-MCNC: 4.8 MMOL/L — SIGNIFICANT CHANGE UP (ref 3.5–5.3)
POTASSIUM SERPL-SCNC: 4.8 MMOL/L — SIGNIFICANT CHANGE UP (ref 3.5–5.3)
PROT SERPL-MCNC: 7.4 G/DL — SIGNIFICANT CHANGE UP (ref 6–8.3)
PROTHROM AB SERPL-ACNC: 11.5 SEC — SIGNIFICANT CHANGE UP (ref 10–12.9)
RBC # BLD: 4.88 M/UL — SIGNIFICANT CHANGE UP (ref 4.2–5.8)
RBC # FLD: 11.9 % — SIGNIFICANT CHANGE UP (ref 10.3–14.5)
SODIUM SERPL-SCNC: 139 MMOL/L — SIGNIFICANT CHANGE UP (ref 135–145)
WBC # BLD: 6.68 K/UL — SIGNIFICANT CHANGE UP (ref 3.8–10.5)
WBC # FLD AUTO: 6.68 K/UL — SIGNIFICANT CHANGE UP (ref 3.8–10.5)

## 2019-12-02 PROCEDURE — 96374 THER/PROPH/DIAG INJ IV PUSH: CPT | Mod: XU

## 2019-12-02 PROCEDURE — 82947 ASSAY GLUCOSE BLOOD QUANT: CPT

## 2019-12-02 PROCEDURE — 99284 EMERGENCY DEPT VISIT MOD MDM: CPT

## 2019-12-02 PROCEDURE — 82803 BLOOD GASES ANY COMBINATION: CPT

## 2019-12-02 PROCEDURE — 85610 PROTHROMBIN TIME: CPT

## 2019-12-02 PROCEDURE — 80053 COMPREHEN METABOLIC PANEL: CPT

## 2019-12-02 PROCEDURE — 82330 ASSAY OF CALCIUM: CPT

## 2019-12-02 PROCEDURE — 99284 EMERGENCY DEPT VISIT MOD MDM: CPT | Mod: 25

## 2019-12-02 PROCEDURE — 74177 CT ABD & PELVIS W/CONTRAST: CPT

## 2019-12-02 PROCEDURE — 85027 COMPLETE CBC AUTOMATED: CPT

## 2019-12-02 PROCEDURE — 84295 ASSAY OF SERUM SODIUM: CPT

## 2019-12-02 PROCEDURE — 83690 ASSAY OF LIPASE: CPT

## 2019-12-02 PROCEDURE — 82435 ASSAY OF BLOOD CHLORIDE: CPT

## 2019-12-02 PROCEDURE — 85014 HEMATOCRIT: CPT

## 2019-12-02 PROCEDURE — 74177 CT ABD & PELVIS W/CONTRAST: CPT | Mod: 26

## 2019-12-02 PROCEDURE — 85730 THROMBOPLASTIN TIME PARTIAL: CPT

## 2019-12-02 PROCEDURE — 83605 ASSAY OF LACTIC ACID: CPT

## 2019-12-02 PROCEDURE — 84132 ASSAY OF SERUM POTASSIUM: CPT

## 2019-12-02 RX ORDER — KETOROLAC TROMETHAMINE 30 MG/ML
15 SYRINGE (ML) INJECTION ONCE
Refills: 0 | Status: DISCONTINUED | OUTPATIENT
Start: 2019-12-02 | End: 2019-12-02

## 2019-12-02 RX ORDER — ACETAMINOPHEN 500 MG
650 TABLET ORAL ONCE
Refills: 0 | Status: COMPLETED | OUTPATIENT
Start: 2019-12-02 | End: 2019-12-02

## 2019-12-02 RX ADMIN — Medication 650 MILLIGRAM(S): at 16:40

## 2019-12-02 RX ADMIN — Medication 15 MILLIGRAM(S): at 16:40

## 2019-12-02 NOTE — ED ADULT NURSE NOTE - OBJECTIVE STATEMENT
49 y/o M PMHx of C-diff and anorectal abcess coming to the ED c/o of rectal pain. Pt states that 4 days ago while having a BM patient began to feel an intense 10/10 burning sensation. Pt states at first the burning sensation only occurred during DM then it began to become a constant pain. Pt states that he also began to have lower back pain a/w the burning in his rectum. Pt also has intermittent upper quadrant abdominal cramping. Pt states that on one of the days he had a dime size amount of BRB in his stool but had no other episodes. Pt denies any hematuria or dysuria. Pt denies any change in bowel movements. Pt denies any CP/SOB/Fever/Chills/N/V/D. Pt states that he has a hx of hemorrhoids and that 8 years ago he had an anorectal abcess that was removed and it feels almost the same. On exam, abdomen soft, non-tender, non-distended.

## 2019-12-02 NOTE — ED PROVIDER NOTE - OBJECTIVE STATEMENT
48M hx c diff c/b rectal abscess, hemorrhoids presents with rectal pain x couple days. +mild abd pain and Minimal bloody specks on stool but no mark brbpr. Denies hard stools. Patient denies chest pain, SOB, f/c, cough, N/V/D/C, weakness, HA, dizziness, urinary symptoms, extremity pain or swelling or other complaints.

## 2019-12-02 NOTE — ED PROVIDER NOTE - NSFOLLOWUPCLINICS_GEN_ALL_ED_FT
[facility]enter[facility]  Gastroenterology  [address] [place] [id number]  Phone: [phone]  Fax:   Follow Up Time:

## 2019-12-02 NOTE — ED PROVIDER NOTE - NS ED ROS FT
Constitutional: no fevers, no chills.  Eyes: no visual changes.  Ears: no ear drainage, no ear pain.  Nose: no nasal congestion.  Mouth/Throat: no sore throat.  Cardiovascular: no chest pain.  Respiratory: no shortness of breath, no wheezing, no cough  Gastrointestinal: no nausea, no vomiting, no diarrhea, +abdominal pain. +rectal pain  MSK: no flank pain, no back pain.  Genitourinary: no dysuria, no hematuria.  Skin: no rashes.  Neuro: no headache,   Psychiatric: no known mental health issues.

## 2019-12-02 NOTE — ED PROVIDER NOTE - NSFOLLOWUPINSTRUCTIONS_ED_ALL_ED_FT
1) You were seen in the ER for rectal pain. The patient has been informed of all concerning signs and symptoms to return to Emergency Department, the necessity to follow up with PMD/Clinic/follow up provided within 2-3 days was explained, and the patient reports understanding of above with capacity and insight. You can look at the discharge papers for some examples of specific signs and symptoms to look out for.  2) Please follow up with Gastreoenterology  3) Please take Tylenol 650mg every 4-6 hours as needed for pain.  4) Please return to ED for any new or worsening symptoms.

## 2019-12-02 NOTE — ED ADULT NURSE NOTE - NSIMPLEMENTINTERV_GEN_ALL_ED
Implemented All Universal Safety Interventions:  [place] to call system. Call bell, personal items and telephone within reach. Instruct patient to call for assistance. Room bathroom lighting operational. Non-slip footwear when patient is off stretcher. Physically safe environment: no spills, clutter or unnecessary equipment. Stretcher in lowest position, wheels locked, appropriate side rails in place.

## 2019-12-02 NOTE — ED PROVIDER NOTE - ATTENDING CONTRIBUTION TO CARE
49yo male pmh C diff c/b rectal abscess requiring surgical drainage p/w rectal pain with defecation x several days, pain lasts several hours. Denies melena, BRBPR, diarrhea, n/v, abd pain, chest pain, dyspnea, cough, fevers, dysuria. Rectal exam without fluctuant areas, no hemorrhoids, no anal fissures. Abd soft, non tender. CT a/p, labs, pain control PRN.

## 2019-12-02 NOTE — ED PROVIDER NOTE - PHYSICAL EXAMINATION
GEN: Well appearing, well nourished, in no apparent distress.  HEAD: NCAT  HEENT: PERRL, Airway patent, EOMI, non-erythematous pharynx, no exudates, uvula midline, MMM, neck supple, no LAD, no JVD  LUNG: CTAB, no adventitious sounds, no retractions, no nasal flaring  CV: RRR, no murmurs,   Abd: soft, NTND, no rebound or guarding, BS+ in all quadrants, no CVAT  (chaperone Dr Adair): no fluctuance inside rectum, no external hemorrhoids, no internal hemorrhoids palpated, no mark blood  MSK: WWP, Pulses 2+ in extremities, No edema   Neuro:  AAOx3, Ambulatory with stable gait.  Skin: Warm and dry, no evidence of rash  Psych: normal mood and affect

## 2019-12-02 NOTE — ED PROVIDER NOTE - CLINICAL SUMMARY MEDICAL DECISION MAKING FREE TEXT BOX
48M hx c diff c/b rectal abscess, hemorrhoids presents with rectal pain x couple days. CT abd to eval for redevelopment of rectal abscess vs fisutla, unlikely fissure as pt without much pain during rectal exam. labs including lipase and lactate

## 2019-12-02 NOTE — ED PROVIDER NOTE - PATIENT PORTAL LINK FT
You can access the FollowMyHealth Patient Portal offered by [facility] by registering at the following website: [email or web address]. By joining [facility]’s FollowMyHealth portal, you will also be able to view your health information using other applications (apps) compatible with our system.

## 2020-01-10 ENCOUNTER — APPOINTMENT (OUTPATIENT)
Dept: INTERNAL MEDICINE | Facility: CLINIC | Age: 50
End: 2020-01-10

## 2020-02-06 NOTE — ASU PREOP CHECKLIST - TO WHOM
Cough, fever, runny nose    Started 5 nights ago with red eyes and runny nose, lots of clear drainage. 4 days ago developed fever, very hot to touch. Vomited that night. Cough has been getting worse all week, wet and loose. Post tussive emesis last night. Last weekend with other children who had symptoms, later diagnosed with influenza    Chart Review:  11/8/19 omnicef for urinary symptoms, not infection    GENERAL:  somewhat 'droopy' looking, but alert and not toxic  HYDRATION:  well hydrated: moist mucous membranes, good tear production & skin turgor, eyes not sunken  EARS:  Left TM red and full and Right TM normal  NOSE:  positive findings: nasal congestion and mucosa erythematous and swollen  THROAT:  no significant tonsillar hypertrophy or inflam., or oropharyngeal lesions and moderate erythema  NECK:  supple and no cervical or supraclavicular lymphadenopathy  LUNGS:  Chest totally clear; no rales, rhonchi, wheezes or stridor, Excellent air exchange with normal I/E; and no tachyp or retractions    A/P:  See Diagnosis, Orders/Medication, and Follow-up instructions.   Flu like illness  LOM OR Nurse OR Nurse - report received from Denys Hernadez RN

## 2020-02-12 ENCOUNTER — APPOINTMENT (OUTPATIENT)
Dept: INTERNAL MEDICINE | Facility: CLINIC | Age: 50
End: 2020-02-12

## 2020-02-27 NOTE — ED ADULT TRIAGE NOTE - SOURCE OF INFORMATION
ONE TOUCH IS NO LONGER COVERED.  NEED RX FOR NEW METER/STRIPS/LANCETS.  ACCU-CHEK OR CONTOUR PREFERRED.    [facility]  Florian/[place]al     EMS

## 2020-05-20 NOTE — ED ADULT NURSE NOTE - CHPI ED NUR SYMPTOMS NEG
Name band;
no vomiting/no weakness/no deformity/no tingling/no numbness/no loss of consciousness/no bleeding/no confusion

## 2020-05-22 ENCOUNTER — APPOINTMENT (OUTPATIENT)
Dept: PLASTIC SURGERY | Facility: CLINIC | Age: 50
End: 2020-05-22
Payer: MEDICAID

## 2020-05-22 PROCEDURE — 99202 OFFICE O/P NEW SF 15 MIN: CPT | Mod: 95

## 2020-05-22 NOTE — ASSESSMENT
[FreeTextEntry1] : 49  year old male s/p excisional biopsy of volar and dorsal left wrist masses DOS: 07/29/19, consistent with ganglion cyst, presents with swelling on volar aspect of same wrist present for 2-3 weeks now. Patient with no complaints.\par \par -Will assess in the office for possible aspiration once restrictions from Pandemic are lifted.\par -Instructed to continue observing.\par -Will schedule for an office appointment.

## 2020-05-22 NOTE — HISTORY OF PRESENT ILLNESS
[Other Location: e.g. School (Enter Location, City,State)___] : at [unfilled], at the time of the visit. [Verbal consent obtained from patient] : the patient, [unfilled] [Medical Office: ([facility])___] : at the medical office located in  [FreeTextEntry1] : 49 year old male s/p excisional biopsy of volar and dorsal left wrist masses DOS: 07/29/19. Pt c/o bump on the volar aspect of the wrist. Patient noted it about 2-3 weeks ago. Denies pain. Denies trauma to area. Denies fall. Reports dorsal aspect is ok. Denies numbness. Otherwise no other complaints or concerns; denies fever, sweats, or chills.

## 2020-05-22 NOTE — PHYSICAL EXAM
[de-identified] : Left wrist - fullness/mass like noted on the volar aspect of wrist, dorsal aspect incision well healed, no mass\par limited exam otherwise

## 2020-05-27 ENCOUNTER — APPOINTMENT (OUTPATIENT)
Dept: INTERNAL MEDICINE | Facility: CLINIC | Age: 50
End: 2020-05-27

## 2020-05-27 ENCOUNTER — OUTPATIENT (OUTPATIENT)
Dept: OUTPATIENT SERVICES | Facility: HOSPITAL | Age: 50
LOS: 1 days | End: 2020-05-27
Payer: MEDICAID

## 2020-05-27 DIAGNOSIS — R20.2 PARESTHESIA OF SKIN: ICD-10-CM

## 2020-05-27 DIAGNOSIS — Z98.890 OTHER SPECIFIED POSTPROCEDURAL STATES: Chronic | ICD-10-CM

## 2020-05-27 DIAGNOSIS — R53.82 CHRONIC FATIGUE, UNSPECIFIED: ICD-10-CM

## 2020-05-27 DIAGNOSIS — I10 ESSENTIAL (PRIMARY) HYPERTENSION: ICD-10-CM

## 2020-05-27 PROCEDURE — G0463: CPT

## 2020-05-28 PROBLEM — R20.2 PARESTHESIA: Status: ACTIVE | Noted: 2020-05-28

## 2020-05-29 NOTE — HISTORY OF PRESENT ILLNESS
[Other Location: e.g. School (Enter Location, City,State)___] : at [unfilled], at the time of the visit. [Other Location: e.g. Home (Enter Location, City,State)___] : at [unfilled] [Verbal consent obtained from patient] : the patient, [unfilled] [FreeTextEntry8] : Mr. Bradford is a 49 year old gentleman with a history of neurofibromas also s/p schwannoma resection, recent ganglion cyst removal of wrist, otherwise healthy not taking any medications, whom I spoke to today over telephone encounter regarding a history of waxing/waning fatigue for the past several months associated with muscle achiness and crampiness in the thighs and back, and occasionally arms. He is a  and does very little strenuous activity at work, mostly desk work. He noted that some days he will have extreme fatigue and when he gets home, he just wants to rest. He gave the example that the day before this appointment, when he got him, he went to lift his youngest son, and found he was not able to due to extreme tiredness. No focal weakness, no loss of sensation or blurry vision. No headaches apart from the usual. Takes Tylenol as needed for crampiness and it gives him some relief. Today the fatigue and symptoms are better than they were yesterday. Denies weight loss, fever, chills, confusion, or dizziness. No problems with thyroid or diabetes. He acknowledges being overweight and admits this may be contributory. Urinating and defecating normally, no dark stools. No drinking, never smoked or used any other drugs.

## 2020-05-29 NOTE — ASSESSMENT
[FreeTextEntry1] : 49M with h/o neurofibromas p/w periodic waxing/waning chronic fatigue and paresthesias. Etiology likely multifactorial, i.e. hypothyroidism, diabetes, anemia, electrolyte abnormalities as causes of paresthesia. Also consider MERI as patient obese and presenting with fatigue - high pre-test probability.

## 2020-05-29 NOTE — PLAN
[FreeTextEntry1] : CBC - screen for anemia\par BMP/Mag/phos - screen for electrolyte abnormalities\par TSH\par A1c\par Referral for sleep study\par Referral re-placed for surg onc follow up regarding chronic tumor issues\par CPK to check for muscle breakdown.\par \par Will f/u when labs back - plan discussed with patient.

## 2020-05-29 NOTE — REVIEW OF SYSTEMS
[Fatigue] : fatigue [Muscle Pain] : muscle pain [Recent Change In Weight] : ~T no recent weight change [Fever] : no fever [Cough] : no cough [Vision Problems] : no vision problems [Shortness Of Breath] : no shortness of breath [Abdominal Pain] : no abdominal pain [Dyspnea on Exertion] : not dyspnea on exertion [Dysuria] : no dysuria [Frequency] : no frequency [Joint Pain] : no joint pain [Dizziness] : no dizziness [Headache] : no headache

## 2020-07-13 ENCOUNTER — TRANSCRIPTION ENCOUNTER (OUTPATIENT)
Age: 50
End: 2020-07-13

## 2020-07-16 ENCOUNTER — APPOINTMENT (OUTPATIENT)
Dept: SURGICAL ONCOLOGY | Facility: CLINIC | Age: 50
End: 2020-07-16
Payer: MEDICAID

## 2020-07-16 VITALS
OXYGEN SATURATION: 97 % | HEART RATE: 95 BPM | SYSTOLIC BLOOD PRESSURE: 126 MMHG | HEIGHT: 70 IN | DIASTOLIC BLOOD PRESSURE: 92 MMHG | WEIGHT: 210 LBS | BODY MASS INDEX: 30.06 KG/M2

## 2020-07-16 VITALS — TEMPERATURE: 98.3 F

## 2020-07-16 PROCEDURE — 99214 OFFICE O/P EST MOD 30 MIN: CPT

## 2020-07-21 ENCOUNTER — EMERGENCY (EMERGENCY)
Facility: HOSPITAL | Age: 50
LOS: 1 days | Discharge: HOME | End: 2020-07-21
Attending: EMERGENCY MEDICINE | Admitting: EMERGENCY MEDICINE
Payer: MEDICAID

## 2020-07-21 VITALS
OXYGEN SATURATION: 98 % | SYSTOLIC BLOOD PRESSURE: 143 MMHG | DIASTOLIC BLOOD PRESSURE: 75 MMHG | WEIGHT: 214.95 LBS | HEIGHT: 70 IN | RESPIRATION RATE: 16 BRPM | HEART RATE: 64 BPM | TEMPERATURE: 98 F

## 2020-07-21 VITALS
DIASTOLIC BLOOD PRESSURE: 86 MMHG | HEART RATE: 68 BPM | SYSTOLIC BLOOD PRESSURE: 146 MMHG | OXYGEN SATURATION: 95 % | RESPIRATION RATE: 15 BRPM

## 2020-07-21 DIAGNOSIS — Z98.890 OTHER SPECIFIED POSTPROCEDURAL STATES: Chronic | ICD-10-CM

## 2020-07-21 PROCEDURE — 99283 EMERGENCY DEPT VISIT LOW MDM: CPT

## 2020-07-21 RX ORDER — CAPSAICIN 0.025 %
1 CREAM (GRAM) TOPICAL
Qty: 30 | Refills: 0
Start: 2020-07-21 | End: 2020-07-25

## 2020-07-21 RX ORDER — IBUPROFEN 200 MG
600 TABLET ORAL ONCE
Refills: 0 | Status: COMPLETED | OUTPATIENT
Start: 2020-07-21 | End: 2020-07-21

## 2020-07-21 RX ORDER — ACETAMINOPHEN 500 MG
650 TABLET ORAL ONCE
Refills: 0 | Status: COMPLETED | OUTPATIENT
Start: 2020-07-21 | End: 2020-07-21

## 2020-07-21 RX ADMIN — Medication 650 MILLIGRAM(S): at 09:15

## 2020-07-21 RX ADMIN — Medication 600 MILLIGRAM(S): at 09:15

## 2020-07-21 NOTE — ED ADULT NURSE NOTE - OBJECTIVE STATEMENT
rash to right flank region in a line just completed acyclovir for shingles no d/c from rash  pain to site no fever or chills pt is mildly depressed due to pandemic  and son in [place] recently diagnosed with brain cancer and cant see him because [place] closed to foreigners at this time due to covid  but pt does not want to hurt self

## 2020-07-21 NOTE — ED ADULT NURSE NOTE - CHPI ED NUR SYMPTOMS NEG
no confusion/no decreased eating/drinking/no petechia/no chills/no itching/no body aches/no fever/no vomiting/no scaly patches on skin

## 2020-07-21 NOTE — ED PROVIDER NOTE - NSFOLLOWUPINSTRUCTIONS_ED_ALL_ED_FT
1.  Take Motrin 400mg every 6 hours for 5 days with meal.  2.  Take Tylenol 650mg every 5 hours for 5 days.  3.  Apply capsaicin as prescribed.      SHINGLES - AfterCare(R) Instructions(ER/ED)     Shingles    WHAT YOU NEED TO KNOW:    Shingles is a painful rash. Shingles is caused by the same virus that causes chickenpox (varicella-zoster). After you get chickenpox, the virus stays in your body for several years without causing any symptoms. Shingles occurs when the virus becomes active again. The active virus travels along a nerve to your skin and causes a rash.    DISCHARGE INSTRUCTIONS:    Call your local emergency number (911 in the [place]) if:     You have trouble moving your arms, legs, or face.      You become confused, or have difficulty speaking.      You have a seizure.    Return to the emergency department if:     You have weakness in an arm or leg.      You have dizziness, a severe headache, or hearing or vision loss.      You have painful, red, warm skin around the blisters, or the blisters drain pus.      Your neck is stiff or you have trouble moving it.    Call your doctor if:     You feel weak or have a headache.      You have a cough, chills, or a fever.      You have abdominal pain or nausea, or you are vomiting.      Your rash becomes more itchy or painful.      Your rash spreads to other parts of your body.      Your pain worsens and does not go away even after you take medicine.      You have questions or concerns about your condition or care.    Medicines: You may need any of the following:    Antiviral medicine helps decrease symptoms and healing time. They may also decrease your risk of developing nerve pain. You will need to start taking them within 3 days of the start of symptoms to prevent nerve pain.      Prescription pain medicine may be given. Ask your healthcare provider how to take this medicine safely. Some prescription pain medicines contain acetaminophen. Do not take other medicines that contain acetaminophen without talking to your healthcare provider. Too much acetaminophen may cause liver damage. Prescription pain medicine may cause constipation. Ask your healthcare provider how to prevent or treat constipation.       Acetaminophen decreases pain and fever. It is available without a doctor's order. Ask how much to take and how often to take it. Follow directions. Read the labels of all other medicines you are using to see if they also contain acetaminophen, or ask your doctor or pharmacist. Acetaminophen can cause liver damage if not taken correctly. Do not use more than 4 grams (4,000 milligrams) total of acetaminophen in one day.       NSAIDs, such as ibuprofen, help decrease swelling, pain, and fever. This medicine is available with or without a doctor's order. NSAIDs can cause stomach bleeding or kidney problems in certain people. If you take blood thinner medicine, always ask if NSAIDs are safe for you. Always read the medicine label and follow directions. Do not give these medicines to children under 6 months of age without direction from your child's healthcare provider.      Topical anesthetics are used to numb the skin and decrease pain. They can be a cream, gel, spray, or patch.       Anticonvulsants decrease nerve pain and may help you sleep at night.      Antidepressants may be used to decrease nerve pain.      Take your medicine as directed. Contact your healthcare provider if you think your medicine is not helping or if you have side effects. Tell him of her if you are allergic to any medicine. Keep a list of the medicines, vitamins, and herbs you take. Include the amounts, and when and why you take them. Bring the list or the pill bottles to follow-up visits. Carry your medicine list with you in case of an emergency.    Self-care: Keep your rash clean and dry. Cover your rash with a bandage or clothing. Do not use bandages that stick to your skin. The sticky part may irritate your skin and make your rash last longer.    Prevent the spread of germs:          Wash your hands often. Wash your hands several times each day. Wash after you use the bathroom, change a child's diaper, and before you prepare or eat food. Use soap and water every time. Rub your soapy hands together, lacing your fingers. Wash the front and back of your hands, and in between your fingers. Use the fingers of one hand to scrub under the fingernails of the other hand. Wash for at least 20 seconds. Rinse with warm, running water for several seconds. Then dry your hands with a clean towel or paper towel. Use hand  that contains alcohol if soap and water are not available. Do not touch your eyes, nose, or mouth without washing your hands first.Handwashing           Cover a sneeze or cough. Use a tissue that covers your mouth and nose. Throw the tissue away in a trash can right away. Use the bend of your arm if a tissue is not available. Wash your hands well with soap and water or use a hand .      Stay away from others while you are sick. Avoid crowds as much as possible.      Ask about vaccines you may need. Talk to your healthcare provider about your vaccine history. He or she will tell you which vaccines you need, and when to get them.    Prevent shingles or another shingles outbreak: A vaccine may be given to help prevent shingles. You can get the vaccine even if you already had shingles. The vaccine can help prevent a future outbreak. If you do get shingles again, the vaccine can keep it from becoming severe. The vaccine comes in 2 forms. Your healthcare provider will tell you which form is right for you. The decision is based on your age and any medical conditions you have. A 2-dose vaccine is usually given to adults 50 years or older. A 1-dose vaccine may be given to adults 60 years or older.    For more information:     Centers for Disease Control and Prevention  [address] [place]  Phone: [phone]  Phone: 1-386-4203607  Web Address: http://www.cdc.gov      Follow up with your doctor as directed: Write down your questions so you remember to ask them during your visits.       © Copyright [email or web address] 2020       back to top                      © Copyright [email or web address] 2020

## 2020-07-21 NOTE — ED ADULT NURSE REASSESSMENT NOTE - NS ED NURSE REASSESS COMMENT FT1
pt re evaluated by md and to be d'c/d  pt discharged stable and ambulatory in nad at present d/c instruction reinforced and pt verbalized understanding vital signs as charted  pt advised  that needs to keep covered  and that fluid is contagious  and that pains may persist even after rash leaves and that if pain continues to follow up  with pain management  for further treatment and pt verbalized understanding pt given referrals from [facility] mandated for information on substance abuse  information and resources as well as information on depression and resources and office of mental health and also explained [facility] has a behavioral health walk in clinic from Monday to Friday from 8 am to 4 pm no weekends or holidays and pt verbalized understanding

## 2020-07-21 NOTE — ED PROVIDER NOTE - PATIENT PORTAL LINK FT
You can access the FollowMyHealth Patient Portal offered by [facility] by registering at the following website: [email or web address]. By joining [facility]’s FollowMyHealth portal, you will also be able to view your health information using other applications (apps) compatible with our system.

## 2020-07-21 NOTE — ED PROVIDER NOTE - OBJECTIVE STATEMENT
Right flank pain with rash x 2 weeks, went to urgent care 10 days ago, Rx only Valtrex x 7 days and finished this past Sunday.   Pain "a little worse" today.   no fever, gu or gi complaints.  normal bm this am.  Rash is improving as per pt's wife.  no other complaints. Right flank pain with rash x 2 weeks, went to urgent care 10 days ago, Rx only Valtrex x 7 days and finished this past Sunday.   Pain "a little worse" today.   no fever, gu or gi complaints.  normal bm this am.  Rash is improving as per pt's wife.  Pt is under a lot of stress bc his son just diagnosed with brain ca in [place] and he can't visit him.  no other complaints.

## 2020-07-21 NOTE — ED PROVIDER NOTE - CARE PROVIDER_API CALL
José Miguel Ohara  ANESTHESIOLOGY  [address] [place] [address]  Phone: [phone]  Fax: [phone]  Follow Up Time:

## 2020-07-22 ENCOUNTER — APPOINTMENT (OUTPATIENT)
Dept: INTERNAL MEDICINE | Facility: CLINIC | Age: 50
End: 2020-07-22

## 2020-08-01 ENCOUNTER — OUTPATIENT (OUTPATIENT)
Dept: OUTPATIENT SERVICES | Facility: HOSPITAL | Age: 50
LOS: 1 days | End: 2020-08-01
Payer: MEDICAID

## 2020-08-01 ENCOUNTER — APPOINTMENT (OUTPATIENT)
Dept: MRI IMAGING | Facility: CLINIC | Age: 50
End: 2020-08-01
Payer: MEDICAID

## 2020-08-01 DIAGNOSIS — Z98.890 OTHER SPECIFIED POSTPROCEDURAL STATES: Chronic | ICD-10-CM

## 2020-08-01 DIAGNOSIS — D36.10 BENIGN NEOPLASM OF PERIPHERAL NERVES AND AUTONOMIC NERVOUS SYSTEM, UNSPECIFIED: ICD-10-CM

## 2020-08-01 PROCEDURE — 73720 MRI LWR EXTREMITY W/O&W/DYE: CPT | Mod: 26,LT

## 2020-08-01 PROCEDURE — A9585: CPT

## 2020-08-01 PROCEDURE — 73720 MRI LWR EXTREMITY W/O&W/DYE: CPT

## 2020-08-12 ENCOUNTER — APPOINTMENT (OUTPATIENT)
Dept: INTERNAL MEDICINE | Facility: CLINIC | Age: 50
End: 2020-08-12
Payer: MEDICAID

## 2020-08-12 ENCOUNTER — OUTPATIENT (OUTPATIENT)
Dept: OUTPATIENT SERVICES | Facility: HOSPITAL | Age: 50
LOS: 1 days | End: 2020-08-12
Payer: MEDICAID

## 2020-08-12 VITALS
HEIGHT: 70 IN | SYSTOLIC BLOOD PRESSURE: 122 MMHG | BODY MASS INDEX: 32.64 KG/M2 | DIASTOLIC BLOOD PRESSURE: 80 MMHG | WEIGHT: 228 LBS

## 2020-08-12 DIAGNOSIS — Z87.891 PERSONAL HISTORY OF NICOTINE DEPENDENCE: ICD-10-CM

## 2020-08-12 DIAGNOSIS — Z98.890 OTHER SPECIFIED POSTPROCEDURAL STATES: Chronic | ICD-10-CM

## 2020-08-12 DIAGNOSIS — I10 ESSENTIAL (PRIMARY) HYPERTENSION: ICD-10-CM

## 2020-08-12 DIAGNOSIS — Z80.0 FAMILY HISTORY OF MALIGNANT NEOPLASM OF DIGESTIVE ORGANS: ICD-10-CM

## 2020-08-12 DIAGNOSIS — B35.1 TINEA UNGUIUM: ICD-10-CM

## 2020-08-12 DIAGNOSIS — Z00.00 ENCOUNTER FOR GENERAL ADULT MEDICAL EXAMINATION WITHOUT ABNORMAL FINDINGS: ICD-10-CM

## 2020-08-12 PROCEDURE — G0463: CPT

## 2020-08-12 PROCEDURE — 99396 PREV VISIT EST AGE 40-64: CPT | Mod: GC

## 2020-08-13 ENCOUNTER — APPOINTMENT (OUTPATIENT)
Dept: PLASTIC SURGERY | Facility: CLINIC | Age: 50
End: 2020-08-13
Payer: MEDICAID

## 2020-08-13 VITALS
TEMPERATURE: 98.3 F | OXYGEN SATURATION: 98 % | WEIGHT: 228 LBS | HEIGHT: 70 IN | DIASTOLIC BLOOD PRESSURE: 80 MMHG | HEART RATE: 78 BPM | BODY MASS INDEX: 32.64 KG/M2 | SYSTOLIC BLOOD PRESSURE: 142 MMHG

## 2020-08-13 PROCEDURE — 20612 ASPIRATE/INJ GANGLION CYST: CPT

## 2020-08-13 PROCEDURE — 99213 OFFICE O/P EST LOW 20 MIN: CPT | Mod: 25

## 2020-08-13 NOTE — HISTORY OF PRESENT ILLNESS
[de-identified] : 48 yo male with hx of L. thigh schwannoma and R. leg dermatofibroma s/p resections. Immigrated in 1997 from [place]. Recently treated for shingles over summer at urgent care facility. Today presenting for annual physical exam. Reports tiredness, and new life stressors. Patient's son is in [facility] and was recently diagnosed with brain cancer, s/p resection and now getting radiation. Patient reports he cannot visit 2/2 pandemic and feels he is more stressed and eating to cope. Denies stool changes, SOB, abdominal pain, chest pain and fever/chills. [FreeTextEntry1] : Annual Physical, Tiredness

## 2020-08-13 NOTE — REVIEW OF SYSTEMS
[Fatigue] : fatigue [Skin Rash] : skin rash [Fever] : no fever [Chills] : no chills [Night Sweats] : no night sweats [Hearing Loss] : no hearing loss [Palpitations] : no palpitations [Nasal Discharge] : no nasal discharge [Shortness Of Breath] : no shortness of breath [Lower Ext Edema] : no lower extremity edema [Cough] : no cough [Nausea] : no nausea [Abdominal Pain] : no abdominal pain [Constipation] : no constipation [Diarrhea] : no diarrhea [Vomiting] : no vomiting [Dysuria] : no dysuria [de-identified] : Recent hx of shingles [Hematuria] : no hematuria

## 2020-08-13 NOTE — ASSESSMENT
[FreeTextEntry1] : 48yo M with hx of left thigh schwannoma and R. leg dermatofibroma s/p resections, who presents to clinic for annual physical exam with complaints of chronic tiredness, pain and discoloration in toenail and recently treated shingles.\par \par \par

## 2020-08-13 NOTE — PHYSICAL EXAM
[No Acute Distress] : no acute distress [Well Developed] : well developed [Normal Outer Ear/Nose] : the outer ears and nose were normal in appearance [Normal Sclera/Conjunctiva] : normal sclera/conjunctiva [EOMI] : extraocular movements intact [Clear to Auscultation] : lungs were clear to auscultation bilaterally [No Respiratory Distress] : no respiratory distress  [Supple] : supple [Normal S1, S2] : normal S1 and S2 [Normal Rate] : normal rate  [Regular Rhythm] : with a regular rhythm [No Murmur] : no murmur heard [Soft] : abdomen soft [No Extremity Clubbing/Cyanosis] : no extremity clubbing/cyanosis [No Edema] : there was no peripheral edema [Non Tender] : non-tender [Non-distended] : non-distended [No Joint Swelling] : no joint swelling [Normal Bowel Sounds] : normal bowel sounds [de-identified] :  Yellow discoloration of L. big toe with thickening of nail. [de-identified] : Macules and cafe au lait spots present on left side of abdomen, does not cross the midline.

## 2020-08-13 NOTE — HEALTH RISK ASSESSMENT
[Poor] : ~his/her~ mood as  poor [Good] : ~his/her~ current health as good [Intercurrent Urgi Care visits] : went to urgent care [0] : 1) Little interest or pleasure doing things: Not at all (0) [No] : No [6-10] : 6-10 [1] : 2) Feeling down, depressed, or hopeless for several days (1) [Patient reported colonoscopy was normal] : Patient reported colonoscopy was normal [Employed] : employed [With Family] : lives with family [] :  [# Of Children ___] : has [unfilled] children [Feels Safe at Home] : Feels safe at home [Fully functional (bathing, dressing, toileting, transferring, walking, feeding)] : Fully functional (bathing, dressing, toileting, transferring, walking, feeding) [Fully functional (using the telephone, shopping, preparing meals, housekeeping, doing laundry, using] : Fully functional and needs no help or supervision to perform IADLs (using the telephone, shopping, preparing meals, housekeeping, doing laundry, using transportation, managing medications and managing finances) [] : No [de-identified] : Quit 5 years ago [[id number]] : 1 [Reports changes in hearing] : Reports no changes in hearing [de-identified] :  [Reports changes in vision] : Reports no changes in vision [ColonoscopyDate] : 06/18

## 2020-08-13 NOTE — PLAN
[FreeTextEntry1] : #Fatigue: patient reports continued fatigue stable from last admission. Etiology possible multifactorial, reports increased energy at lower weight Was given outpt sleep study referral in May, has not been scheduled yet.\par -Labs: HgbA1c, CMP, CBC\par -Counseled patient on adhering to better diet, resources provided\par -F/U outpatient sleep study\par \par #Obesity: BMI today 32.7. 18# weight gain from last office visit in 2019. Patient reports poor diet and new life stressors which he believes is leading him to eating more.\par -Counseled patient on adhering to better diet, resources provided\par -F/U HgbA1c\par \par #Onychomycosis: Patient with evidence and symptoms of onychomycosis of L. big toe.\par -OTC recommendations for 40% urea at night to soften nail and tea tree oil application during the day \par -Podiatry referral provided\par \par #Shingles: Treated with valcyte.\par -May f/u in 6-12 months to get Shingrex vaccine.\par \par #HCM\par - PHQ2 performed today, scored 1\par - Colon cancer: last colonoscopy 6/2018 wnl, may f/u in 2028\par - Immunizations: Patient has not received the flu vaccine for past 2 years. Counselled on importance of getting flu vaccine every year, especially with children at home.

## 2020-08-14 LAB
ALBUMIN SERPL ELPH-MCNC: 5 G/DL
ALP BLD-CCNC: 72 U/L
ALT SERPL-CCNC: 79 U/L
ANION GAP SERPL CALC-SCNC: 12 MMOL/L
AST SERPL-CCNC: 45 U/L
BILIRUB SERPL-MCNC: 0.5 MG/DL
BUN SERPL-MCNC: 15 MG/DL
CALCIUM SERPL-MCNC: 9.9 MG/DL
CHLORIDE SERPL-SCNC: 102 MMOL/L
CHOLEST SERPL-MCNC: 248 MG/DL
CHOLEST/HDLC SERPL: 5.3 RATIO
CO2 SERPL-SCNC: 27 MMOL/L
CREAT SERPL-MCNC: 0.91 MG/DL
ESTIMATED AVERAGE GLUCOSE: 117 MG/DL
GLUCOSE SERPL-MCNC: 105 MG/DL
HBA1C MFR BLD HPLC: 5.7 %
HDLC SERPL-MCNC: 47 MG/DL
LDLC SERPL CALC-MCNC: 145 MG/DL
POTASSIUM SERPL-SCNC: 5 MMOL/L
PROT SERPL-MCNC: 7.4 G/DL
SARS-COV-2 IGG SERPL IA-ACNC: 0.09 INDEX
SARS-COV-2 IGG SERPL QL IA: NEGATIVE
SODIUM SERPL-SCNC: 141 MMOL/L
TRIGL SERPL-MCNC: 280 MG/DL

## 2020-08-19 ENCOUNTER — APPOINTMENT (OUTPATIENT)
Dept: PLASTIC SURGERY | Facility: CLINIC | Age: 50
End: 2020-08-19

## 2020-09-03 ENCOUNTER — APPOINTMENT (OUTPATIENT)
Dept: PLASTIC SURGERY | Facility: CLINIC | Age: 50
End: 2020-09-03
Payer: MEDICAID

## 2020-09-03 VITALS
HEART RATE: 59 BPM | WEIGHT: 228 LBS | SYSTOLIC BLOOD PRESSURE: 134 MMHG | DIASTOLIC BLOOD PRESSURE: 81 MMHG | BODY MASS INDEX: 32.64 KG/M2 | OXYGEN SATURATION: 97 % | TEMPERATURE: 97.8 F | HEIGHT: 70 IN

## 2020-09-03 PROCEDURE — 99213 OFFICE O/P EST LOW 20 MIN: CPT

## 2020-09-03 NOTE — REASON FOR VISIT
[Follow-Up: _____] : a [unfilled] follow-up visit [FreeTextEntry1] : Pt presents here for a follow up visit regarding a left wrist mass. Pt is c/o fluid build up. Pt's last aspiration of the left wrist mass was performed 08/13/2020.

## 2020-09-03 NOTE — PHYSICAL EXAM
[de-identified] : The patient is ambulatory, well groomed, with no signs of cachexia.  [de-identified] : The neck is soft without edema, masses, or crepitus.  The trachea is midline.  \par There is no thyromegaly or palpable thyroid nodules.  [de-identified] : The patient has no tachypnea or use of accessory respiratory muscles.  [de-identified] : The extremities have no swelling, tenderness, or varicosities.  \par On palpation, the extremities are warm with palpable pedal pulses [de-identified] : The patient has normal gait and station.\par With inspection and palpation of digits and nails, there is no clubbing, ischemia, or infections noted. [de-identified] : Left wrist - fullness/mass like noted on the volar aspect of wrist, dorsal aspect incision well healed, no mass\par Non tender, wrist FROM\par SILT, brisk cap refill [de-identified] : CN 2 - 12 are intact\par No sensory disturbances are noted (e.g., by touch)  [de-identified] : The patient is alert and oriented to time, place, and person. \par There is no obvious disorder in mood or affect such as anxiety or agitation.

## 2020-09-03 NOTE — ASSESSMENT
[FreeTextEntry1] : 49 year old male here for follow up visit regarding left wrist mass. \par He is s/p excisional biopsy of volar and dorsal left wrist masses DOS: 07/29/19. \par He had aspiration last visit on 8/13/20\par Left wrist mass recurrence.\par  \par Various treatment options were discussed with the patient today at length. We discussed three options: observation, aspiration and surgical excision. Patient would like to proceed with surgery.   Surgical excision of the mass was discussed as well as the potential risks and complications which include but are not limited to infection, bleeding, recurrence, stiffness, asymmetry, deformity, scarring, paresthesia, wound dehiscence.  All questions were answered.\par \par At this point, given history of prior resection, and recurrence, will obtain an MRI to further assess prior to proceeding with surgery.

## 2020-09-03 NOTE — HISTORY OF PRESENT ILLNESS
[FreeTextEntry1] : Patient is here for follow up left wrist mass. Patient was seen 8/13/20 for a recurrent mass on his left volar wrist. Aspiration was performed. Patient reports that the next day he noted increase in size in again. Denies any trauma. Denies any pain. Denies numbness or tingling.\par Patient has a left volar and dorsal wrist mass excision DOS 7/29/19 which were consistent with ganglion cyst.\par

## 2020-09-23 NOTE — ED ADULT TRIAGE NOTE - SPO2 (%)
Please call to clarify. Referral placed 9/2/20. Is this a new referral or to different home care agency.  Electronically signed by Ivan Baeza MD     100

## 2020-10-30 ENCOUNTER — NON-APPOINTMENT (OUTPATIENT)
Age: 50
End: 2020-10-30

## 2020-11-02 ENCOUNTER — APPOINTMENT (OUTPATIENT)
Dept: INTERNAL MEDICINE | Facility: CLINIC | Age: 50
End: 2020-11-02

## 2020-11-06 NOTE — H&P PST ADULT - NSANTHTOTALSCORECAL_ENT_A_CORE
Patient presents for flu vaccination, VIS given,  allergies verified, age appropriate vaccination given as ordered. Patient observed for 15 min. No adverse reaction noted.   NDC# 22560-783-15  LOT# [id number]   EXP.DATE:- 6/30/2021.  
2

## 2020-11-11 ENCOUNTER — APPOINTMENT (OUTPATIENT)
Dept: RADIOLOGY | Facility: CLINIC | Age: 50
End: 2020-11-11
Payer: MEDICAID

## 2020-11-11 ENCOUNTER — APPOINTMENT (OUTPATIENT)
Dept: INTERNAL MEDICINE | Facility: CLINIC | Age: 50
End: 2020-11-11
Payer: MEDICAID

## 2020-11-11 ENCOUNTER — OUTPATIENT (OUTPATIENT)
Dept: OUTPATIENT SERVICES | Facility: HOSPITAL | Age: 50
LOS: 1 days | End: 2020-11-11
Payer: MEDICAID

## 2020-11-11 ENCOUNTER — LABORATORY RESULT (OUTPATIENT)
Age: 50
End: 2020-11-11

## 2020-11-11 VITALS
HEIGHT: 70 IN | SYSTOLIC BLOOD PRESSURE: 138 MMHG | DIASTOLIC BLOOD PRESSURE: 80 MMHG | WEIGHT: 206 LBS | BODY MASS INDEX: 29.49 KG/M2

## 2020-11-11 DIAGNOSIS — Z98.890 OTHER SPECIFIED POSTPROCEDURAL STATES: Chronic | ICD-10-CM

## 2020-11-11 DIAGNOSIS — R22.32 LOCALIZED SWELLING, MASS AND LUMP, LEFT UPPER LIMB: ICD-10-CM

## 2020-11-11 PROCEDURE — 73110 X-RAY EXAM OF WRIST: CPT | Mod: 26,LT

## 2020-11-11 PROCEDURE — 99212 OFFICE O/P EST SF 10 MIN: CPT | Mod: GE

## 2020-11-11 PROCEDURE — 73110 X-RAY EXAM OF WRIST: CPT

## 2020-11-13 ENCOUNTER — NON-APPOINTMENT (OUTPATIENT)
Age: 50
End: 2020-11-13

## 2020-12-12 ENCOUNTER — RESULT REVIEW (OUTPATIENT)
Age: 50
End: 2020-12-12

## 2020-12-12 ENCOUNTER — APPOINTMENT (OUTPATIENT)
Dept: MRI IMAGING | Facility: CLINIC | Age: 50
End: 2020-12-12
Payer: MEDICAID

## 2020-12-12 ENCOUNTER — OUTPATIENT (OUTPATIENT)
Dept: OUTPATIENT SERVICES | Facility: HOSPITAL | Age: 50
LOS: 1 days | End: 2020-12-12
Payer: MEDICAID

## 2020-12-12 DIAGNOSIS — R22.32 LOCALIZED SWELLING, MASS AND LUMP, LEFT UPPER LIMB: ICD-10-CM

## 2020-12-12 DIAGNOSIS — Z98.890 OTHER SPECIFIED POSTPROCEDURAL STATES: Chronic | ICD-10-CM

## 2020-12-12 PROCEDURE — 73223 MRI JOINT UPR EXTR W/O&W/DYE: CPT | Mod: 26,LT

## 2020-12-12 PROCEDURE — 73223 MRI JOINT UPR EXTR W/O&W/DYE: CPT

## 2020-12-12 PROCEDURE — A9585: CPT

## 2020-12-31 ENCOUNTER — APPOINTMENT (OUTPATIENT)
Dept: PLASTIC SURGERY | Facility: CLINIC | Age: 50
End: 2020-12-31
Payer: MEDICAID

## 2020-12-31 VITALS
SYSTOLIC BLOOD PRESSURE: 134 MMHG | HEIGHT: 70 IN | TEMPERATURE: 98.2 F | BODY MASS INDEX: 29.49 KG/M2 | OXYGEN SATURATION: 96 % | DIASTOLIC BLOOD PRESSURE: 81 MMHG | HEART RATE: 76 BPM | WEIGHT: 206 LBS

## 2020-12-31 PROCEDURE — 99212 OFFICE O/P EST SF 10 MIN: CPT

## 2020-12-31 PROCEDURE — 99072 ADDL SUPL MATRL&STAF TM PHE: CPT

## 2021-01-03 NOTE — PHYSICAL EXAM
[de-identified] : The patient is ambulatory, well groomed, with no signs of cachexia.  [de-identified] : The neck is soft without edema, masses, or crepitus.  The trachea is midline.  \par There is no thyromegaly or palpable thyroid nodules.  [de-identified] : The patient has no tachypnea or use of accessory respiratory muscles.  [de-identified] : The extremities have no swelling, tenderness, or varicosities.  \par On palpation, the extremities are warm with palpable pedal pulses [de-identified] : The patient has normal gait and station.\par With inspection and palpation of digits and nails, there is no clubbing, ischemia, or infections noted. [de-identified] : Left wrist - fullness/mass like noted on the volar aspect of wrist, dorsal aspect incision well healed, no mass\par Non tender, wrist FROM\par SILT, brisk cap refill [de-identified] : CN 2 - 12 are intact\par No sensory disturbances are noted (e.g., by touch)  [de-identified] : The patient is alert and oriented to time, place, and person. \par There is no obvious disorder in mood or affect such as anxiety or agitation.

## 2021-01-03 NOTE — REASON FOR VISIT
[Follow-Up: _____] : a [unfilled] follow-up visit [FreeTextEntry1] : Pt in office to discuss left WRIST MRI results.

## 2021-01-07 ENCOUNTER — OUTPATIENT (OUTPATIENT)
Dept: OUTPATIENT SERVICES | Facility: HOSPITAL | Age: 51
LOS: 1 days | End: 2021-01-07
Payer: MEDICAID

## 2021-01-07 DIAGNOSIS — Z98.890 OTHER SPECIFIED POSTPROCEDURAL STATES: Chronic | ICD-10-CM

## 2021-01-07 DIAGNOSIS — Z20.828 CONTACT WITH AND (SUSPECTED) EXPOSURE TO OTHER VIRAL COMMUNICABLE DISEASES: ICD-10-CM

## 2021-01-07 PROCEDURE — C9803: CPT

## 2021-01-07 PROCEDURE — U0003: CPT

## 2021-01-07 PROCEDURE — U0005: CPT

## 2021-01-08 DIAGNOSIS — Z20.828 CONTACT WITH AND (SUSPECTED) EXPOSURE TO OTHER VIRAL COMMUNICABLE DISEASES: ICD-10-CM

## 2021-01-08 LAB — SARS-COV-2 RNA SPEC QL NAA+PROBE: SIGNIFICANT CHANGE UP

## 2021-01-13 ENCOUNTER — OUTPATIENT (OUTPATIENT)
Dept: OUTPATIENT SERVICES | Facility: HOSPITAL | Age: 51
LOS: 1 days | End: 2021-01-13
Payer: MEDICAID

## 2021-01-13 DIAGNOSIS — Z98.890 OTHER SPECIFIED POSTPROCEDURAL STATES: Chronic | ICD-10-CM

## 2021-01-13 DIAGNOSIS — Z20.828 CONTACT WITH AND (SUSPECTED) EXPOSURE TO OTHER VIRAL COMMUNICABLE DISEASES: ICD-10-CM

## 2021-01-13 LAB — SARS-COV-2 RNA SPEC QL NAA+PROBE: SIGNIFICANT CHANGE UP

## 2021-01-13 PROCEDURE — U0003: CPT

## 2021-01-13 PROCEDURE — C9803: CPT

## 2021-01-13 PROCEDURE — U0005: CPT

## 2021-01-14 DIAGNOSIS — Z20.828 CONTACT WITH AND (SUSPECTED) EXPOSURE TO OTHER VIRAL COMMUNICABLE DISEASES: ICD-10-CM

## 2021-02-08 ENCOUNTER — OUTPATIENT (OUTPATIENT)
Dept: OUTPATIENT SERVICES | Facility: HOSPITAL | Age: 51
LOS: 1 days | End: 2021-02-08
Payer: MEDICAID

## 2021-02-08 DIAGNOSIS — Z20.828 CONTACT WITH AND (SUSPECTED) EXPOSURE TO OTHER VIRAL COMMUNICABLE DISEASES: ICD-10-CM

## 2021-02-08 DIAGNOSIS — Z98.890 OTHER SPECIFIED POSTPROCEDURAL STATES: Chronic | ICD-10-CM

## 2021-02-08 LAB — SARS-COV-2 RNA SPEC QL NAA+PROBE: SIGNIFICANT CHANGE UP

## 2021-02-08 PROCEDURE — C9803: CPT

## 2021-02-08 PROCEDURE — U0003: CPT

## 2021-02-08 PROCEDURE — U0005: CPT

## 2021-02-09 DIAGNOSIS — Z20.828 CONTACT WITH AND (SUSPECTED) EXPOSURE TO OTHER VIRAL COMMUNICABLE DISEASES: ICD-10-CM

## 2021-02-10 ENCOUNTER — OUTPATIENT (OUTPATIENT)
Dept: OUTPATIENT SERVICES | Facility: HOSPITAL | Age: 51
LOS: 1 days | End: 2021-02-10

## 2021-02-10 VITALS
OXYGEN SATURATION: 98 % | WEIGHT: 214.07 LBS | DIASTOLIC BLOOD PRESSURE: 80 MMHG | RESPIRATION RATE: 15 BRPM | SYSTOLIC BLOOD PRESSURE: 130 MMHG | HEIGHT: 68 IN | HEART RATE: 76 BPM | TEMPERATURE: 98 F

## 2021-02-10 DIAGNOSIS — Z98.890 OTHER SPECIFIED POSTPROCEDURAL STATES: Chronic | ICD-10-CM

## 2021-02-10 DIAGNOSIS — M67.432 GANGLION, LEFT WRIST: ICD-10-CM

## 2021-02-10 DIAGNOSIS — M67.432 GANGLION, LEFT WRIST: Chronic | ICD-10-CM

## 2021-02-10 DIAGNOSIS — Z01.818 ENCOUNTER FOR OTHER PREPROCEDURAL EXAMINATION: ICD-10-CM

## 2021-02-10 DIAGNOSIS — R22.32 LOCALIZED SWELLING, MASS AND LUMP, LEFT UPPER LIMB: ICD-10-CM

## 2021-02-10 LAB
HCT VFR BLD CALC: 48.2 % — SIGNIFICANT CHANGE UP (ref 39–50)
HGB BLD-MCNC: 15.4 G/DL — SIGNIFICANT CHANGE UP (ref 13–17)
MCHC RBC-ENTMCNC: 28.9 PG — SIGNIFICANT CHANGE UP (ref 27–34)
MCHC RBC-ENTMCNC: 32 GM/DL — SIGNIFICANT CHANGE UP (ref 32–36)
MCV RBC AUTO: 90.6 FL — SIGNIFICANT CHANGE UP (ref 80–100)
NRBC # BLD: 0 /100 WBCS — SIGNIFICANT CHANGE UP
NRBC # FLD: 0 K/UL — SIGNIFICANT CHANGE UP
PLATELET # BLD AUTO: 368 K/UL — SIGNIFICANT CHANGE UP (ref 150–400)
RBC # BLD: 5.32 M/UL — SIGNIFICANT CHANGE UP (ref 4.2–5.8)
RBC # FLD: 12.5 % — SIGNIFICANT CHANGE UP (ref 10.3–14.5)
WBC # BLD: 6.91 K/UL — SIGNIFICANT CHANGE UP (ref 3.8–10.5)
WBC # FLD AUTO: 6.91 K/UL — SIGNIFICANT CHANGE UP (ref 3.8–10.5)

## 2021-02-10 RX ORDER — SODIUM CHLORIDE 9 MG/ML
1000 INJECTION, SOLUTION INTRAVENOUS
Refills: 0 | Status: DISCONTINUED | OUTPATIENT
Start: 2021-02-22 | End: 2021-03-08

## 2021-02-10 NOTE — H&P PST ADULT - MUSCULOSKELETAL COMMENTS
hx of left wrist ganglion cyst, s/p excision 2019.  Pt reports mass returned.  now scheduled for Excisional biopsy left wrist mass. possible local flap left wrist mass palpated

## 2021-02-10 NOTE — H&P PST ADULT - NSICDXPASTMEDICALHX_GEN_ALL_CORE_FT
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

## 2021-02-10 NOTE — H&P PST ADULT - NSICDXPROBLEM_GEN_ALL_CORE_FT
PROBLEM DIAGNOSES  Problem: Mass of wrist, left  Assessment and Plan:  Excisional biopsy left wrist mass. possible local flap  CBC  Preop instructions and antibacterial soap given and explained (verbal and written), with teach back.

## 2021-02-10 NOTE — H&P PST ADULT - NSICDXPASTSURGICALHX_GEN_ALL_CORE_FT
PAST SURGICAL HISTORY:  Ganglion cyst of wrist, left excision 2019    H/O excision of mass left thigh benign    History of drainage of abscess 2012 anorectal    History of excision of mass right leg 6/2018    S/P colonoscopy 2012    S/P hernia repair

## 2021-02-10 NOTE — H&P PST ADULT - HISTORY OF PRESENT ILLNESS
51 y/o male with hx of left wrist ganglion cyst, s/p excision 2019.  Pt reports mass returned.  now scheduled for Excisional biopsy left wrist mass. possible local flap

## 2021-02-11 ENCOUNTER — APPOINTMENT (OUTPATIENT)
Dept: INTERNAL MEDICINE | Facility: CLINIC | Age: 51
End: 2021-02-11

## 2021-02-11 ENCOUNTER — OUTPATIENT (OUTPATIENT)
Dept: OUTPATIENT SERVICES | Facility: HOSPITAL | Age: 51
LOS: 1 days | End: 2021-02-11
Payer: MEDICAID

## 2021-02-11 DIAGNOSIS — M67.432 GANGLION, LEFT WRIST: Chronic | ICD-10-CM

## 2021-02-11 DIAGNOSIS — I10 ESSENTIAL (PRIMARY) HYPERTENSION: ICD-10-CM

## 2021-02-11 DIAGNOSIS — Z98.890 OTHER SPECIFIED POSTPROCEDURAL STATES: Chronic | ICD-10-CM

## 2021-02-11 PROCEDURE — G0463: CPT

## 2021-02-11 RX ORDER — UREA 450 MG/ML
45 GEL TOPICAL
Qty: 1 | Refills: 11 | Status: DISCONTINUED | COMMUNITY
Start: 2020-08-13 | End: 2021-02-11

## 2021-02-11 RX ORDER — INFANT FORMULA, IRON/DHA/ARA 2.1 G/1
100 POWDER (GRAM) ORAL
Qty: 1 | Refills: 11 | Status: DISCONTINUED | COMMUNITY
Start: 2020-08-13 | End: 2021-02-11

## 2021-02-12 ENCOUNTER — APPOINTMENT (OUTPATIENT)
Dept: INTERNAL MEDICINE | Facility: CLINIC | Age: 51
End: 2021-02-12

## 2021-02-12 PROBLEM — M67.439 GANGLION, UNSPECIFIED WRIST: Chronic | Status: ACTIVE | Noted: 2021-02-10

## 2021-02-12 PROBLEM — E66.9 OBESITY, UNSPECIFIED: Chronic | Status: ACTIVE | Noted: 2021-02-10

## 2021-02-12 NOTE — HISTORY OF PRESENT ILLNESS
[Other Location: e.g. School (Enter Location, City,State)___] : at [unfilled], at the time of the visit. [Other Location: e.g. Home (Enter Location, City,State)___] : at [unfilled] [Verbal consent obtained from patient] : the patient, [unfilled] [FreeTextEntry1] : abd pain  [de-identified] : 51 yo male with hx of L. thigh schwannoma and R. leg dermatofibroma s/p resections p/w abd pain. Patient just came back from  [facility] 1 week ago. Patient complains of periumbilical pain for 2-3 days. Pain is intermittent and worse later on in the day. Pain is worse after eating. Patient denies fever, chills, N/V/C. Patient had diarrhea for 2 days, which resolved yesterday. Pain has been improving since resolution of diarrhea. Patient states that he ate barbecue, pasta, pork, fish, salad while he was in [facility], and they were all cooked differently.   Patient denies any urinary changes. \par \par Pt had episode of midline chest pain and SOB today. CP radiated to L upper back. Pain lasted for 3 minutes. Patient was driving when this occurred. Pt reports having CP 2 years ago. EKG was wnl at that time.

## 2021-02-12 NOTE — REVIEW OF SYSTEMS
[Chest Pain] : chest pain [Shortness Of Breath] : shortness of breath [Abdominal Pain] : abdominal pain [Fever] : no fever [Chills] : no chills [Palpitations] : no palpitations [Claudication] : no  leg claudication [Lower Ext Edema] : no lower extremity edema [Wheezing] : no wheezing [Cough] : no cough [Nausea] : no nausea [Constipation] : no constipation [Diarrhea] : no diarrhea [Vomiting] : no vomiting [Melena] : no melena [Incontinence] : no incontinence [Dysuria] : no dysuria [Hematuria] : no hematuria [Frequency] : no frequency [FreeTextEntry7] : Below umbilicus  [FreeTextEntry6] : SOB today w/ CP

## 2021-02-12 NOTE — PHYSICAL EXAM
[Normal Sclera/Conjunctiva] : normal sclera/conjunctiva [No Respiratory Distress] : no respiratory distress  [No Accessory Muscle Use] : no accessory muscle use [Soft] : abdomen soft [Non Tender] : non-tender [No Rash] : no rash [Normal] : affect was normal and insight and judgment were intact [de-identified] : TEB, exam limited

## 2021-02-12 NOTE — ASSESSMENT
[FreeTextEntry1] : 51 yo male with hx of L. thigh schwannoma and R. leg dermatofibroma s/p resections p/w abd pain and CP\par \par #Abd pain \par -Likely 2/2 acute infectious gastroenteritis, given recent travel history, acute diarrhea, and currently resolving abd pain\par -Encouraged patient to stay hydrated \par -CTM for now. No further workup needed \par -F/u in clinic and reassess abd pain if still continuing \par \par \par #Chest pain \par -episode today last for 2-3 minutes. Patient now feels fine, asx\par -Make appt for clinic to evaluate chest pain. \par -Recommended that patient go to ED if patient has episode again

## 2021-02-12 NOTE — END OF VISIT
[] : Resident [Time Spent: ___ minutes] : I have spent [unfilled] minutes of time on the encounter. [FreeTextEntry3] : 50-year-old man with prediabetes and history of obesity as well as hypercholesterolemia had telephonic visit today for chest pain and abdominal pain.  Abdominal pain was related to diarrheal illness he had while traveling to [facility] [facility] recently reports that things are resolving.  Advised hydration and rest and to call back to clinic if any change in symptoms if patient has bloody diarrhea, or anything else new or concerning.  Regarding chest pain I spoke with patient over the phone as well.  He reported pressure-like chest pain that seem like he was in his throat.  It occurred while he was driving 2 or 3 hours ago.  It resolved within minutes. He never has chest pain on exertion.  He had no associated nausea or diaphoresis.  He did have an ER visit in 2018 for chest pain with negative troponin x2 and normal EKG.  He said that this feels a little different but currently as we are speaking on the phone he did not have any chest pain and he felt in his words great.  Advised patient to make an appointment to come in so we can check an EKG and get more history on the chest pain.  Perhaps we can obtain a stress test.  Should the chest pain come back advised patient that if it is severe or crushing, associated with shortness of breath, nausea, diaphoresis then he should go to the ER right away.  I asked the patient as well if he felt that he needed to go to the ER and he reported that currently he was feeling well and did not need to but would go if he felt differently.

## 2021-02-13 ENCOUNTER — TRANSCRIPTION ENCOUNTER (OUTPATIENT)
Age: 51
End: 2021-02-13

## 2021-02-16 ENCOUNTER — APPOINTMENT (OUTPATIENT)
Dept: INTERNAL MEDICINE | Facility: CLINIC | Age: 51
End: 2021-02-16

## 2021-02-16 ENCOUNTER — OUTPATIENT (OUTPATIENT)
Dept: OUTPATIENT SERVICES | Facility: HOSPITAL | Age: 51
LOS: 1 days | End: 2021-02-16
Payer: MEDICAID

## 2021-02-16 DIAGNOSIS — I10 ESSENTIAL (PRIMARY) HYPERTENSION: ICD-10-CM

## 2021-02-16 DIAGNOSIS — Z98.890 OTHER SPECIFIED POSTPROCEDURAL STATES: Chronic | ICD-10-CM

## 2021-02-16 DIAGNOSIS — M67.432 GANGLION, LEFT WRIST: Chronic | ICD-10-CM

## 2021-02-16 PROCEDURE — G0463: CPT

## 2021-02-16 NOTE — HISTORY OF PRESENT ILLNESS
[Home] : at home, [unfilled] , at the time of the visit. [Other Location: e.g. Home (Enter Location, City,State)___] : at [unfilled] [FreeTextEntry8] : 51 yo male with hx of L. thigh schwannoma and R. leg dermatofibroma s/p resections calling for acute chest pain. Patient endorsed sudden onset of left sided chest pain with radiation to neck and left shoulder while driving 2/12/21. During ep, no SOB, N/V, diaphoresis, or plueritic pain. Patient pulled over and waited until CP resolved. Similar ep 2 years ago at [facility] and attributed to stress. Patient went to [facility] urgent care next day for in office EKG. Per documentation, EKG was normal and patient had no active chest pain. No recurrent episodes of chest pain since. Hx of MI maternal grandfather. Hx of former smoker (10 pack years, quit 6 years ago). Patient endorses stresses due to son's dx of brain cancer, job, and COVID anxiety. \par \par No orthopnea, extremities swelling. SOB a flight of stairs for last 8 months. \par He has no had a cardiac workup in the past.

## 2021-02-16 NOTE — PLAN
[FreeTextEntry1] : Transaminitis: \par -here for repeat lab work \par -will call patient with follow up results \par \par HTN: \par -patient still wants to lose weight and see what happens with his bp \par -hesitant to start medications \par \par Hypercholesterolemia: \par -has made significant lifestyle and dietary changes to lose weight and lower cholesterol \par -patient hesitant to start medications now and wants to stick with lifestyle changes, will repeat lab tests and call with result \par \par Pre-diabetes: \par -as above \par \par patient to f/u in clinic 6 mo or sooner

## 2021-02-16 NOTE — ASSESSMENT
[FreeTextEntry1] : 49 yo male with hx of L. thigh schwannoma and R. leg dermatofibroma s/p resections, history of acute chest pain\par \par #Chest pain - now resolved\par -resolved left chest chest pain with radiation \par -cardiology referral\par -advised if chest pain, SOB, diaphoresis returns, go to the ED \par -obtain ekg and TSH during office visit in 3/9/21 visit as scheduled prior\par

## 2021-02-16 NOTE — HISTORY OF PRESENT ILLNESS
[FreeTextEntry1] : f/u liver enzymes and cholesterol [de-identified] : 50 yo male with hx of L. thigh schwannoma and R. leg dermatofibroma s/p resections. Immigrated in 1997 from [place]. here today for follow up of abnormal lab tests that were resulted at last CPE. patient had transaminitis as well as increase in lipids/cholesterol. since hearing the news, patient has taken a much more proactive approach in regards to his health, doing more daily exercise, losing weight, cutting out mayonnaise specifically and other food with saturated fats. patient has lost 15+ pounds since last visit and is trying to lose more weight. much healthier and feels much better today.

## 2021-02-16 NOTE — ASSESSMENT
[FreeTextEntry1] : 50 yo male with hx of L. thigh schwannoma and R. leg dermatofibroma s/p resections. Immigrated in 1997 from [place]. here today for follow up of abnormal lab tests that were resulted at last CPE

## 2021-02-17 DIAGNOSIS — R74.01 ELEVATION OF LEVELS OF LIVER TRANSAMINASE LEVELS: ICD-10-CM

## 2021-02-17 DIAGNOSIS — R10.9 UNSPECIFIED ABDOMINAL PAIN: ICD-10-CM

## 2021-02-17 DIAGNOSIS — E78.00 PURE HYPERCHOLESTEROLEMIA, UNSPECIFIED: ICD-10-CM

## 2021-02-17 DIAGNOSIS — R07.89 OTHER CHEST PAIN: ICD-10-CM

## 2021-02-17 DIAGNOSIS — R07.9 CHEST PAIN, UNSPECIFIED: ICD-10-CM

## 2021-02-17 DIAGNOSIS — R73.03 PREDIABETES: ICD-10-CM

## 2021-02-19 ENCOUNTER — APPOINTMENT (OUTPATIENT)
Dept: DISASTER EMERGENCY | Facility: CLINIC | Age: 51
End: 2021-02-19

## 2021-02-19 NOTE — ASU PATIENT PROFILE, ADULT - PSH
Ganglion cyst of wrist, left  excision 2019  H/O excision of mass  left thigh benign  History of drainage of abscess  2012 anorectal  History of excision of mass  right leg 6/2018  S/P colonoscopy  2012  S/P hernia repair

## 2021-02-19 NOTE — ASU PATIENT PROFILE, ADULT - PMH
Anorectal abscess    Ganglion cyst of wrist    History of Clostridium difficile colitis  2012  Mass of left thigh  neuroma excised posterior thigh  Obesity    Other benign neoplasm of skin of unspecified lower limb, including hip

## 2021-02-19 NOTE — ASU PATIENT PROFILE, ADULT - TOBACCO USE
-- DO NOT REPLY / DO NOT REPLY ALL --  -- Message is from the [facility]--    COVID-19 Universal Screening: Negative    General Patient Message      Reason for Call: Patients mother is calling to ask Dr. Pinto if she can prescribe a different antibiotic for her daughter. She states her symptoms are still the same and actually restarted this morning with an urgency to urinate. Please give her a call back.    Caller Information       Type Contact Phone    06/22/2020 10:39 AM Phone (Incoming) Mary Harrell (Mother) [phone]          Alternative phone number: [phone]    Turnaround time given to caller:   \"This message will be sent to [state Provider's name]. The clinical team will fulfill your request as soon as they review your message.\"     Never smoker

## 2021-02-20 LAB — SARS-COV-2 N GENE NPH QL NAA+PROBE: NOT DETECTED

## 2021-02-21 ENCOUNTER — TRANSCRIPTION ENCOUNTER (OUTPATIENT)
Age: 51
End: 2021-02-21

## 2021-02-22 ENCOUNTER — APPOINTMENT (OUTPATIENT)
Dept: PLASTIC SURGERY | Facility: HOSPITAL | Age: 51
End: 2021-02-22
Payer: MEDICAID

## 2021-02-22 ENCOUNTER — RESULT REVIEW (OUTPATIENT)
Age: 51
End: 2021-02-22

## 2021-02-22 ENCOUNTER — OUTPATIENT (OUTPATIENT)
Dept: OUTPATIENT SERVICES | Facility: HOSPITAL | Age: 51
LOS: 1 days | Discharge: HOME | End: 2021-02-22
Payer: MEDICAID

## 2021-02-22 VITALS
TEMPERATURE: 98 F | OXYGEN SATURATION: 97 % | RESPIRATION RATE: 12 BRPM | WEIGHT: 214.95 LBS | HEART RATE: 66 BPM | SYSTOLIC BLOOD PRESSURE: 118 MMHG | DIASTOLIC BLOOD PRESSURE: 70 MMHG | HEIGHT: 70 IN

## 2021-02-22 VITALS
DIASTOLIC BLOOD PRESSURE: 78 MMHG | SYSTOLIC BLOOD PRESSURE: 110 MMHG | RESPIRATION RATE: 16 BRPM | OXYGEN SATURATION: 94 % | HEART RATE: 55 BPM

## 2021-02-22 DIAGNOSIS — Z98.890 OTHER SPECIFIED POSTPROCEDURAL STATES: Chronic | ICD-10-CM

## 2021-02-22 DIAGNOSIS — M67.432 GANGLION, LEFT WRIST: Chronic | ICD-10-CM

## 2021-02-22 DIAGNOSIS — M67.432 GANGLION, LEFT WRIST: ICD-10-CM

## 2021-02-22 PROCEDURE — 25112 REREMOVE WRIST TENDON LESION: CPT

## 2021-02-22 PROCEDURE — 88304 TISSUE EXAM BY PATHOLOGIST: CPT | Mod: 26

## 2021-02-22 RX ORDER — OXYCODONE AND ACETAMINOPHEN 5; 325 MG/1; MG/1
2 TABLET ORAL ONCE
Refills: 0 | Status: DISCONTINUED | OUTPATIENT
Start: 2021-02-22 | End: 2021-02-22

## 2021-02-22 RX ORDER — FENTANYL CITRATE 50 UG/ML
50 INJECTION INTRAVENOUS
Refills: 0 | Status: DISCONTINUED | OUTPATIENT
Start: 2021-02-22 | End: 2021-02-22

## 2021-02-22 RX ORDER — ONDANSETRON 8 MG/1
4 TABLET, FILM COATED ORAL ONCE
Refills: 0 | Status: DISCONTINUED | OUTPATIENT
Start: 2021-02-22 | End: 2021-03-08

## 2021-02-22 RX ORDER — FENTANYL CITRATE 50 UG/ML
25 INJECTION INTRAVENOUS
Refills: 0 | Status: DISCONTINUED | OUTPATIENT
Start: 2021-02-22 | End: 2021-02-22

## 2021-02-22 RX ADMIN — FENTANYL CITRATE 25 MICROGRAM(S): 50 INJECTION INTRAVENOUS at 13:34

## 2021-02-22 RX ADMIN — OXYCODONE AND ACETAMINOPHEN 2 TABLET(S): 5; 325 TABLET ORAL at 14:11

## 2021-02-22 NOTE — ASU DISCHARGE PLAN (ADULT/PEDIATRIC) - ASU DC SPECIAL INSTRUCTIONSFT
Please continue to wear the surgical splint and keep it dry at all times.  To help with the swelling please keep your left arm elevated and sleep with it placed on a pillow.    For pain control please take the prescribed medication as instructed.      Please follow up with Dr. Nguyen within x1 week after discharge from the hospital. You may call [phone] to schedule an appointment.

## 2021-02-22 NOTE — BRIEF OPERATIVE NOTE - OPERATION/FINDINGS
Excision of recurrent volar wrist ganglion under tourniquet.  Radial artery identified and preserved.

## 2021-02-22 NOTE — ASU DISCHARGE PLAN (ADULT/PEDIATRIC) - CALL YOUR DOCTOR IF YOU HAVE ANY OF THE FOLLOWING:
Bleeding that does not stop/Pain not relieved by Medications/Fever greater than (need to indicate Fahrenheit or Celsius)/Wound/Surgical Site with redness, or foul smelling discharge or pus/Inability to tolerate liquids or foods Bleeding that does not stop/Swelling that gets worse/Pain not relieved by Medications/Fever greater than (need to indicate Fahrenheit or Celsius)/Wound/Surgical Site with redness, or foul smelling discharge or pus/Numbness, tingling, color or temperature change to extremity/Unable to urinate/Inability to tolerate liquids or foods

## 2021-02-22 NOTE — ASU DISCHARGE PLAN (ADULT/PEDIATRIC) - NURSING INSTRUCTIONS
Call MD for follow-up appt. Call MD for any temperature greater than 101, any numbness or tingling in operated extremity, any reddness or drainage from wound.   DO NOT take any Tylenol (Acetaminophen) or narcotics containing Tylenol until after  __8:15pm..You received Tylenol during your operation and it can cause damage to your liver if too much is taken within a 24 hour time period.

## 2021-02-22 NOTE — ASU DISCHARGE PLAN (ADULT/PEDIATRIC) - CARE PROVIDER_API CALL
Jensen Nguyen (MD)  ColonRectal Surgery; Plastic Surgery; Surgery; Surgery of the Hand  [address] [facility], [facility] [address]  [place] [address]  Phone: [phone]  Fax: [phone]  Follow Up Time: 1 week

## 2021-03-01 LAB — SURGICAL PATHOLOGY STUDY: SIGNIFICANT CHANGE UP

## 2021-03-01 NOTE — ASU PREOP CHECKLIST - CHLOROHEXIDINE WASH IN ASU
29-Jul-2019 Terbinafine Pregnancy And Lactation Text: This medication is Pregnancy Category B and is considered safe during pregnancy. It is also excreted in breast milk and breast feeding isn't recommended.

## 2021-03-02 ENCOUNTER — APPOINTMENT (OUTPATIENT)
Dept: PLASTIC SURGERY | Facility: CLINIC | Age: 51
End: 2021-03-02
Payer: MEDICAID

## 2021-03-02 VITALS
OXYGEN SATURATION: 96 % | HEIGHT: 70 IN | HEART RATE: 64 BPM | DIASTOLIC BLOOD PRESSURE: 84 MMHG | BODY MASS INDEX: 30.92 KG/M2 | WEIGHT: 216 LBS | TEMPERATURE: 98.7 F | SYSTOLIC BLOOD PRESSURE: 134 MMHG

## 2021-03-02 PROCEDURE — 99024 POSTOP FOLLOW-UP VISIT: CPT

## 2021-03-09 ENCOUNTER — APPOINTMENT (OUTPATIENT)
Dept: INTERNAL MEDICINE | Facility: CLINIC | Age: 51
End: 2021-03-09
Payer: MEDICAID

## 2021-03-09 ENCOUNTER — OUTPATIENT (OUTPATIENT)
Dept: OUTPATIENT SERVICES | Facility: HOSPITAL | Age: 51
LOS: 1 days | End: 2021-03-09
Payer: MEDICAID

## 2021-03-09 ENCOUNTER — NON-APPOINTMENT (OUTPATIENT)
Age: 51
End: 2021-03-09

## 2021-03-09 VITALS
WEIGHT: 221 LBS | HEIGHT: 70 IN | DIASTOLIC BLOOD PRESSURE: 80 MMHG | BODY MASS INDEX: 31.64 KG/M2 | OXYGEN SATURATION: 98 % | SYSTOLIC BLOOD PRESSURE: 124 MMHG | HEART RATE: 66 BPM

## 2021-03-09 DIAGNOSIS — Z98.890 OTHER SPECIFIED POSTPROCEDURAL STATES: Chronic | ICD-10-CM

## 2021-03-09 DIAGNOSIS — M67.432 GANGLION, LEFT WRIST: Chronic | ICD-10-CM

## 2021-03-09 DIAGNOSIS — I10 ESSENTIAL (PRIMARY) HYPERTENSION: ICD-10-CM

## 2021-03-09 PROCEDURE — 99213 OFFICE O/P EST LOW 20 MIN: CPT | Mod: GE

## 2021-03-09 PROCEDURE — G0463: CPT

## 2021-03-09 NOTE — PHYSICAL EXAM
[No Acute Distress] : no acute distress [Well Nourished] : well nourished [Well Developed] : well developed [Well-Appearing] : well-appearing [Normal Sclera/Conjunctiva] : normal sclera/conjunctiva [PERRL] : pupils equal round and reactive to light [EOMI] : extraocular movements intact [Normal Outer Ear/Nose] : the outer ears and nose were normal in appearance [Normal Oropharynx] : the oropharynx was normal [No JVD] : no jugular venous distention [No Lymphadenopathy] : no lymphadenopathy [Supple] : supple [No Respiratory Distress] : no respiratory distress  [No Accessory Muscle Use] : no accessory muscle use [Clear to Auscultation] : lungs were clear to auscultation bilaterally [Normal Rate] : normal rate  [Regular Rhythm] : with a regular rhythm [Normal S1, S2] : normal S1 and S2 [No Murmur] : no murmur heard [No Carotid Bruits] : no carotid bruits [No Abdominal Bruit] : a ~M bruit was not heard ~T in the abdomen [No Varicosities] : no varicosities [Pedal Pulses Present] : the pedal pulses are present [No Edema] : there was no peripheral edema [No Extremity Clubbing/Cyanosis] : no extremity clubbing/cyanosis [Soft] : abdomen soft [Non Tender] : non-tender [Non-distended] : non-distended [No Masses] : no abdominal mass palpated [No HSM] : no HSM [Normal Bowel Sounds] : normal bowel sounds [Normal Posterior Cervical Nodes] : no posterior cervical lymphadenopathy [Normal Anterior Cervical Nodes] : no anterior cervical lymphadenopathy [No CVA Tenderness] : no CVA  tenderness [No Joint Swelling] : no joint swelling [Grossly Normal Strength/Tone] : grossly normal strength/tone [No Rash] : no rash [Coordination Grossly Intact] : coordination grossly intact [No Focal Deficits] : no focal deficits [Normal Gait] : normal gait [Normal Affect] : the affect was normal [Normal Insight/Judgement] : insight and judgment were intact

## 2021-03-11 NOTE — REVIEW OF SYSTEMS
[Recent Change In Weight] : ~T recent weight change [Chest Pain] : chest pain [Negative] : Heme/Lymph [Palpitations] : no palpitations [Lower Ext Edema] : no lower extremity edema [Abdominal Pain] : no abdominal pain [FreeTextEntry2] : Weight gain since returning from [place]

## 2021-03-11 NOTE — HISTORY OF PRESENT ILLNESS
[FreeTextEntry8] : 49 yo male with hx of L. thigh schwannoma and R. leg dermatofibroma s/p resections calling for follow-up of chest pain. Patient with multiple episodes in the past year of acute onset chest pain, lateral to the sternum with radiation to upper chest, not provoked by exertion. He denies associated SOB, diaphoresis and dizziness at these times. There have been 3-4 episodes within the past year. Recently underwent treadmill stress test, reportedly with no inducible ischemia. Has echocardiogram scheduled next week. Patient reports today that he may have experienced similar pains "years ago" in which he was found to be H. pylori positive and treated with antibiotics. Patient reports being under stress due to son in [place] undergoing treatment for brain cancer. He was able to recently visit son and returned 4 weeks prior. Endorses dyspepsia, denies fevers, chills, SOB, diarrhea.

## 2021-03-11 NOTE — ASSESSMENT
[FreeTextEntry1] : 49 yo male with hx of L. thigh schwannoma and R. leg dermatofibroma s/p resections calling for follow-up of chest pain.

## 2021-03-11 NOTE — PLAN
[FreeTextEntry1] : #Atypical chest pain:\par -EKG performed today, NSR without ST changes\par -Echo scheduled next week\par -Precautions given for ED visit (CP associated with SOB, diaphoresis, N/V)\par -Check H. pylori antigen to r/o abdominal causes\par -Obtain records from cardiologist of treadmill stress/ echo\par -RTC in 1 month for f/u\par \par D/w Dr. Moses

## 2021-03-12 DIAGNOSIS — R07.89 OTHER CHEST PAIN: ICD-10-CM

## 2021-03-12 NOTE — ED ADULT NURSE NOTE - RECENT EXPOSURE TO
MICU Transfer Note    Transfer from: MICU    Transfer to: ( X ) Medicine    (  ) Telemetry     (   ) RCU        (    ) Palliative         (   ) Stroke Unit          (   ) __________________    Accepting Physician:  Signout given to:     MICU COURSE:          ASSESSMENT & PLAN:             FOR FOLLOW UP: MICU Transfer Note    Transfer from: MICU    Transfer to: ( X ) Medicine    (  ) Telemetry     (   ) RCU        (    ) Palliative         (   ) Stroke Unit          (   ) __________________    Accepting Physician: Melvin Murray   Signout given to:     MICU COURSE:          ASSESSMENT & PLAN:             FOR FOLLOW UP: MICU Transfer Note    Transfer from: MICU    Transfer to: ( X ) Medicine    (  ) Telemetry     (   ) RCU        (    ) Palliative         (   ) Stroke Unit          (   ) __________________    Accepting Physician: Melvin Murray   Signout given to:     MICU COURSE:    Pt is a 81 y/o with hx HTN, T2DM? (last a1c 5.8), worsening Acute Renal Failure superimposed on CKD IV with Chronic HFpEF 60%, admitted 3/4/21 for oliguric renal failure and CHF exacerbation intubated 3/9 for lethargy/airway protection and transferred to MICU.       He was intubated 3/9 for lethargy/airway protection on floor during RRT. CTH without acute changes. He was on sedation. Volume overload thought to be 2/2 renal failure and contributing HF exacerbation. Bedside POCUS without worsening systolic function but shows LVOT obstruction. He was on bumex gtt on floors for volume overload. Diuretics discontinued, R femoral shiley placed, and s/p 3 sessions HD (2.5L removed first 2 sessions, 2L removed 3rd session) - being careful with fluid removal to avoid too much preload reduction with LVOT obstruction. Home HF and HTN meds can be restarted. Nephrology following for future HD sessions.   He has a lot of secretions, needs frequent suctioning. POCUS with bilateral pleural effusion. He became febrile and given LLL consolidation on CXR, thought to be 2/2 pneumonia. BCx NGTD x2 3/10 and sputum culture 3/10 with few pseudomonas. On vanc zosyn, vanc d/c as MRSA nasal swab negative. Will continue zosyn for 5 days.   He was extubated 3/11 pm successfully, A&O x3 and responding coherently. OG tube removed 3/11 pm and diet started. Having daily bm.                 ASSESSMENT & PLAN:             FOR FOLLOW UP: MICU Transfer Note    Transfer from: MICU    Transfer to: ( X ) Medicine    (  ) Telemetry     (   ) RCU        (    ) Palliative         (   ) Stroke Unit          (   ) __________________    Accepting Physician: Melvin Murray   Signout given to:     MICU COURSE:    Pt is a 81 y/o with hx HTN, T2DM? (last a1c 5.8), worsening Acute Renal Failure superimposed on CKD IV with Chronic HFpEF 60%, admitted 3/4/21 for oliguric renal failure and CHF exacerbation intubated 3/9 for lethargy/airway protection and transferred to MICU.       He was intubated 3/9 for lethargy/airway protection on floor during RRT. CTH without acute changes. He was on sedation. Volume overload thought to be 2/2 renal failure and contributing HF exacerbation. Bedside POCUS without worsening systolic function but shows LVOT obstruction. He was on bumex gtt on floors for volume overload. Diuretics discontinued, R femoral shiley placed, and s/p 3 sessions HD (2.5L removed first 2 sessions, 2L removed 3rd session) - being careful with fluid removal to avoid too much preload reduction with LVOT obstruction. Home HF and HTN meds can be restarted. Nephrology following for future HD sessions.   He has a lot of secretions, needs frequent suctioning. POCUS with bilateral pleural effusion. He became febrile and given LLL consolidation on CXR, thought to be 2/2 pneumonia. BCx NGTD x2 3/10 and sputum culture 3/10 with few pseudomonas. On vanc zosyn, vanc d/c as MRSA nasal swab negative. Will continue zosyn for 5 days.   He was extubated 3/11 pm successfully, A&O x3 and responding coherently. OG tube removed 3/11 pm and diet started. Having daily bm.                 ASSESSMENT & PLAN:    Pt is a 81 y/o with hx HTN, T2DM? (last a1c 5.8), worsening Acute Renal Failure superimposed on CKD IV with Chronic HFpEF 60%, admitted 3/4/21 for oliguric renal failure and CHF exacerbation intubated 3/9 for lethargy/airway protection and transferred to MICU s/p 3 sessions HD, extubated 3/11.     NEURO  - pt is intubated since 3/9 for lethargy/airway protection -> extubated 3/11 pm.   - sedation: off sedation  - mental status: A&O x3, responds coherently to all questions  - CTH 3/9: no acute changes      CARDIOVASCULAR  - admitted to hospital with concern for CHF exacerbation (elevated BNP ~30,000, volume overloaded on exam): Echo from 3/5/2021: EF 60%, moderate severe LVOT obstruction, mild segmental LV systolic dysfunction.   - was on bumex gtt on medicine floor; now getting HD via shiley, off diuretics.   - will avoid reducing preload too much with HD given LVOT obstruction  - at home for HTN and heart failure: nifedipine 90mg PO daily, coreg 25mg PO BID, Hydralazine 100mg q8h--- will restart home meds as tolerated.       PULMONARY  - s/p intubation 3/9 for hypercapnic respiratory failure, lethargy, airway protection -> extubated 3/11 pm  - suspect ARIELLE given hypercapnia on admission, body habitus, recommend sleep study outpt  - on bedside US, has bilateral pleural effusion (R>L)  - CXR with consolidation LLL - atelectasis vs pneumonia. bilateral pleural effusions.     ABG - ( 12 Mar 2021 00:29 )  pH, Arterial: 7.42  pH, Blood: x     /  pCO2: 46    /  pO2: 106   / HCO3: 30    / Base Excess: 4.9   /  SaO2: 98            INFECTIOUS DISEASE   - intermittent fevers since 3/10 pm, CXR with LLL consolidation concerning for pneumonia (CXR on admission to hospital did not have this). started on vanc by level and zosyn for empiric coverage 3/10 plan to continue zosyn for 5 days. vanc discontinued as MRSA negative on nasal swab.   - cultures: BCx 3/10 x2 NGTD; sputum culture 3/10 - few pseudomonas; MRSA PCR negative but staph positive.   - LINES: R femoral shiley placed 3/9, an, peripheral IV access      GASTROINTESTINAL  - OG tube removed, pt on diet  - last bm: today am      RENAL  - worsening renal failure on CKD stage IV. Cr 5.49 (4.46 on admission march 4; 3.07 a year ago); BUN improved to 31.  - volume status: volume overloaded with bilateral UE and LE pitting edema, improving after 3 sessions HD with 5L total removed up to date    - monitor electrolytes  - nephrology following, shiley placed 3/9, s/p HD 3/9 (2.5L removed) and 3/10 (2.5L removed) and 3/11 (2L removed). f/u recs.       ENDOCRINOLOGY  - glc stable at 95. on tube feeds, CTM.   - f/u a1c  - SSI as was concern of potential DM    HEMATOLOGY  - hb stable at 9-10; plt stable at 160s.   - no active signs of bleeding    ETHICS  - discussion with spouse 3/9: pt is FULL CODE.             FOR FOLLOW UP:  [ ] f/u nephrology recs  [ ] sleep study outpt as strong suspicion ARIELLE leading to hypercapnia  [ ] zosyn 5 days total for pneumonia (febrile)  [ ] cardiovascular - nephrology mentions possible cath in note - f/u MICU Transfer Note    Transfer from: MICU    Transfer to: ( X ) Medicine    (  ) Telemetry     (   ) RCU        (    ) Palliative         (   ) Stroke Unit          (   ) __________________    Accepting Physician: Melvin Murray   Signout given to:     MICU COURSE:    Pt is a 79 y/o with hx HTN, T2DM? (last a1c 5.8), worsening Acute Renal Failure superimposed on CKD IV with Chronic HFpEF 60%, admitted 3/4/21 for oliguric renal failure and CHF exacerbation intubated 3/9 for lethargy/airway protection and transferred to MICU.       He was intubated 3/9 for lethargy/airway protection on floor during RRT. CTH without acute changes. He was on sedation. Volume overload thought to be 2/2 renal failure and contributing HF exacerbation. Bedside POCUS without worsening systolic function but shows LVOT obstruction. He was on bumex gtt on floors for volume overload. Diuretics discontinued, R femoral shiley placed, and s/p 3 sessions HD (2.5L removed first 2 sessions, 2L removed 3rd session) - being careful with fluid removal to avoid too much preload reduction with LVOT obstruction. Home HF and HTN meds can be restarted. Nephrology following for future HD sessions.   He has a lot of secretions, needs frequent suctioning. POCUS with bilateral pleural effusion. He became febrile and given LLL consolidation on CXR, thought to be 2/2 pneumonia. BCx NGTD x2 3/10 and sputum culture 3/10 with few pseudomonas. On vanc zosyn, vanc d/c as MRSA nasal swab negative. Will continue zosyn for 5 days.   He was extubated 3/11 pm successfully, A&O x3 and responding coherently. OG tube removed 3/11 pm and diet started. Having daily bm.                 ASSESSMENT & PLAN:    Pt is a 79 y/o with hx HTN, T2DM? (last a1c 5.8), worsening Acute Renal Failure superimposed on CKD IV with Chronic HFpEF 60%, admitted 3/4/21 for oliguric renal failure and CHF exacerbation intubated 3/9 for lethargy/airway protection and transferred to MICU s/p 3 sessions HD, extubated 3/11.     NEURO  - pt is intubated since 3/9 for lethargy/airway protection -> extubated 3/11 pm.   - sedation: off sedation  - mental status: A&O x3, responds coherently to all questions  - CTH 3/9: no acute changes      CARDIOVASCULAR  - admitted to hospital with concern for CHF exacerbation (elevated BNP ~30,000, volume overloaded on exam): Echo from 3/5/2021: EF 60%, moderate severe LVOT obstruction, mild segmental LV systolic dysfunction.   - was on bumex gtt on medicine floor; now getting HD via shiley, off diuretics.   - will avoid reducing preload too much with HD given LVOT obstruction  - at home for HTN and heart failure: nifedipine 90mg PO daily, coreg 25mg PO BID, Hydralazine 100mg q8h--- will restart home meds as tolerated.       PULMONARY  - s/p intubation 3/9 for hypercapnic respiratory failure, lethargy, airway protection -> extubated 3/11 pm  - suspect ARIELLE given hypercapnia on admission, body habitus, recommend sleep study outpt  - on bedside US, has bilateral pleural effusion (R>L)  - CXR with consolidation LLL - atelectasis vs pneumonia. bilateral pleural effusions.     ABG - ( 12 Mar 2021 00:29 )  pH, Arterial: 7.42  pH, Blood: x     /  pCO2: 46    /  pO2: 106   / HCO3: 30    / Base Excess: 4.9   /  SaO2: 98            INFECTIOUS DISEASE   - intermittent fevers since 3/10 pm, CXR with LLL consolidation concerning for pneumonia (CXR on admission to hospital did not have this). started on vanc by level and zosyn for empiric coverage 3/10 plan to continue zosyn for 5 days. vanc discontinued as MRSA negative on nasal swab.   - cultures: BCx 3/10 x2 NGTD; sputum culture 3/10 - few pseudomonas; MRSA PCR negative but staph positive.   - LINES: R femoral shiley placed 3/9, an, peripheral IV access      GASTROINTESTINAL  - OG tube removed, pt on diet  - last bm: today am      RENAL  - worsening renal failure on CKD stage IV. Cr 5.49 (4.46 on admission march 4; 3.07 a year ago); BUN improved to 31.  - volume status: volume overloaded with bilateral UE and LE pitting edema, improving after 3 sessions HD with 5L total removed up to date    - monitor electrolytes  - nephrology following, shiley placed 3/9, s/p HD 3/9 (2.5L removed) and 3/10 (2.5L removed) and 3/11 (2L removed). f/u recs.       ENDOCRINOLOGY  - glc stable at 95. on tube feeds, CTM.   - f/u a1c  - SSI as was concern of potential DM    HEMATOLOGY  - hb stable at 9-10; plt stable at 160s.   - no active signs of bleeding    ETHICS  - discussion with spouse 3/9: pt is FULL CODE.             FOR FOLLOW UP:  [ ] f/u nephrology recs  [ ] sleep study outpt as strong suspicion ARIELLE leading to hypercapnia  [ ] c/w zosyn 5 days total (3/10--3/14) for pneumonia (febrile)  [ ] cardiovascular - nephrology mentions possible cath in note - f/u MICU Transfer Note    Transfer from: MICU    Transfer to: ( X ) Medicine    (  ) Telemetry     (   ) RCU        (    ) Palliative         (   ) Stroke Unit          (   ) __________________    Accepting Physician: Melvin Murray   Signout given to:     MICU COURSE:    Pt is a 81 y/o with hx HTN, T2DM? (last a1c 5.8), worsening Acute Renal Failure superimposed on CKD IV with Chronic HFpEF 60%, admitted 3/4/21 for oliguric renal failure and CHF exacerbation intubated 3/9 for lethargy/airway protection and transferred to MICU.       He was intubated 3/9 for lethargy/airway protection on floor during RRT. CTH without acute changes. He was on sedation. Volume overload thought to be 2/2 renal failure and contributing HF exacerbation. Bedside POCUS without worsening systolic function but shows LVOT obstruction. He was on bumex gtt on floors for volume overload. Diuretics discontinued, R femoral shiley placed, and s/p 3 sessions HD (2.5L removed first 2 sessions, 2L removed 3rd session) - being careful with fluid removal to avoid too much preload reduction with LVOT obstruction. Home HF and HTN meds can be restarted. Nephrology following for future HD sessions.   He has a lot of secretions, needs frequent suctioning. POCUS with bilateral pleural effusion. He became febrile and given LLL consolidation on CXR, thought to be 2/2 pneumonia. BCx NGTD x2 3/10 and sputum culture 3/10 with few pseudomonas. On vanc zosyn, vanc d/c as MRSA nasal swab negative. Will continue zosyn for 5 days.   He was extubated 3/11 pm successfully, A&O x3 and responding coherently. OG tube removed 3/11 pm and diet started. Having daily bm.                 ASSESSMENT & PLAN:    Pt is a 81 y/o with hx HTN, T2DM? (last a1c 5.8), worsening Acute Renal Failure superimposed on CKD IV with Chronic HFpEF 60%, admitted 3/4/21 for oliguric renal failure and CHF exacerbation intubated 3/9 for lethargy/airway protection and transferred to MICU s/p 3 sessions HD, extubated 3/11.     NEURO  - pt is intubated since 3/9 for lethargy/airway protection -> extubated 3/11 pm.   - sedation: off sedation  - mental status: A&O x3, responds coherently to all questions  - CTH 3/9: no acute changes      CARDIOVASCULAR  - admitted to hospital with concern for CHF exacerbation (elevated BNP ~30,000, volume overloaded on exam): Echo from 3/5/2021: EF 60%, moderate severe LVOT obstruction, mild segmental LV systolic dysfunction.   - was on bumex gtt on medicine floor; now getting HD via shiley, off diuretics.   - will avoid reducing preload too much with HD given LVOT obstruction  - at home for HTN and heart failure: nifedipine 90mg PO daily, coreg 25mg PO BID, Hydralazine 100mg q8h--- will restart home meds as tolerated.       PULMONARY  - s/p intubation 3/9 for hypercapnic respiratory failure, lethargy, airway protection -> extubated 3/11 pm  - suspect ARIELLE given hypercapnia on admission, body habitus, recommend sleep study outpt  - on bedside US, has bilateral pleural effusion (R>L)  - CXR with consolidation LLL - atelectasis vs pneumonia. bilateral pleural effusions.     ABG - ( 12 Mar 2021 00:29 )  pH, Arterial: 7.42  pH, Blood: x     /  pCO2: 46    /  pO2: 106   / HCO3: 30    / Base Excess: 4.9   /  SaO2: 98            INFECTIOUS DISEASE   - intermittent fevers since 3/10 pm, CXR with LLL consolidation concerning for pneumonia (CXR on admission to hospital did not have this). started on vanc by level and zosyn for empiric coverage 3/10 plan to continue zosyn for 5 days. vanc discontinued as MRSA negative on nasal swab.   - cultures: BCx 3/10 x2 NGTD; sputum culture 3/10 - few pseudomonas; MRSA PCR negative but staph positive.   - LINES: R femoral shiley placed 3/9, an, peripheral IV access      GASTROINTESTINAL  - OG tube removed, pt on diet  - last bm: today am      RENAL  - worsening renal failure on CKD stage IV. Cr 5.49 (4.46 on admission march 4; 3.07 a year ago); BUN improved to 31.  - volume status: volume overloaded with bilateral UE and LE pitting edema, improving after 3 sessions HD with 5L total removed up to date    - monitor electrolytes  - nephrology following, shiley placed 3/9, s/p HD 3/9 (2.5L removed) and 3/10 (2.5L removed) and 3/11 (2L removed). f/u recs - per recs 3/12 pt to have shiley removed and plan for permcath placement by vascular       ENDOCRINOLOGY  - glc stable at 95. on tube feeds, CTM.   - f/u a1c  - SSI as was concern of potential DM    HEMATOLOGY  - hb stable at 9-10; plt stable at 160s.   - no active signs of bleeding    ETHICS  - discussion with spouse 3/9: pt is FULL CODE.             FOR FOLLOW UP:  [ ] f/u nephrology recs - shiley removal and plan for permcath by vascular   [ ] sleep study outpt as strong suspicion ARIELLE leading to hypercapnia  [ ] c/w zosyn 5 days total (3/10--3/14) for pneumonia (febrile)  [ ] cardiovascular - nephrology mentions possible cath in note - f/u MICU Transfer Note    Transfer from: MICU    Transfer to: ( X ) Medicine    (  ) Telemetry     (   ) RCU        (    ) Palliative         (   ) Stroke Unit          (   ) __________________    Accepting Physician: Melvin Murray   Signout given to:     MICU COURSE:    Pt is a 79 y/o with hx HTN, T2DM? (last a1c 5.8), worsening Acute Renal Failure superimposed on CKD IV with Chronic HFpEF 60%, admitted 3/4/21 for oliguric renal failure and CHF exacerbation intubated 3/9 for lethargy/airway protection and transferred to MICU.       He was intubated 3/9 for lethargy/airway protection on floor during RRT. CTH without acute changes. He was on sedation. Volume overload thought to be 2/2 renal failure and contributing HF exacerbation. Bedside POCUS without worsening systolic function but shows LVOT obstruction. He was on bumex gtt on floors for volume overload. Diuretics discontinued, R femoral shiley placed, and s/p 3 sessions HD (2.5L removed first 2 sessions, 2L removed 3rd session) - being careful with fluid removal to avoid too much preload reduction with LVOT obstruction. Home HF and HTN meds can be restarted. Nephrology following for future HD sessions.   He has a lot of secretions, needs frequent suctioning. POCUS with bilateral pleural effusion. He became febrile and given LLL consolidation on CXR, thought to be 2/2 pneumonia. BCx NGTD x2 3/10 and sputum culture 3/10 with few pseudomonas. On vanc zosyn, vanc d/c as MRSA nasal swab negative. Will continue zosyn for 5 days.   He was extubated 3/11 pm successfully, A&O x3 and responding coherently. OG tube removed 3/11 pm and diet started. Having daily bm.                 ASSESSMENT & PLAN:    Pt is a 79 y/o with hx HTN, T2DM? (last a1c 5.8), worsening Acute Renal Failure superimposed on CKD IV with Chronic HFpEF 60%, admitted 3/4/21 for oliguric renal failure and CHF exacerbation intubated 3/9 for lethargy/airway protection and transferred to MICU s/p 3 sessions HD, extubated 3/11.     NEURO  - pt is intubated since 3/9 for lethargy/airway protection -> extubated 3/11 pm.   - sedation: off sedation  - mental status: A&O x3, responds coherently to all questions  - CTH 3/9: no acute changes      CARDIOVASCULAR  - admitted to hospital with concern for CHF exacerbation (elevated BNP ~30,000, volume overloaded on exam): Echo from 3/5/2021: EF 60%, moderate severe LVOT obstruction, mild segmental LV systolic dysfunction.   - was on bumex gtt on medicine floor; now getting HD via shiley, off diuretics.   - will avoid reducing preload too much with HD given LVOT obstruction  - at home for HTN and heart failure: nifedipine 90mg PO daily, coreg 25mg PO BID, Hydralazine 100mg q8h--- will restart home meds as tolerated.       PULMONARY  - s/p intubation 3/9 for hypercapnic respiratory failure, lethargy, airway protection -> extubated 3/11 pm  - suspect ARIELLE given hypercapnia on admission, body habitus, recommend sleep study outpt  - on bedside US, has bilateral pleural effusion (R>L)  - CXR with consolidation LLL - atelectasis vs pneumonia. bilateral pleural effusions.     ABG - ( 12 Mar 2021 00:29 )  pH, Arterial: 7.42  pH, Blood: x     /  pCO2: 46    /  pO2: 106   / HCO3: 30    / Base Excess: 4.9   /  SaO2: 98            INFECTIOUS DISEASE   - intermittent fevers since 3/10 pm, CXR with LLL consolidation concerning for pneumonia (CXR on admission to hospital did not have this). started on vanc by level and zosyn for empiric coverage 3/10 plan to continue zosyn for 5 days. vanc discontinued as MRSA negative on nasal swab.   - cultures: BCx 3/10 x2 NGTD; sputum culture 3/10 - few pseudomonas; MRSA PCR negative but staph positive.   - LINES: R femoral shiley placed 3/9, an, peripheral IV access      GASTROINTESTINAL  - OG tube removed, pt on diet  - last bm: today am      RENAL  - worsening renal failure on CKD stage IV. Cr 5.49 (4.46 on admission march 4; 3.07 a year ago); BUN improved to 31.  - volume status: volume overloaded with bilateral UE and LE pitting edema, improving after 3 sessions HD with 5L total removed up to date    - monitor electrolytes  - nephrology following, shiley placed 3/9, s/p HD 3/9 (2.5L removed) and 3/10 (2.5L removed) and 3/11 (2L removed). f/u recs - per 3/12 nephro recs plan to remove shiley and consult vascular surgery for permcath placement    ENDOCRINOLOGY  - glc stable at 95. on tube feeds, CTM.   - f/u a1c  - SSI as was concern of potential DM    HEMATOLOGY  - hb stable at 9-10; plt stable at 160s.   - no active signs of bleeding    ETHICS  - discussion with spouse 3/9: pt is FULL CODE.             FOR FOLLOW UP:  [ ] f/u nephrology recs - plan for shiley removal and vascular surgery consult for permcath placement   [ ] sleep study outpt as strong suspicion ARIELLE leading to hypercapnia  [ ] c/w zosyn 5 days total (3/10--3/14) for pneumonia (febrile)  [ ] cardiovascular - nephrology mentions possible cath in note - f/u MICU Transfer Note    Transfer from: MICU    Transfer to: ( X ) Medicine    (  ) Telemetry     (   ) RCU        (    ) Palliative         (   ) Stroke Unit          (   ) __________________    Accepting Physician: Melvin Murray   Signout given to:     MICU COURSE:    Pt is a 79 y/o with hx HTN, T2DM? (last a1c 5.8), worsening Acute Renal Failure superimposed on CKD IV with Chronic HFpEF 60%, admitted 3/4/21 for oliguric renal failure and CHF exacerbation intubated 3/9 for lethargy/airway protection and transferred to MICU.       He was intubated 3/9 for lethargy/airway protection on floor during RRT. CTH without acute changes. He was on sedation. Volume overload thought to be 2/2 renal failure and contributing HF exacerbation. Bedside POCUS without worsening systolic function but shows LVOT obstruction. He was on bumex gtt on floors for volume overload. Diuretics discontinued, R femoral shiley placed, and s/p 3 sessions HD (2.5L removed first 2 sessions, 2L removed 3rd session) - being careful with fluid removal to avoid too much preload reduction with LVOT obstruction. Home HF and HTN meds can be restarted. Nephrology following for future HD sessions.   He has a lot of secretions, needs frequent suctioning. POCUS with bilateral pleural effusion. He became febrile and given LLL consolidation on CXR, thought to be 2/2 pneumonia. BCx NGTD x2 3/10 and sputum culture 3/10 with few pseudomonas. On vanc zosyn, vanc d/c as MRSA nasal swab negative. Will continue zosyn for 5 days.   He was extubated 3/11 pm successfully, A&O x3 and responding coherently. OG tube removed 3/11 pm and diet started. Having daily bm.                 ASSESSMENT & PLAN:    Pt is a 79 y/o with hx HTN, T2DM? (last a1c 5.8), worsening Acute Renal Failure superimposed on CKD IV with Chronic HFpEF 60%, admitted 3/4/21 for oliguric renal failure and CHF exacerbation intubated 3/9 for lethargy/airway protection and transferred to MICU s/p 3 sessions HD, extubated 3/11.     NEURO  - pt is intubated since 3/9 for lethargy/airway protection -> extubated 3/11 pm.   - sedation: off sedation  - mental status: A&O x3, responds coherently to all questions  - CTH 3/9: no acute changes      CARDIOVASCULAR  - admitted to hospital with concern for CHF exacerbation (elevated BNP ~30,000, volume overloaded on exam): Echo from 3/5/2021: EF 60%, moderate severe LVOT obstruction, mild segmental LV systolic dysfunction, TTE 03/12 EF > 75%, markedly increased intraventricular gradient  - was on bumex gtt on medicine floor; now getting HD via shiley, off diuretics.   - will avoid reducing preload too much with HD given LVOT obstruction  - at home for HTN and heart failure: nifedipine 90mg PO daily, coreg 25mg PO BID, Hydralazine 100mg q8h--- will restart home meds as tolerated.       PULMONARY  - s/p intubation 3/9 for hypercapnic respiratory failure, lethargy, airway protection -> extubated 3/11 pm  - suspect ARIELLE given hypercapnia on admission, body habitus, recommend sleep study outpt  - on bedside US, has bilateral pleural effusion (R>L)  - CXR with consolidation LLL - atelectasis vs pneumonia. bilateral pleural effusions.   - Sputum Cx 03/10 pseudomonas     ABG - ( 12 Mar 2021 00:29 )  pH, Arterial: 7.42  pH, Blood: x     /  pCO2: 46    /  pO2: 106   / HCO3: 30    / Base Excess: 4.9   /  SaO2: 98            INFECTIOUS DISEASE   - intermittent fevers since 3/10 pm, CXR with LLL consolidation concerning for pneumonia (CXR on admission to hospital did not have this). started on vanc by level and zosyn for empiric coverage 3/10 plan to continue zosyn for 5 days. vanc discontinued as MRSA negative on nasal swab.   - cultures: BCx 3/10 x2 NGTD; sputum culture 3/10 - few pseudomonas; MRSA PCR negative but staph positive, Sputum Cx 03/10 pseudomonas   - LINES: R femoral shiley placed 3/9, an, peripheral IV access      GASTROINTESTINAL  - OG tube removed, pt on diet  - last bm: today am      RENAL  - worsening renal failure on CKD stage IV. Cr 5.49 (4.46 on admission march 4; 3.07 a year ago); BUN improved to 31.  - volume status: volume overloaded with bilateral UE and LE pitting edema, improving after 3 sessions HD with 5L total removed up to date    - monitor electrolytes  - nephrology following, shiley placed 3/9, s/p HD 3/9 (2.5L removed) and 3/10 (2.5L removed) and 3/11 (2L removed). f/u recs - per 3/12 nephro recs plan to remove shiley and consult vascular surgery for permcath placement    ENDOCRINOLOGY  - glc stable at 95. on tube feeds, CTM.   - f/u a1c  - SSI as was concern of potential DM    HEMATOLOGY  - hb stable at 9-10; plt stable at 160s.   - no active signs of bleeding    ETHICS  - discussion with spouse 3/9: pt is FULL CODE.             FOR FOLLOW UP:  [ ] f/u nephrology recs - plan for shiley removal and vascular surgery consult for permcath placement   [ ] sleep study outpt as strong suspicion ARIELLE leading to hypercapnia  [ ] c/w zosyn 5 days total (3/10--3/14) for pneumonia (febrile)  [ ] cardiovascular - nephrology mentions possible cath in note - f/u MICU Transfer Note    Transfer from: MICU    Transfer to: ( X ) Medicine    (  ) Telemetry     (   ) RCU        (    ) Palliative         (   ) Stroke Unit          (   ) __________________    Accepting Physician: Melvin Murray   Signout given to:     MICU COURSE:    Pt is a 81 y/o with hx HTN, T2DM? (last a1c 5.8), worsening Acute Renal Failure superimposed on CKD IV with Chronic HFpEF 60%, admitted 3/4/21 for oliguric renal failure and CHF exacerbation intubated 3/9 for lethargy/airway protection and transferred to MICU.       He was intubated 3/9 for lethargy/airway protection on floor during RRT. CTH without acute changes. He was on sedation. Volume overload thought to be 2/2 renal failure and contributing HF exacerbation. Bedside POCUS without worsening systolic function but shows LVOT obstruction. He was on bumex gtt on floors for volume overload. Diuretics discontinued, R femoral shiley placed, and s/p 3 sessions HD (2.5L removed first 2 sessions, 2L removed 3rd session) - being careful with fluid removal to avoid too much preload reduction with LVOT obstruction. Home HF and HTN meds can be restarted. Nephrology following for future HD sessions.   He has a lot of secretions, needs frequent suctioning. POCUS with bilateral pleural effusion. He became febrile and given LLL consolidation on CXR, thought to be 2/2 pneumonia. BCx NGTD x2 3/10 and sputum culture 3/10 with few pseudomonas. On vanc zosyn, vanc d/c as MRSA nasal swab negative. Will continue zosyn for 5 days.   He was extubated 3/11 pm successfully, A&O x3 and responding coherently. OG tube removed 3/11 pm and diet started. Having daily bm.                 ASSESSMENT & PLAN:    Pt is a 81 y/o with hx HTN, T2DM? (last a1c 5.8), worsening Acute Renal Failure superimposed on CKD IV with Chronic HFpEF 60%, admitted 3/4/21 for oliguric renal failure and CHF exacerbation intubated 3/9 for lethargy/airway protection and transferred to MICU s/p 3 sessions HD, extubated 3/11.     NEURO  - pt is intubated since 3/9 for lethargy/airway protection -> extubated 3/11 pm.   - sedation: off sedation  - mental status: A&O x3, responds coherently to all questions  - CTH 3/9: no acute changes      CARDIOVASCULAR  - admitted to hospital with concern for CHF exacerbation (elevated BNP ~30,000, volume overloaded on exam): Echo from 3/5/2021: EF 60%, moderate severe LVOT obstruction, mild segmental LV systolic dysfunction, TTE 03/12 EF > 75%, markedly increased intraventricular gradient  - was on bumex gtt on medicine floor; now getting HD via shiley, off diuretics.   - will avoid reducing preload too much with HD given LVOT obstruction  - at home for HTN and heart failure: nifedipine 90mg PO daily, coreg 25mg PO BID, Hydralazine 100mg q8h--- will restart home meds as tolerated.       PULMONARY  - s/p intubation 3/9 for hypercapnic respiratory failure, lethargy, airway protection -> extubated 3/11 pm  - suspect ARIELLE given hypercapnia on admission, body habitus, recommend sleep study outpt  - on bedside US, has bilateral pleural effusion (R>L)  - CXR with consolidation LLL - atelectasis vs pneumonia. bilateral pleural effusions.   - Sputum Cx 03/10 pseudomonas     ABG - ( 12 Mar 2021 00:29 )  pH, Arterial: 7.42  pH, Blood: x     /  pCO2: 46    /  pO2: 106   / HCO3: 30    / Base Excess: 4.9   /  SaO2: 98            INFECTIOUS DISEASE   - intermittent fevers since 3/10 pm, CXR with LLL consolidation concerning for pneumonia (CXR on admission to hospital did not have this). started on vanc by level and zosyn for empiric coverage 3/10 plan to continue zosyn for 5 days. vanc discontinued as MRSA negative on nasal swab.   - cultures: BCx 3/10 x2 NGTD; sputum culture 3/10 - few pseudomonas; MRSA PCR negative but staph positive, Sputum Cx 03/10 pseudomonas   - LINES: R femoral shiley placed 3/9, an, peripheral IV access      GASTROINTESTINAL  - OG tube removed, pt on diet  - last bm: today am      RENAL  - worsening renal failure on CKD stage IV. Cr 5.49 (4.46 on admission march 4; 3.07 a year ago); BUN improved to 31.  - volume status: volume overloaded with bilateral UE and LE pitting edema, improving after 3 sessions HD with 5L total removed up to date    - monitor electrolytes  - nephrology following, shiley placed 3/9, s/p HD 3/9 (2.5L removed) and 3/10 (2.5L removed) and 3/11 (2L removed). f/u recs - per 3/12 nephro recs plan to remove shiley and consult vascular surgery for permcath placement    ENDOCRINOLOGY  - glc stable at 95. on tube feeds, CTM.   - f/u a1c  - SSI as was concern of potential DM    HEMATOLOGY  - hb stable at 9-10; plt stable at 160s.   - no active signs of bleeding    ETHICS  - discussion with spouse 3/9: pt is FULL CODE.             FOR FOLLOW UP:  [ ] f/u nephrology recs - plan for shiley removal and vascular surgery consult for permcath placement   [ ] monitor pCO2 (needed bipap overnight)  [ ] sleep study outpt as strong suspicion ARIELLE leading to hypercapnia  [ ] c/w zosyn 5 days total (3/10--3/14) for pneumonia (febrile)  [ ] cardiovascular - nephrology mentions possible cath in note - f/u none known

## 2021-03-22 ENCOUNTER — APPOINTMENT (OUTPATIENT)
Dept: PLASTIC SURGERY | Facility: CLINIC | Age: 51
End: 2021-03-22
Payer: MEDICAID

## 2021-03-22 VITALS
HEART RATE: 72 BPM | SYSTOLIC BLOOD PRESSURE: 131 MMHG | TEMPERATURE: 98.3 F | WEIGHT: 221 LBS | DIASTOLIC BLOOD PRESSURE: 83 MMHG | BODY MASS INDEX: 31.64 KG/M2 | HEIGHT: 70 IN | OXYGEN SATURATION: 96 %

## 2021-03-22 PROCEDURE — 99024 POSTOP FOLLOW-UP VISIT: CPT

## 2021-03-22 NOTE — HISTORY OF PRESENT ILLNESS
[FreeTextEntry1] : 50 year old male who presents for a post op visit s/p wide excisional biopsy of left volar wrist mass DOS: 02/22/21.\par Pt is doing better with time.

## 2021-03-22 NOTE — ASSESSMENT
[FreeTextEntry1] : 50 year old male who presents for a post op visit s/p wide excisional biopsy of left volar wrist mass DOS: 02/22/21. Pt reports swelling at surgical site, and a new complaint of right upper arm mass.\par \par - Instructed to start massaging the area daily\par - Follow up with Dr Sal regarding right upper arm mass\par - F/u in 2 weeks to re-assess.

## 2021-03-26 NOTE — HISTORY OF PRESENT ILLNESS
[de-identified] : ***REFERRED FROM [facility]\par \par 49-year-old man who February 2017 had a resection of a 6 cm schwannoma from the posterior LEFT THIGH performed by me, in conjunction with neurosurgery (Dr. George HERNANDES), with reconstruction by Dr. Jensen Nguyen.\par Postoperative course was prolonged due to severe constipation from overuse of narcotic analgesics, requiring readmission.\par \par Eventually, recuperated completely and has been followed carefully clinically.\par \par Initial presentation to the system was in November 2016 to general surgery clinic with a 7-8 year history of an enlarging mass in the posterior left thigh.\par \par Summer 2020 episode of shingles.\par \par Spring 2020, son in [place] dx'd and being treated for breast cancer.\par \par ~January 2018 (cannot specify duration) he noticed a nodule on the inner aspect of his right leg. 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 Nguyen) demonstrated dermatofibroma, with microscopic involvement of the margins.\par August 2018 reexcision (By me, with reconstruction by Dr. Nguyen) of pain negative margins.\par \par \par July 2019 he had excision of a left wrist ganglion cyst by Dr. Jensen Nguyen.\par \par \par \par His internist is Dr. Slava THOMPSON.\par He has no significant past medical history.\par He takes no regular medications

## 2021-03-26 NOTE — REASON FOR VISIT
[Follow-Up Visit] : a follow-up visit for [Other: _____] : [unfilled] [FreeTextEntry2] : Left thigh schwannoma

## 2021-04-08 ENCOUNTER — APPOINTMENT (OUTPATIENT)
Dept: SURGICAL ONCOLOGY | Facility: CLINIC | Age: 51
End: 2021-04-08
Payer: MEDICAID

## 2021-04-08 VITALS
HEART RATE: 79 BPM | HEIGHT: 70 IN | SYSTOLIC BLOOD PRESSURE: 165 MMHG | OXYGEN SATURATION: 98 % | DIASTOLIC BLOOD PRESSURE: 89 MMHG

## 2021-04-08 DIAGNOSIS — R22.0 LOCALIZED SWELLING, MASS AND LUMP, HEAD: ICD-10-CM

## 2021-04-08 PROCEDURE — 99072 ADDL SUPL MATRL&STAF TM PHE: CPT

## 2021-04-08 PROCEDURE — 99215 OFFICE O/P EST HI 40 MIN: CPT

## 2021-04-09 ENCOUNTER — NON-APPOINTMENT (OUTPATIENT)
Age: 51
End: 2021-04-09

## 2021-04-12 ENCOUNTER — APPOINTMENT (OUTPATIENT)
Dept: INTERNAL MEDICINE | Facility: CLINIC | Age: 51
End: 2021-04-12

## 2021-04-15 ENCOUNTER — RESULT REVIEW (OUTPATIENT)
Age: 51
End: 2021-04-15

## 2021-04-15 ENCOUNTER — APPOINTMENT (OUTPATIENT)
Dept: PLASTIC SURGERY | Facility: CLINIC | Age: 51
End: 2021-04-15

## 2021-04-20 ENCOUNTER — APPOINTMENT (OUTPATIENT)
Dept: ULTRASOUND IMAGING | Facility: CLINIC | Age: 51
End: 2021-04-20
Payer: MEDICAID

## 2021-04-20 ENCOUNTER — APPOINTMENT (OUTPATIENT)
Dept: RADIOLOGY | Facility: CLINIC | Age: 51
End: 2021-04-20
Payer: MEDICAID

## 2021-04-20 ENCOUNTER — OUTPATIENT (OUTPATIENT)
Dept: OUTPATIENT SERVICES | Facility: HOSPITAL | Age: 51
LOS: 1 days | End: 2021-04-20
Payer: MEDICAID

## 2021-04-20 DIAGNOSIS — Z98.890 OTHER SPECIFIED POSTPROCEDURAL STATES: Chronic | ICD-10-CM

## 2021-04-20 DIAGNOSIS — Z00.8 ENCOUNTER FOR OTHER GENERAL EXAMINATION: ICD-10-CM

## 2021-04-20 DIAGNOSIS — M67.432 GANGLION, LEFT WRIST: Chronic | ICD-10-CM

## 2021-04-20 DIAGNOSIS — R22.30 LOCALIZED SWELLING, MASS AND LUMP, UNSPECIFIED UPPER LIMB: ICD-10-CM

## 2021-04-20 PROCEDURE — 76536 US EXAM OF HEAD AND NECK: CPT

## 2021-04-20 PROCEDURE — 76882 US LMTD JT/FCL EVL NVASC XTR: CPT

## 2021-04-20 PROCEDURE — 73030 X-RAY EXAM OF SHOULDER: CPT

## 2021-04-20 PROCEDURE — 76882 US LMTD JT/FCL EVL NVASC XTR: CPT | Mod: 26,RT

## 2021-04-20 PROCEDURE — 76536 US EXAM OF HEAD AND NECK: CPT | Mod: 26

## 2021-04-20 PROCEDURE — 73030 X-RAY EXAM OF SHOULDER: CPT | Mod: 26,RT

## 2021-04-27 ENCOUNTER — APPOINTMENT (OUTPATIENT)
Dept: PLASTIC SURGERY | Facility: CLINIC | Age: 51
End: 2021-04-27

## 2021-04-28 ENCOUNTER — OUTPATIENT (OUTPATIENT)
Dept: OUTPATIENT SERVICES | Facility: HOSPITAL | Age: 51
LOS: 1 days | End: 2021-04-28

## 2021-04-28 VITALS
RESPIRATION RATE: 16 BRPM | SYSTOLIC BLOOD PRESSURE: 110 MMHG | OXYGEN SATURATION: 98 % | HEIGHT: 70 IN | DIASTOLIC BLOOD PRESSURE: 80 MMHG | WEIGHT: 218.92 LBS | TEMPERATURE: 99 F | HEART RATE: 84 BPM

## 2021-04-28 DIAGNOSIS — Z98.890 OTHER SPECIFIED POSTPROCEDURAL STATES: Chronic | ICD-10-CM

## 2021-04-28 DIAGNOSIS — R22.30 LOCALIZED SWELLING, MASS AND LUMP, UNSPECIFIED UPPER LIMB: ICD-10-CM

## 2021-04-28 DIAGNOSIS — M67.432 GANGLION, LEFT WRIST: Chronic | ICD-10-CM

## 2021-04-28 DIAGNOSIS — R22.31 LOCALIZED SWELLING, MASS AND LUMP, RIGHT UPPER LIMB: ICD-10-CM

## 2021-04-28 LAB
ANION GAP SERPL CALC-SCNC: 16 MMOL/L — HIGH (ref 7–14)
BUN SERPL-MCNC: 21 MG/DL — SIGNIFICANT CHANGE UP (ref 7–23)
CALCIUM SERPL-MCNC: 9.5 MG/DL — SIGNIFICANT CHANGE UP (ref 8.4–10.5)
CHLORIDE SERPL-SCNC: 101 MMOL/L — SIGNIFICANT CHANGE UP (ref 98–107)
CO2 SERPL-SCNC: 25 MMOL/L — SIGNIFICANT CHANGE UP (ref 22–31)
CREAT SERPL-MCNC: 1.16 MG/DL — SIGNIFICANT CHANGE UP (ref 0.5–1.3)
GLUCOSE SERPL-MCNC: 88 MG/DL — SIGNIFICANT CHANGE UP (ref 70–99)
HCT VFR BLD CALC: 45.7 % — SIGNIFICANT CHANGE UP (ref 39–50)
HGB BLD-MCNC: 15.6 G/DL — SIGNIFICANT CHANGE UP (ref 13–17)
MCHC RBC-ENTMCNC: 30.7 PG — SIGNIFICANT CHANGE UP (ref 27–34)
MCHC RBC-ENTMCNC: 34.1 GM/DL — SIGNIFICANT CHANGE UP (ref 32–36)
MCV RBC AUTO: 90 FL — SIGNIFICANT CHANGE UP (ref 80–100)
NRBC # BLD: 0 /100 WBCS — SIGNIFICANT CHANGE UP
NRBC # FLD: 0 K/UL — SIGNIFICANT CHANGE UP
PLATELET # BLD AUTO: 362 K/UL — SIGNIFICANT CHANGE UP (ref 150–400)
POTASSIUM SERPL-MCNC: 3.7 MMOL/L — SIGNIFICANT CHANGE UP (ref 3.5–5.3)
POTASSIUM SERPL-SCNC: 3.7 MMOL/L — SIGNIFICANT CHANGE UP (ref 3.5–5.3)
RBC # BLD: 5.08 M/UL — SIGNIFICANT CHANGE UP (ref 4.2–5.8)
RBC # FLD: 12 % — SIGNIFICANT CHANGE UP (ref 10.3–14.5)
SODIUM SERPL-SCNC: 142 MMOL/L — SIGNIFICANT CHANGE UP (ref 135–145)
WBC # BLD: 6.66 K/UL — SIGNIFICANT CHANGE UP (ref 3.8–10.5)
WBC # FLD AUTO: 6.66 K/UL — SIGNIFICANT CHANGE UP (ref 3.8–10.5)

## 2021-04-28 RX ORDER — SODIUM CHLORIDE 9 MG/ML
1000 INJECTION, SOLUTION INTRAVENOUS
Refills: 0 | Status: DISCONTINUED | OUTPATIENT
Start: 2021-05-04 | End: 2021-05-18

## 2021-04-28 NOTE — H&P PST ADULT - MUSCULOSKELETAL COMMENTS
Pt s/p left wrist surgery for carpal tunnel 2/21 - pt states healing well and denies pain or numbness in hand Pt s/p left wrist surgery for ganglion cyst 2/21 - pt states healing well and denies pain or numbness in hand

## 2021-04-28 NOTE — H&P PST ADULT - ATTENDING COMMENTS
50-year-old man with 2 soft tissue masses on his posterior right arm, scheduled for excision, with plastics closure is by Dr. Jensen Nguyen.    Discussed with him in my office, and again the morning of operation.    All questions answered, consent on chart

## 2021-04-28 NOTE — H&P PST ADULT - CARDIOVASCULAR COMMENTS
Pt seen by Cardio for evaluation of chest discomfort 3/21 - Echo and Stress done - all normal - pt has had increased family and work stress recently - no recurrence of discomfort or SOB

## 2021-04-28 NOTE — H&P PST ADULT - SKIN/BREAST COMMENTS
Pt c/o of discomfort from 2 lumps upper lateral aspect arm - one first noted  3 yrs ago and now increased in size - the 2nd developed 6 months ago Pt c/o of discomfort from  2 masses right upper arm posterior aspect - one first noted  3 yrs ago and now increased in size - the 2nd developed 6 months ago- tested indicated lipomas

## 2021-04-28 NOTE — H&P PST ADULT - NSICDXPASTSURGICALHX_GEN_ALL_CORE_FT
PAST SURGICAL HISTORY:  Ganglion cyst of wrist, left excision 2019    H/O excision of mass left thigh benign    History of drainage of abscess 2012 anorectal    History of excision of mass right leg 6/2018    S/P colonoscopy 2012    S/P hernia repair      PAST SURGICAL HISTORY:  Ganglion cyst of wrist, left excision 2019    H/O excision of mass left thigh benign    History of drainage of abscess 2012 anorectal    History of excision of mass right leg 6/2018    S/P colonoscopy 2012    S/P hernia repair umbilical - mesh

## 2021-04-28 NOTE — H&P PST ADULT - NSICDXPASTMEDICALHX_GEN_ALL_CORE_FT
PAST MEDICAL HISTORY:  Anorectal abscess     Ganglion cyst of wrist     History of Clostridium difficile colitis 2012    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      PAST MEDICAL HISTORY:  Anorectal abscess     Ganglion cyst of wrist     History of Clostridium difficile colitis 2012    Mass of arm, right x2 - right upper arm    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

## 2021-04-28 NOTE — H&P PST ADULT - HISTORY OF PRESENT ILLNESS
51 y/o male with hx of left wrist ganglion cyst, s/p excision 2019.  Pt reports mass returned.  now scheduled for Excisional biopsy left wrist mass. possible local flap Pt is a 50 yr old male scheduled for Excision RIght Arm Mass x2 with Dr Sal tentatively 5/4/21 - pt s/p surgery for left ganglion cyst 2/21 and has had good postop course. Pt now to have 2 masses removed - 1 noted 3 yrs ago and now increased in size and causing discomfort and the 2nd noted 6 months ago - Both on upper posterior aspect Right upper arm.   Pt denies COVID   Pt has had 1st of 2 vaccines - Moderna   Pt to have COVID preop test

## 2021-04-28 NOTE — H&P PST ADULT - NSICDXPROBLEM_GEN_ALL_CORE_FT
PROBLEM DIAGNOSES  Problem: Localized swelling, mass and lump, upper limb, right  Assessment and Plan: Pt scheduled for surgery and preop instructions including instructions for taking Famotidine and for Chlorhexidine use in showering on the day of surgery, given verbally and with use of  written materials, and patient confirming understanding of such instructions using  teach back   method.  Pt to see PCP for [facility] as per surgeon - forms given

## 2021-04-29 ENCOUNTER — NON-APPOINTMENT (OUTPATIENT)
Age: 51
End: 2021-04-29

## 2021-04-29 ENCOUNTER — APPOINTMENT (OUTPATIENT)
Dept: INTERNAL MEDICINE | Facility: CLINIC | Age: 51
End: 2021-04-29
Payer: MEDICAID

## 2021-04-29 ENCOUNTER — OUTPATIENT (OUTPATIENT)
Dept: OUTPATIENT SERVICES | Facility: HOSPITAL | Age: 51
LOS: 1 days | End: 2021-04-29
Payer: MEDICAID

## 2021-04-29 VITALS
WEIGHT: 221 LBS | SYSTOLIC BLOOD PRESSURE: 110 MMHG | BODY MASS INDEX: 31.64 KG/M2 | DIASTOLIC BLOOD PRESSURE: 70 MMHG | HEIGHT: 70 IN

## 2021-04-29 DIAGNOSIS — Z98.890 OTHER SPECIFIED POSTPROCEDURAL STATES: Chronic | ICD-10-CM

## 2021-04-29 DIAGNOSIS — M67.432 GANGLION, LEFT WRIST: Chronic | ICD-10-CM

## 2021-04-29 DIAGNOSIS — I10 ESSENTIAL (PRIMARY) HYPERTENSION: ICD-10-CM

## 2021-04-29 PROCEDURE — 99213 OFFICE O/P EST LOW 20 MIN: CPT | Mod: GE

## 2021-04-29 PROCEDURE — G0463: CPT

## 2021-04-30 ENCOUNTER — APPOINTMENT (OUTPATIENT)
Dept: PLASTIC SURGERY | Facility: CLINIC | Age: 51
End: 2021-04-30

## 2021-04-30 DIAGNOSIS — Z01.818 ENCOUNTER FOR OTHER PREPROCEDURAL EXAMINATION: ICD-10-CM

## 2021-04-30 PROBLEM — R22.31 LOCALIZED SWELLING, MASS AND LUMP, RIGHT UPPER LIMB: Chronic | Status: ACTIVE | Noted: 2021-04-28

## 2021-04-30 NOTE — PHYSICAL EXAM
[No Acute Distress] : no acute distress [Well Nourished] : well nourished [Well Developed] : well developed [Well-Appearing] : well-appearing [No Respiratory Distress] : no respiratory distress  [No Accessory Muscle Use] : no accessory muscle use [Clear to Auscultation] : lungs were clear to auscultation bilaterally [Normal Rate] : normal rate  [Regular Rhythm] : with a regular rhythm [Normal S1, S2] : normal S1 and S2 [No Murmur] : no murmur heard [No Edema] : there was no peripheral edema [Soft] : abdomen soft [Non Tender] : non-tender [Non-distended] : non-distended [No HSM] : no HSM [Normal Bowel Sounds] : normal bowel sounds [Normal Affect] : the affect was normal [Alert and Oriented x3] : oriented to person, place, and time [de-identified] : Right arm 2 tender mobile compressible lesions

## 2021-04-30 NOTE — HISTORY OF PRESENT ILLNESS
[(Patient denies any chest pain, claudication, dyspnea on exertion, orthopnea, palpitations or syncope)] : Patient denies any chest pain, claudication, dyspnea on exertion, orthopnea, palpitations or syncope [Excellent (>10 METs)] : Excellent (>10 METs) [Aortic Stenosis] : no aortic stenosis [Atrial Fibrillation] : no atrial fibrillation [Coronary Artery Disease] : no coronary artery disease [Recent Myocardial Infarction] : no recent myocardial infarction [Implantable Device/Pacemaker] : no implantable device/pacemaker [Asthma] : no asthma [COPD] : no COPD [Sleep Apnea] : no sleep apnea [Smoker] : not a smoker [Family Member] : no family member with adverse anesthesia reaction/sudden death [Self] : no previous adverse anesthesia reaction [Chronic Anticoagulation] : no chronic anticoagulation [Chronic Kidney Disease] : no chronic kidney disease [Diabetes] : no diabetes [FreeTextEntry1] : Lipoma removal x 2 R arm  [FreeTextEntry3] : Dr. Nguyen  [FreeTextEntry2] : 5/4/21 [FreeTextEntry4] : 49 yo male with hx of L. thigh schwannoma and R. leg dermatofibroma s/p resections here for medical clearance. Has 2 right arm lipomas that cause some mild tenderness.  [FreeTextEntry7] : EKG in March 2021 NSR

## 2021-04-30 NOTE — REVIEW OF SYSTEMS
[Fever] : no fever [Chills] : no chills [Chest Pain] : no chest pain [Palpitations] : no palpitations [Lower Ext Edema] : no lower extremity edema [Orthopena] : no orthopnea [Shortness Of Breath] : no shortness of breath [Wheezing] : no wheezing [Cough] : no cough [Abdominal Pain] : no abdominal pain [Nausea] : no nausea [Vomiting] : no vomiting [Dysuria] : no dysuria

## 2021-04-30 NOTE — ASSESSMENT
[High Risk Surgery - Intraperitoneal, Intrathoracic or Supringuinal Vascular Procedures] : High Risk Surgery - Intraperitoneal, Intrathoracic or Supringuinal Vascular Procedures - No (0) [Ischemic Heart Disease] : Ischemic Heart Disease - No (0) [Congestive Heart Failure] : Congestive Heart Failure - No (0) [Prior Cerebrovascular Accident or TIA] : Prior Cerebrovascular Accident or TIA - No (0) [Creatinine >= 2mg/dL (1 Point)] : Creatinine >= 2mg/dL - No (0) [Insulin-dependent Diabetic (1 Point)] : Insulin-dependent Diabetic - No (0) [0] : 0 , RCRI Class: I, Risk of Post-Op Cardiac Complications: 3.9%, 95% CI for Risk Estimate: 2.8% - 5.4% [Patient Optimized for Surgery] : Patient optimized for surgery [FreeTextEntry4] : P stated

## 2021-05-01 ENCOUNTER — APPOINTMENT (OUTPATIENT)
Dept: DISASTER EMERGENCY | Facility: CLINIC | Age: 51
End: 2021-05-01

## 2021-05-01 LAB — SARS-COV-2 N GENE NPH QL NAA+PROBE: NOT DETECTED

## 2021-05-01 NOTE — ASU PATIENT PROFILE, ADULT - BILL OF RIGHTS/ADMISSION INFORMATION PROVIDED TO:
. Right upper extremity elbow/brachial region 2 x 2 undermining 12-12 -  4.5 x 7.5cm, erythema, edema, TTP thick sanguinous fluid, erythema, TTP, edema: no odor
Right Elbow-Right upper extremity elbow/brachial region 2 x 2 undermining 12-12 -  4.5 x 7.5cm, erythema, edema, TTP thick sanguinous fluid, erythema, TTP, edema: no odor
Patient

## 2021-05-03 ENCOUNTER — TRANSCRIPTION ENCOUNTER (OUTPATIENT)
Age: 51
End: 2021-05-03

## 2021-05-03 NOTE — ASU PATIENT PROFILE, ADULT - PSH
Ganglion cyst of wrist, left  excision 2019  H/O excision of mass  left thigh benign  History of drainage of abscess  2012 anorectal  History of excision of mass  right leg 6/2018  S/P colonoscopy  2012  S/P hernia repair  umbilical - mesh

## 2021-05-03 NOTE — ASU DISCHARGE PLAN (ADULT/PEDIATRIC) - PAIN MANAGEMENT
OTC w/ oxycodone for breakthrough/Take over the counter pain medication/Prescriptions electronically submitted to pharmacy from [facility]

## 2021-05-03 NOTE — ASU DISCHARGE PLAN (ADULT/PEDIATRIC) - MEDICATION INSTRUCTIONS
OTC w/ oxycodone for breakthrough OTC w/ oxycodone for breakthrough. Please take stool softener if using narcotics for pain. OTC is okay if patient already owns. Prescription written if not.

## 2021-05-03 NOTE — ASU PATIENT PROFILE, ADULT - PMH
Anorectal abscess    Ganglion cyst of wrist    History of Clostridium difficile colitis  2012  Mass of arm, right  x2 - right upper arm  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

## 2021-05-03 NOTE — ASU DISCHARGE PLAN (ADULT/PEDIATRIC) - ASU DC SPECIAL INSTRUCTIONSFT
Initial followup with plastic surgery in the next 5-15 days, regarding matters of bandages, activities, and showering.    Dr. Sal should call with pathology report in approximately 2 weeks.  The conversation will determine further management. Initial followup with plastic surgery in the next 5-15 days, regarding matters of bandages, activities, and showering. Please follow up with Dr. Nguyen within x1 week after discharge from the hospital. You may call [phone] to schedule an appointment.  Please wait 24 hours to shower. Dressings remain on, pat dry after shower.      Dr. Sal should call with pathology report in approximately 2 weeks.  The conversation will determine further management.

## 2021-05-03 NOTE — ASU PATIENT PROFILE, ADULT - NS TRANSFER EYEGLASSES PAIRS
September 17, 2020       Dyan Nix  [address] [facility] [id number]      Dear Dyan      This letter is to confirm your procedure with Dr. Brantley on 10/19/20, Your arrival time is 10:30 a.m.. Please review the enclosed prep instructions for your procedure.    If you have any questions regarding these instructions or need to reschedule please call the office at  [phone].       Sincerely,      Jenny       Your procedure will be done at the hospital below.     [x]  [facility], [address], [facility]  [id number]       4th Floor GI Lab. Take elevator left of [employer] (south elevator).       Turn right off of elevator on 4th floor. Door in front of you        GI Lab.  Covid test scheduled on 10/16/20  at  11:15 a.m.    1 pair

## 2021-05-04 ENCOUNTER — RESULT REVIEW (OUTPATIENT)
Age: 51
End: 2021-05-04

## 2021-05-04 ENCOUNTER — APPOINTMENT (OUTPATIENT)
Dept: SURGICAL ONCOLOGY | Facility: HOSPITAL | Age: 51
End: 2021-05-04
Payer: MEDICAID

## 2021-05-04 ENCOUNTER — OUTPATIENT (OUTPATIENT)
Dept: OUTPATIENT SERVICES | Facility: HOSPITAL | Age: 51
LOS: 1 days | Discharge: HOME | End: 2021-05-04
Payer: MEDICAID

## 2021-05-04 ENCOUNTER — APPOINTMENT (OUTPATIENT)
Dept: PLASTIC SURGERY | Facility: HOSPITAL | Age: 51
End: 2021-05-04
Payer: MEDICAID

## 2021-05-04 VITALS
SYSTOLIC BLOOD PRESSURE: 109 MMHG | DIASTOLIC BLOOD PRESSURE: 68 MMHG | OXYGEN SATURATION: 97 % | HEART RATE: 72 BPM | RESPIRATION RATE: 15 BRPM

## 2021-05-04 VITALS
HEART RATE: 63 BPM | OXYGEN SATURATION: 96 % | DIASTOLIC BLOOD PRESSURE: 89 MMHG | RESPIRATION RATE: 16 BRPM | TEMPERATURE: 98 F | WEIGHT: 218.04 LBS | SYSTOLIC BLOOD PRESSURE: 130 MMHG | HEIGHT: 70 IN

## 2021-05-04 DIAGNOSIS — Z98.890 OTHER SPECIFIED POSTPROCEDURAL STATES: Chronic | ICD-10-CM

## 2021-05-04 DIAGNOSIS — M67.432 GANGLION, LEFT WRIST: Chronic | ICD-10-CM

## 2021-05-04 DIAGNOSIS — R22.30 LOCALIZED SWELLING, MASS AND LUMP, UNSPECIFIED UPPER LIMB: ICD-10-CM

## 2021-05-04 PROCEDURE — 14301 TIS TRNFR ANY 30.1-60 SQ CM: CPT | Mod: 79

## 2021-05-04 PROCEDURE — 24071 EXC ARM/ELBOW LES SC 3 CM/>: CPT | Mod: RT

## 2021-05-04 PROCEDURE — 88305 TISSUE EXAM BY PATHOLOGIST: CPT | Mod: 26

## 2021-05-04 RX ORDER — OXYCODONE HYDROCHLORIDE 5 MG/1
5 TABLET ORAL ONCE
Refills: 0 | Status: DISCONTINUED | OUTPATIENT
Start: 2021-05-04 | End: 2021-05-04

## 2021-05-04 RX ORDER — ONDANSETRON 8 MG/1
4 TABLET, FILM COATED ORAL ONCE
Refills: 0 | Status: DISCONTINUED | OUTPATIENT
Start: 2021-05-04 | End: 2021-05-18

## 2021-05-04 RX ORDER — FENTANYL CITRATE 50 UG/ML
25 INJECTION INTRAVENOUS
Refills: 0 | Status: DISCONTINUED | OUTPATIENT
Start: 2021-05-04 | End: 2021-05-04

## 2021-05-04 RX ORDER — OXYCODONE HYDROCHLORIDE 5 MG/1
1 TABLET ORAL
Qty: 24 | Refills: 0
Start: 2021-05-04 | End: 2021-05-07

## 2021-05-04 RX ORDER — DOCUSATE SODIUM 100 MG
1 CAPSULE ORAL
Qty: 14 | Refills: 0
Start: 2021-05-04 | End: 2021-05-10

## 2021-05-04 RX ADMIN — FENTANYL CITRATE 25 MICROGRAM(S): 50 INJECTION INTRAVENOUS at 13:53

## 2021-05-04 RX ADMIN — FENTANYL CITRATE 25 MICROGRAM(S): 50 INJECTION INTRAVENOUS at 13:37

## 2021-05-04 RX ADMIN — OXYCODONE HYDROCHLORIDE 5 MILLIGRAM(S): 5 TABLET ORAL at 14:11

## 2021-05-04 NOTE — BRIEF OPERATIVE NOTE - OPERATION/FINDINGS
excision of two masses from upper right arm w/ reconst. excision of two masses from upper right arm w/ reconst. single incision. Closed in layers.

## 2021-05-06 ENCOUNTER — APPOINTMENT (OUTPATIENT)
Dept: PLASTIC SURGERY | Facility: CLINIC | Age: 51
End: 2021-05-06
Payer: MEDICAID

## 2021-05-06 PROCEDURE — 99024 POSTOP FOLLOW-UP VISIT: CPT

## 2021-05-07 DIAGNOSIS — Z01.818 ENCOUNTER FOR OTHER PREPROCEDURAL EXAMINATION: ICD-10-CM

## 2021-05-11 ENCOUNTER — APPOINTMENT (OUTPATIENT)
Dept: PLASTIC SURGERY | Facility: CLINIC | Age: 51
End: 2021-05-11
Payer: MEDICAID

## 2021-05-11 VITALS — TEMPERATURE: 97.6 F | BODY MASS INDEX: 31.64 KG/M2 | HEIGHT: 70 IN | WEIGHT: 221 LBS

## 2021-05-11 LAB — SURGICAL PATHOLOGY STUDY: SIGNIFICANT CHANGE UP

## 2021-05-11 PROCEDURE — 99024 POSTOP FOLLOW-UP VISIT: CPT

## 2021-05-12 NOTE — HISTORY OF PRESENT ILLNESS
[de-identified] : ***Originally REFERRED FROM [facility]\par \par \par 50-year-old gentleman with a history of a schwannoma of his posterior LEFT THIGH (see below), being seen for evaluation of a tender mass of the upper RIGHT ARM.\par He had originally shown this area to me in May 2019.\par At that time it was ~2 cm diameter, subcutaneous in location, smooth, mobile, and nontender.\par \par \par February 2017:\par Resection of a 6 cm schwannoma from the posterior LEFT THIGH, with neurosurgery (Dr. George HERNANDES), and reconstruction by Dr. Jensen Nguyen.\par Postoperative course was prolonged by severe constipation from overuse of narcotic analgesics, requiring readmission.\par \par Eventually, recuperated completely and has been followed carefully clinically.\par Last seen July 2020.\par \par \par November 2016 presented to general surgery clinic with a 7-8 year history of an enlarging mass in the posterior left thigh.\par \par Summer 2020 episode of shingles.\par \par No personal history of malignancy.\par \par + FH:\par Spring 2020, son in [place] dx'd and being treated for breast cancer.\par \par \par ~January 2018 (cannot specify duration) he noticed a nodule on the inner aspect of his right leg. 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 Nguyen) demonstrated dermatofibroma, with microscopic involvement of the margins.\par August 2018 reexcision (By me, with reconstruction by Dr. Nguyen) obtained negative margins.\par \par February 2021:\par Excision of a left wrist mass by Dr. Nguyen.\par Pathology: Mature adipose tissue and fibrous connective tissue with mild edema\par \par July 2019 he had excision of a left wrist ganglion cyst by Dr. Jensen Nguyen.\par \par \par His internist is Dr. Slava THOMPSON.\par \par He has no significant past medical history.\par \par He takes no regular medications

## 2021-05-12 NOTE — ASSESSMENT
[FreeTextEntry1] : Right shoulder presently:\par Where he had a single soft tissue mass, now there are 2.\par Neither is significantly larger than the lump that had been noted in May 2019, but I cannot say with certainty which one was the initial lesion.\par \par Regarding the tender nodule on his right posterior scalp, I cannot tell whether it is related to the soft tissue or underlying bone.\par \par \par I suggested diagnostic imaging consisting of:\par Right shoulder x-ray.\par Right shoulder sonogram.\par Sonogram of the head and neck area for the scalp lesion\par Prescriptions entered.\par \par He will call me 3 to 4 days after the imaging to discuss the results.\par Findings we will determine further management.\par \par Reviewed in detail, all questions answered.\par \par \par \par \par 08-01-20:\par Left lower extremity MRI at 450.\par No change since October 2016.\par Stable mild enlargement of the mid to distal left sciatic nerve at site of prior schwannoma, presumably related to postsurgical changes.\par August 7, 20/20, we spoke.\par Review of the above information.\par We will repeat August 2021\par \par \par 3/26/2021.\par I called the patient but had to leave a message on his mailbox, and home number.\par Earlier this week the patient had seen Dr. Jensen Nguyen.\par The patient apparently has a tender lump on the outer right arm.\par My office will call to schedule a follow-up visit with us.\par \par \par \par 4-26-21.\par We spoke.\par He had imaging at 450 on April 20, 2021.\par 1.  Right parieto-occipital scalp ultrasound:\par Cyst in the superficial soft tissues measuring 7 x 5 x 3 mm.\par The underlying calvarium is intact.\par 2.  Right upper arm ultrasound:\par Two subcutaneous masses (2.8 x 2.7 x 0.9 cm, and 2.4 x 1.3 x 0.7) fatty tumors.\par \par I suggested that clinical follow-up of all 3 would be satisfactory with periodic imaging.\par He prefers excision of the latter.\par Paperwork for surgical scheduling submitted this evening, coordinated with Dr. Jensen Nguyen.\par \par \par 5/12/2021:\par I called him and we spoke.\par May 4, 2021, he had excision of 2 soft tissue masses from his posterior right arm.\par Plastics closures: Dr. 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

## 2021-05-17 ENCOUNTER — APPOINTMENT (OUTPATIENT)
Dept: PLASTIC SURGERY | Facility: CLINIC | Age: 51
End: 2021-05-17

## 2021-05-17 VITALS — TEMPERATURE: 97.6 F | WEIGHT: 221 LBS | BODY MASS INDEX: 31.64 KG/M2 | HEIGHT: 70 IN

## 2021-05-18 ENCOUNTER — APPOINTMENT (OUTPATIENT)
Dept: INTERNAL MEDICINE | Facility: CLINIC | Age: 51
End: 2021-05-18

## 2021-05-18 ENCOUNTER — NON-APPOINTMENT (OUTPATIENT)
Age: 51
End: 2021-05-18

## 2021-05-19 NOTE — HISTORY OF PRESENT ILLNESS
[FreeTextEntry1] : 50 year old male who presents for a post op visit s/p excisional biopsy of right upper arm mass with local flap recon DOS: 05/04/21. \par Pt doing well, no complaints.

## 2021-06-10 ENCOUNTER — RESULT REVIEW (OUTPATIENT)
Age: 51
End: 2021-06-10

## 2021-06-10 ENCOUNTER — APPOINTMENT (OUTPATIENT)
Dept: PLASTIC SURGERY | Facility: CLINIC | Age: 51
End: 2021-06-10
Payer: MEDICAID

## 2021-06-10 VITALS — BODY MASS INDEX: 31.64 KG/M2 | TEMPERATURE: 98 F | HEIGHT: 70 IN | WEIGHT: 221 LBS

## 2021-06-10 DIAGNOSIS — M67.432 GANGLION, LEFT WRIST: ICD-10-CM

## 2021-06-10 DIAGNOSIS — M25.831 OTHER SPECIFIED JOINT DISORDERS, RIGHT WRIST: ICD-10-CM

## 2021-06-10 DIAGNOSIS — R22.32 LOCALIZED SWELLING, MASS AND LUMP, LEFT UPPER LIMB: ICD-10-CM

## 2021-06-10 PROCEDURE — 99024 POSTOP FOLLOW-UP VISIT: CPT

## 2021-06-16 PROBLEM — M67.432 GANGLION OF LEFT WRIST: Status: RESOLVED | Noted: 2020-08-24 | Resolved: 2021-06-16

## 2021-06-16 PROBLEM — R22.32 MASS OF LEFT WRIST: Status: RESOLVED | Noted: 2019-07-15 | Resolved: 2021-06-16

## 2021-06-16 PROBLEM — M25.831 MASS OF JOINT OF RIGHT WRIST: Status: ACTIVE | Noted: 2021-06-10

## 2021-06-19 ENCOUNTER — APPOINTMENT (OUTPATIENT)
Dept: RADIOLOGY | Facility: CLINIC | Age: 51
End: 2021-06-19
Payer: MEDICAID

## 2021-06-19 ENCOUNTER — OUTPATIENT (OUTPATIENT)
Dept: OUTPATIENT SERVICES | Facility: HOSPITAL | Age: 51
LOS: 1 days | End: 2021-06-19
Payer: MEDICAID

## 2021-06-19 DIAGNOSIS — Z98.890 OTHER SPECIFIED POSTPROCEDURAL STATES: Chronic | ICD-10-CM

## 2021-06-19 DIAGNOSIS — M67.432 GANGLION, LEFT WRIST: Chronic | ICD-10-CM

## 2021-06-19 DIAGNOSIS — Z00.8 ENCOUNTER FOR OTHER GENERAL EXAMINATION: ICD-10-CM

## 2021-06-19 PROCEDURE — 73110 X-RAY EXAM OF WRIST: CPT | Mod: 26,RT

## 2021-06-19 PROCEDURE — 73110 X-RAY EXAM OF WRIST: CPT

## 2021-07-13 ENCOUNTER — NON-APPOINTMENT (OUTPATIENT)
Age: 51
End: 2021-07-13

## 2021-07-21 ENCOUNTER — NON-APPOINTMENT (OUTPATIENT)
Age: 51
End: 2021-07-21

## 2021-07-21 ENCOUNTER — EMERGENCY (EMERGENCY)
Facility: HOSPITAL | Age: 51
LOS: 1 days | Discharge: HOME | End: 2021-07-21
Attending: EMERGENCY MEDICINE | Admitting: EMERGENCY MEDICINE
Payer: MEDICAID

## 2021-07-21 VITALS
DIASTOLIC BLOOD PRESSURE: 78 MMHG | HEART RATE: 81 BPM | OXYGEN SATURATION: 98 % | WEIGHT: 214.95 LBS | HEIGHT: 70 IN | TEMPERATURE: 98 F | SYSTOLIC BLOOD PRESSURE: 124 MMHG | RESPIRATION RATE: 17 BRPM

## 2021-07-21 VITALS
HEART RATE: 60 BPM | TEMPERATURE: 98 F | OXYGEN SATURATION: 97 % | DIASTOLIC BLOOD PRESSURE: 86 MMHG | SYSTOLIC BLOOD PRESSURE: 136 MMHG

## 2021-07-21 DIAGNOSIS — Z98.890 OTHER SPECIFIED POSTPROCEDURAL STATES: Chronic | ICD-10-CM

## 2021-07-21 DIAGNOSIS — M67.432 GANGLION, LEFT WRIST: Chronic | ICD-10-CM

## 2021-07-21 PROCEDURE — 99284 EMERGENCY DEPT VISIT MOD MDM: CPT | Mod: 25

## 2021-07-21 PROCEDURE — 72100 X-RAY EXAM L-S SPINE 2/3 VWS: CPT | Mod: 26

## 2021-07-21 PROCEDURE — 72100 X-RAY EXAM L-S SPINE 2/3 VWS: CPT

## 2021-07-21 PROCEDURE — 99284 EMERGENCY DEPT VISIT MOD MDM: CPT

## 2021-07-21 RX ORDER — IBUPROFEN 200 MG
600 TABLET ORAL ONCE
Refills: 0 | Status: COMPLETED | OUTPATIENT
Start: 2021-07-21 | End: 2021-07-21

## 2021-07-21 RX ORDER — IBUPROFEN 200 MG
1 TABLET ORAL
Qty: 20 | Refills: 0
Start: 2021-07-21 | End: 2021-07-25

## 2021-07-21 RX ORDER — CYCLOBENZAPRINE HYDROCHLORIDE 10 MG/1
1 TABLET, FILM COATED ORAL
Qty: 15 | Refills: 0
Start: 2021-07-21 | End: 2021-07-25

## 2021-07-21 RX ORDER — LIDOCAINE 4 G/100G
1 CREAM TOPICAL
Qty: 10 | Refills: 0
Start: 2021-07-21 | End: 2021-07-30

## 2021-07-21 RX ORDER — LIDOCAINE 4 G/100G
1 CREAM TOPICAL ONCE
Refills: 0 | Status: DISCONTINUED | OUTPATIENT
Start: 2021-07-21 | End: 2021-07-21

## 2021-07-21 RX ORDER — CYCLOBENZAPRINE HYDROCHLORIDE 10 MG/1
10 TABLET, FILM COATED ORAL ONCE
Refills: 0 | Status: COMPLETED | OUTPATIENT
Start: 2021-07-21 | End: 2021-07-21

## 2021-07-21 RX ORDER — LIDOCAINE 4 G/100G
1 CREAM TOPICAL ONCE
Refills: 0 | Status: COMPLETED | OUTPATIENT
Start: 2021-07-21 | End: 2021-07-21

## 2021-07-21 RX ADMIN — Medication 600 MILLIGRAM(S): at 10:51

## 2021-07-21 RX ADMIN — CYCLOBENZAPRINE HYDROCHLORIDE 10 MILLIGRAM(S): 10 TABLET, FILM COATED ORAL at 09:42

## 2021-07-21 RX ADMIN — Medication 600 MILLIGRAM(S): at 09:42

## 2021-07-21 RX ADMIN — LIDOCAINE 1 PATCH: 4 CREAM TOPICAL at 09:43

## 2021-07-21 RX ADMIN — Medication 60 MILLIGRAM(S): at 09:43

## 2021-07-21 NOTE — ED PROVIDER NOTE - CARE PROVIDER_API CALL
Arnav Araujo  ORTHOPAEDIC SURGERY  [address] [place] [address]  Phone: [phone]  Fax: [phone]  Follow Up Time:

## 2021-07-21 NOTE — ED ADULT NURSE REASSESSMENT NOTE - NS ED NURSE REASSESS COMMENT FT1
pt  c/o pain radiates down left leg with palpation now  pt pending re evaluation and medicated for pains

## 2021-07-21 NOTE — ED ADULT NURSE REASSESSMENT NOTE - NS ED NURSE REASSESS COMMENT FT1
pt re evaluated by md and to be d'c/d  pt states pain is slightly better but able to change position better than upon arrival.  pt discharged stable and ambulatory in nad at present d/c instruction reinforced and pt verbalized understanding vital signs as charted. pt advised to start prednisone tomorrow because already took today's dose. no bending lifting pushing or pulling  shoes because aggravates back pains rest and follow up orthopedist as directed . pt advised to take motrin after eating and not on empty stomach. also advised no alcohol with motrin. advised these two events may cause gi irritation and /or gi bleed  pt also advised remove patch at night and re apply in am pt re evaluated by md and to be d'c/d  pt states pain is slightly better but able to change position better than upon arrival.  pt discharged stable and ambulatory in nad at present d/c instruction reinforced and pt verbalized understanding vital signs as charted. pt advised to start prednisone tomorrow because already took today's dose. no bending lifting pushing or pulling  shoes because aggravates back pains rest and follow up orthopedist as directed . pt advised to take motrin after eating and not on empty stomach. also advised no alcohol with motrin. advised these two events may cause gi irritation and /or gi bleed  pt also advised remove patch at night and re apply in am. pt advised that flexeril may cause dizziness s,n,v, headache and advised no driving on flexeril because of potential for dizziness with head turning and pt verbalized understanding

## 2021-07-21 NOTE — ED PROVIDER NOTE - OBJECTIVE STATEMENT
Pt is a 49 yo male who presents to the ED with a cc of back pain. PMHx of Anorectal abscess, Ganglion cyst of wrist, History of Clostridium difficile colitis  2012  Mass of arm, right  x2 - right upper arm, Mass of left thigh  neuroma excised posterior thigh, Obesity, Other benign neoplasm of skin of unspecified lower limb, including hip. Pt reports that yesterday he noted that he began to develop left lower back pain with intermittent radiation down his left leg. Pain is constant but waxes and wanes in severity. Denies loss of bowel or bladder function. 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 Denies fever, chills, N/V, CP, SOB, abd pain. Denies ext numbness or weakness. Pt has not taken anything for the symptoms

## 2021-07-21 NOTE — ED PROVIDER NOTE - PATIENT PORTAL LINK FT
You can access the FollowMyHealth Patient Portal offered by [facility] by registering at the following website: [email or web address]. By joining [facility]’s FollowMyHealth portal, you will also be able to view your health information using other applications (apps) compatible with our system.

## 2021-07-21 NOTE — ED PROVIDER NOTE - NSFOLLOWUPINSTRUCTIONS_ED_ALL_ED_FT
Return to the ED for any new or worsening symptoms including but not limited to uncontrolled pain, loss of bowel or bladder control, numbness in your extremities   Take your medication as prescribed  Flexeril per label instructions as needed for pain and spasm   Prednisone per label instructions as needed for pain and spasm. When you have completed the Prednisone course you can begin Motrin per label instructions as needed for pain   Consider Tylenol per label instructions as needed for pain this medication is over the counter  Lidoderm patch to affected back region per label instructions. If this medication is not covered by your insurance you can purchase it over the counter as well  Heating pad to affected sore back region on 20 min off 40 min as needed for pain and spasm   Advance activity as tolerated  Follow up with orthopedics       Back Pain    WHAT YOU NEED TO KNOW:    Back pain is common. You may have back pain and muscle spasms. You may feel sore or stiff on one or both sides of your back. The pain may spread to your lower body. Conditions that affect the spine, joints, or muscles can cause back pain. These may include arthritis, spinal stenosis (narrowing of the spinal column), muscle tension, or breakdown of the spinal discs.    DISCHARGE INSTRUCTIONS:    Call your local emergency number (911 in the ) if:   •You have severe back pain with chest pain.      •You cannot control your urine or bowel movements.      •Your pain becomes so severe that you cannot walk.      Return to the emergency department if:   •You have pain, numbness, or weakness in one or both legs.      •You have severe back pain, nausea, and vomiting.      •You have severe back pain that spreads to your side or genital area.      Call your doctor if:   •You have back pain that does not get better with rest and pain medicine.      •You have a fever.      •You have pain that worsens when you are on your back or when you rest.      •You have pain that worsens when you cough or sneeze.      •You lose weight without trying.      •You have questions or concerns about your condition or care.      Medicines: You may need any of the following:   •NSAIDs, such as ibuprofen, help decrease swelling, pain, and fever. This medicine is available with or without a doctor's order. NSAIDs can cause stomach bleeding or kidney problems in certain people. If you take blood thinner medicine, always ask your healthcare provider if NSAIDs are safe for you. Always read the medicine label and follow directions.      •Acetaminophen decreases pain and fever. It is available without a doctor's order. Ask how much to take and how often to take it. Follow directions. Read the labels of all other medicines you are using to see if they also contain acetaminophen, or ask your doctor or pharmacist. Acetaminophen can cause liver damage if not taken correctly. Do not use more than 4 grams (4,000 milligrams) total of acetaminophen in one day.       •Muscle relaxers help decrease muscle spasms and back pain.      •Prescription pain medicine may be given. Ask your healthcare provider how to take this medicine safely. Some prescription pain medicines contain acetaminophen. Do not take other medicines that contain acetaminophen without talking to your healthcare provider. Too much acetaminophen may cause liver damage. Prescription pain medicine may cause constipation. Ask your healthcare provider how to prevent or treat constipation.       •Take your medicine as directed. Contact your healthcare provider if you think your medicine is not helping or if you have side effects. Tell him or her if you are allergic to any medicine. Keep a list of the medicines, vitamins, and herbs you take. Include the amounts, and when and why you take them. Bring the list or the pill bottles to follow-up visits. Carry your medicine list with you in case of an emergency.      How to manage your back pain:   •Apply ice on your back for 15 to 20 minutes every hour or as directed. Use an ice pack, or put crushed ice in a plastic bag. Cover it with a towel before you apply it to your skin. Ice helps prevent tissue damage and decreases pain.      •Apply heat on your back for 20 to 30 minutes every 2 hours for as many days as directed. Heat helps decrease pain and muscle spasms.      •Stay active as much as you can without causing more pain. Bed rest could make your back pain worse. Avoid heavy lifting until your pain is gone.      •Go to physical therapy as directed. A physical therapist can teach you exercises to help improve movement and strength, and to decrease pain.      Follow up with your doctor in 2 weeks, or as directed: You might need to see a specialist. Write down your questions so you remember to ask them during your visits.

## 2021-07-21 NOTE — ED PROVIDER NOTE - PROGRESS NOTE DETAILS
pt reports improvement after ED therapy. Currently resting comfortably, remains NVI. Advised on results of x-ray no acute fracture noted although reviewed that sometimes fractures can be missed on initial imaging and therefore if symptoms continue will need follow up for possible repeat x-rays with orthopedics. Advised that he is likely suffering from spasm although cannot exclude underlying disc injury and therefore is symptoms do not resolve he will need to follow up with orthopedics for possible MRI imaging. Importance of gentle stretching and movement reviewed. All questions answered. Advised to return for worsening symptoms including but not limited to worsening pain, loss of bowel or bladder control, numbness or tingling of ext.

## 2021-07-21 NOTE — ED PROVIDER NOTE - CLINICAL SUMMARY MEDICAL DECISION MAKING FREE TEXT BOX
Pt is a 51 yo male who presents to the ED with a cc of back pain. PMHx of Anorectal abscess, Ganglion cyst of wrist, History of Clostridium difficile colitis  2012  Mass of arm, right  x2 - right upper arm, Mass of left thigh  neuroma excised posterior thigh, Obesity, Other benign neoplasm of skin of unspecified lower limb, including hip. Pt reports that yesterday he noted that he began to develop left lower back pain with intermittent radiation down his left leg. Pain is constant but waxes and wanes in severity. Denies loss of bowel or bladder function. 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 Denies fever, chills, N/V, CP, SOB, abd pain. Denies ext numbness or weakness. Pt has not taken anything for the symptoms Pt is a 51 yo male who presents to the ED with a cc of back pain. PMHx of Anorectal abscess, Ganglion cyst of wrist, History of Clostridium difficile colitis  2012  Mass of arm, right  x2 - right upper arm, Mass of left thigh  neuroma excised posterior thigh, Obesity, Other benign neoplasm of skin of unspecified lower limb, including hip. Pt reports that yesterday he noted that he began to develop left lower back pain with intermittent radiation down his left leg. Pain is constant but waxes and wanes in severity. Denies loss of bowel or bladder function. 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 Denies fever, chills, N/V, CP, SOB, abd pain. Denies ext numbness or weakness. Pt has not taken anything for the symptoms. Pt presenting for what appears to be MSK back pain. No acute injury, no red flags, no traumatic event. Pt NVI with no  complaints. Will obtain x-ray, medicate for pain and monitor

## 2021-07-21 NOTE — ED ADULT NURSE NOTE - CHPI ED NUR SYMPTOMS NEG
no anorexia/no bladder dysfunction/no bowel dysfunction/no constipation/no difficulty bearing weight/no fatigue/no neck tenderness/no numbness/no tingling

## 2021-07-21 NOTE — ED PROVIDER NOTE - PHYSICAL EXAMINATION
no midline C/T/L TTP  TTP to left lower lumbar paraspinal muscle region with increased tone and spasm   NVI x 4 ext   no skin rashes noted to the back

## 2021-07-21 NOTE — ED ADULT NURSE NOTE - INTERVENTIONS DEFINITIONS
[place] to call system/Non-slip footwear when patient is off stretcher/Physically safe environment: no spills, clutter or unnecessary equipment/Stretcher in lowest position, wheels locked, appropriate side rails in place/Monitor gait and stability/Reinforce activity limits and safety measures with patient and family

## 2021-07-22 ENCOUNTER — APPOINTMENT (OUTPATIENT)
Dept: PLASTIC SURGERY | Facility: CLINIC | Age: 51
End: 2021-07-22

## 2021-08-17 ENCOUNTER — APPOINTMENT (OUTPATIENT)
Dept: INTERNAL MEDICINE | Facility: CLINIC | Age: 51
End: 2021-08-17

## 2021-09-09 ENCOUNTER — APPOINTMENT (OUTPATIENT)
Dept: PLASTIC SURGERY | Facility: CLINIC | Age: 51
End: 2021-09-09
Payer: MEDICAID

## 2021-09-09 PROCEDURE — 99212 OFFICE O/P EST SF 10 MIN: CPT

## 2021-09-21 ENCOUNTER — NON-APPOINTMENT (OUTPATIENT)
Age: 51
End: 2021-09-21

## 2021-09-22 ENCOUNTER — APPOINTMENT (OUTPATIENT)
Dept: INTERNAL MEDICINE | Facility: CLINIC | Age: 51
End: 2021-09-22

## 2021-10-26 ENCOUNTER — NON-APPOINTMENT (OUTPATIENT)
Age: 51
End: 2021-10-26

## 2021-10-26 ENCOUNTER — APPOINTMENT (OUTPATIENT)
Dept: INTERNAL MEDICINE | Facility: CLINIC | Age: 51
End: 2021-10-26

## 2021-11-08 ENCOUNTER — OUTPATIENT (OUTPATIENT)
Dept: OUTPATIENT SERVICES | Facility: HOSPITAL | Age: 51
LOS: 1 days | End: 2021-11-08

## 2021-11-08 VITALS
DIASTOLIC BLOOD PRESSURE: 78 MMHG | HEIGHT: 70 IN | OXYGEN SATURATION: 99 % | RESPIRATION RATE: 16 BRPM | HEART RATE: 67 BPM | WEIGHT: 222.01 LBS | SYSTOLIC BLOOD PRESSURE: 140 MMHG | TEMPERATURE: 97 F

## 2021-11-08 DIAGNOSIS — M67.432 GANGLION, LEFT WRIST: ICD-10-CM

## 2021-11-08 DIAGNOSIS — Z98.890 OTHER SPECIFIED POSTPROCEDURAL STATES: Chronic | ICD-10-CM

## 2021-11-08 DIAGNOSIS — M67.432 GANGLION, LEFT WRIST: Chronic | ICD-10-CM

## 2021-11-08 LAB
ALBUMIN SERPL ELPH-MCNC: 4.8 G/DL — SIGNIFICANT CHANGE UP (ref 3.3–5)
ALP SERPL-CCNC: 75 U/L — SIGNIFICANT CHANGE UP (ref 40–120)
ALT FLD-CCNC: 36 U/L — SIGNIFICANT CHANGE UP (ref 4–41)
ANION GAP SERPL CALC-SCNC: 11 MMOL/L — SIGNIFICANT CHANGE UP (ref 7–14)
AST SERPL-CCNC: 23 U/L — SIGNIFICANT CHANGE UP (ref 4–40)
BILIRUB SERPL-MCNC: 0.3 MG/DL — SIGNIFICANT CHANGE UP (ref 0.2–1.2)
BUN SERPL-MCNC: 16 MG/DL — SIGNIFICANT CHANGE UP (ref 7–23)
CALCIUM SERPL-MCNC: 9.5 MG/DL — SIGNIFICANT CHANGE UP (ref 8.4–10.5)
CHLORIDE SERPL-SCNC: 104 MMOL/L — SIGNIFICANT CHANGE UP (ref 98–107)
CO2 SERPL-SCNC: 25 MMOL/L — SIGNIFICANT CHANGE UP (ref 22–31)
CREAT SERPL-MCNC: 0.73 MG/DL — SIGNIFICANT CHANGE UP (ref 0.5–1.3)
GLUCOSE SERPL-MCNC: 108 MG/DL — HIGH (ref 70–99)
HCT VFR BLD CALC: 47.3 % — SIGNIFICANT CHANGE UP (ref 39–50)
HGB BLD-MCNC: 16.1 G/DL — SIGNIFICANT CHANGE UP (ref 13–17)
MCHC RBC-ENTMCNC: 30.7 PG — SIGNIFICANT CHANGE UP (ref 27–34)
MCHC RBC-ENTMCNC: 34 GM/DL — SIGNIFICANT CHANGE UP (ref 32–36)
MCV RBC AUTO: 90.1 FL — SIGNIFICANT CHANGE UP (ref 80–100)
NRBC # BLD: 0 /100 WBCS — SIGNIFICANT CHANGE UP
NRBC # FLD: 0 K/UL — SIGNIFICANT CHANGE UP
PLATELET # BLD AUTO: 336 K/UL — SIGNIFICANT CHANGE UP (ref 150–400)
POTASSIUM SERPL-MCNC: 4.3 MMOL/L — SIGNIFICANT CHANGE UP (ref 3.5–5.3)
POTASSIUM SERPL-SCNC: 4.3 MMOL/L — SIGNIFICANT CHANGE UP (ref 3.5–5.3)
PROT SERPL-MCNC: 7.3 G/DL — SIGNIFICANT CHANGE UP (ref 6–8.3)
RBC # BLD: 5.25 M/UL — SIGNIFICANT CHANGE UP (ref 4.2–5.8)
RBC # FLD: 12.1 % — SIGNIFICANT CHANGE UP (ref 10.3–14.5)
SODIUM SERPL-SCNC: 140 MMOL/L — SIGNIFICANT CHANGE UP (ref 135–145)
WBC # BLD: 5.13 K/UL — SIGNIFICANT CHANGE UP (ref 3.8–10.5)
WBC # FLD AUTO: 5.13 K/UL — SIGNIFICANT CHANGE UP (ref 3.8–10.5)

## 2021-11-08 NOTE — H&P PST ADULT - NSANTHGENDERRD_ENT_A_CORE
Asthma    Asthma is a recurring condition in which the airways tighten and narrow, making it difficult to breath. Asthma exacerbations, also called asthma attacks, range from minor to life-threatening. Symptoms include wheezing, coughing, chest tightness, or shortness of breath. The diagnosis of asthma is made by a review of your medical history and a physical exam, but may involve additional testing. Asthma cannot be cured, but medicines and lifestyle changes can help control it. Avoid triggers of asthma which may include animal dander, pollen, mold, smoke, air pollutants, etc.     SEEK IMMEDIATE MEDICAL CARE IF YOU HAVE THE FOLLOWING SYMPTOMS: worsening of symptoms, symptoms that do not respond to medication, shortness of breath at rest, chest pain, bluish discoloration to lips or fingertips, lightheadedness/dizziness, or fever. Yes

## 2021-11-08 NOTE — H&P PST ADULT - NSICDXPASTMEDICALHX_GEN_ALL_CORE_FT
PAST MEDICAL HISTORY:  Anorectal abscess     Ganglion cyst of wrist     History of Clostridium difficile colitis 2012    Mass of arm, right x2 - right upper arm    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      PAST MEDICAL HISTORY:  Anorectal abscess     Fatty liver     Ganglion cyst of wrist     History of Clostridium difficile colitis 2012    Mass of arm, right x2 - right upper arm    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

## 2021-11-08 NOTE — H&P PST ADULT - SKIN/BREAST COMMENTS
hx of left wrist ganglion cyst which had been removed 2 x before, most recently 4/20201.  Pt reports cyst returned 1 month after surgery.  Scheduled for Excisional biopsy left wrist, possible local flap

## 2021-11-08 NOTE — H&P PST ADULT - RS GEN PE MLT RESP DETAILS PC
Retinal tear and detachment warning symptoms reviewed and patient instructed to call immediately if increasing floaters, flashes, or decreasing peripheral vision. respirations non-labored/clear to auscultation bilaterally/no rales/no rhonchi/no wheezes

## 2021-11-08 NOTE — H&P PST ADULT - NSICDXPASTSURGICALHX_GEN_ALL_CORE_FT
PAST SURGICAL HISTORY:  Ganglion cyst of wrist, left excision 2019    H/O excision of mass left thigh benign    History of drainage of abscess 2012 anorectal    History of excision of mass right leg 6/2018    S/P colonoscopy 2012    S/P hernia repair umbilical - mesh     PAST SURGICAL HISTORY:  Ganglion cyst of wrist, left excision 2019 and 4/2021    H/O excision of mass left thigh benign    History of drainage of abscess 2012 anorectal    History of excision of mass right leg 6/2018    S/P colonoscopy 2012    S/P hernia repair umbilical - mesh

## 2021-11-08 NOTE — H&P PST ADULT - ASSESSMENT
51 y/o male with hx of left wrist ganglion cyst which had been removed 2 x before, most recently 4/20201.  Pt reports cyst returned 1 month after surgery.  Scheduled for Excisional biopsy left wrist, possible local flap

## 2021-11-08 NOTE — H&P PST ADULT - PROBLEM SELECTOR PLAN 1
Excisional biopsy left wrist, possible local flap     CBC CMP    Preop instructions and antibacterial soap given and explained (verbal and written), with teach back.

## 2021-11-12 ENCOUNTER — APPOINTMENT (OUTPATIENT)
Dept: DISASTER EMERGENCY | Facility: CLINIC | Age: 51
End: 2021-11-12

## 2021-11-12 VITALS
HEART RATE: 68 BPM | RESPIRATION RATE: 16 BRPM | HEIGHT: 70 IN | OXYGEN SATURATION: 99 % | WEIGHT: 222.01 LBS | TEMPERATURE: 98 F | DIASTOLIC BLOOD PRESSURE: 75 MMHG | SYSTOLIC BLOOD PRESSURE: 136 MMHG

## 2021-11-12 LAB — SARS-COV-2 N GENE NPH QL NAA+PROBE: NOT DETECTED

## 2021-11-14 ENCOUNTER — TRANSCRIPTION ENCOUNTER (OUTPATIENT)
Age: 51
End: 2021-11-14

## 2021-11-15 ENCOUNTER — APPOINTMENT (OUTPATIENT)
Dept: PLASTIC SURGERY | Facility: HOSPITAL | Age: 51
End: 2021-11-15
Payer: MEDICAID

## 2021-11-15 ENCOUNTER — RESULT REVIEW (OUTPATIENT)
Age: 51
End: 2021-11-15

## 2021-11-15 ENCOUNTER — OUTPATIENT (OUTPATIENT)
Dept: OUTPATIENT SERVICES | Facility: HOSPITAL | Age: 51
LOS: 1 days | Discharge: HOME | End: 2021-11-15
Payer: MEDICAID

## 2021-11-15 VITALS
SYSTOLIC BLOOD PRESSURE: 110 MMHG | HEART RATE: 62 BPM | OXYGEN SATURATION: 98 % | DIASTOLIC BLOOD PRESSURE: 62 MMHG | RESPIRATION RATE: 18 BRPM

## 2021-11-15 DIAGNOSIS — Z98.890 OTHER SPECIFIED POSTPROCEDURAL STATES: Chronic | ICD-10-CM

## 2021-11-15 DIAGNOSIS — M67.432 GANGLION, LEFT WRIST: ICD-10-CM

## 2021-11-15 DIAGNOSIS — M67.432 GANGLION, LEFT WRIST: Chronic | ICD-10-CM

## 2021-11-15 PROCEDURE — 88305 TISSUE EXAM BY PATHOLOGIST: CPT | Mod: 26

## 2021-11-15 PROCEDURE — 25112 REREMOVE WRIST TENDON LESION: CPT

## 2021-11-15 NOTE — ASU DISCHARGE PLAN (ADULT/PEDIATRIC) - A. DRIVE A CAR, OPERATE POWER TOOLS OR MACHINERY
What Type Of Note Output Would You Prefer (Optional)?: Standard Output How Severe Is Your Skin Lesion?: mild Has Your Skin Lesion Been Treated?: not been treated Is This A New Presentation, Or A Follow-Up?: [place]ole Statement Selected

## 2021-11-15 NOTE — ASU DISCHARGE PLAN (ADULT/PEDIATRIC) - ASU DC SPECIAL INSTRUCTIONSFT
Keep dressing in place; elevate extremity.  Keep splint dry.  Take pain medication as needed for pain.    Please follow up with Dr. Nguyen within x1 week after discharge from the hospital. You may call [phone] to schedule an appointment.

## 2021-11-16 ENCOUNTER — NON-APPOINTMENT (OUTPATIENT)
Age: 51
End: 2021-11-16

## 2021-11-17 PROBLEM — K76.0 FATTY (CHANGE OF) LIVER, NOT ELSEWHERE CLASSIFIED: Chronic | Status: ACTIVE | Noted: 2021-11-08

## 2021-11-20 LAB — SURGICAL PATHOLOGY STUDY: SIGNIFICANT CHANGE UP

## 2021-11-24 ENCOUNTER — APPOINTMENT (OUTPATIENT)
Dept: PLASTIC SURGERY | Facility: CLINIC | Age: 51
End: 2021-11-24
Payer: MEDICAID

## 2021-11-24 VITALS
DIASTOLIC BLOOD PRESSURE: 82 MMHG | OXYGEN SATURATION: 96 % | WEIGHT: 221 LBS | BODY MASS INDEX: 31.64 KG/M2 | SYSTOLIC BLOOD PRESSURE: 144 MMHG | HEART RATE: 75 BPM | TEMPERATURE: 97.8 F | HEIGHT: 70 IN

## 2021-11-24 DIAGNOSIS — R22.32 LOCALIZED SWELLING, MASS AND LUMP, LEFT UPPER LIMB: ICD-10-CM

## 2021-11-24 DIAGNOSIS — M67.40 GANGLION, UNSPECIFIED SITE: ICD-10-CM

## 2021-11-24 PROCEDURE — 99024 POSTOP FOLLOW-UP VISIT: CPT

## 2021-12-21 ENCOUNTER — APPOINTMENT (OUTPATIENT)
Dept: PLASTIC SURGERY | Facility: CLINIC | Age: 51
End: 2021-12-21

## 2021-12-27 ENCOUNTER — APPOINTMENT (OUTPATIENT)
Dept: INTERNAL MEDICINE | Facility: CLINIC | Age: 51
End: 2021-12-27
Payer: MEDICAID

## 2021-12-27 ENCOUNTER — OUTPATIENT (OUTPATIENT)
Dept: OUTPATIENT SERVICES | Facility: HOSPITAL | Age: 51
LOS: 1 days | End: 2021-12-27
Payer: MEDICAID

## 2021-12-27 VITALS
HEIGHT: 70 IN | DIASTOLIC BLOOD PRESSURE: 82 MMHG | WEIGHT: 227 LBS | BODY MASS INDEX: 32.5 KG/M2 | HEART RATE: 90 BPM | SYSTOLIC BLOOD PRESSURE: 144 MMHG | OXYGEN SATURATION: 97 %

## 2021-12-27 DIAGNOSIS — M67.432 GANGLION, LEFT WRIST: Chronic | ICD-10-CM

## 2021-12-27 DIAGNOSIS — Z98.890 OTHER SPECIFIED POSTPROCEDURAL STATES: Chronic | ICD-10-CM

## 2021-12-27 DIAGNOSIS — I10 ESSENTIAL (PRIMARY) HYPERTENSION: ICD-10-CM

## 2021-12-27 PROCEDURE — 99214 OFFICE O/P EST MOD 30 MIN: CPT | Mod: GC

## 2021-12-27 PROCEDURE — G0463: CPT

## 2021-12-28 NOTE — HISTORY OF PRESENT ILLNESS
[FreeTextEntry8] : Pt. is a 49yo M w/ PMH L thigh schwannoma & R leg dermatofibroma s/p resections here for back and abdominal pain. Pt. states that back pain is b/l in lower back, achy, worse in the morning when waking up and in the evening while sitting down after working, worse when picking up son, and in sides not central part of back. Denies loss of sensation, pain waking him up in the nighttime, issues with urination/defecation, or weakness in legs b/l. States that he has not tried ibuprofen or any other pain medications. Says that he also has been having abdominal pain b/l in lower quadrants, does not worsen or improve based on diet, and gets better after using bathroom. Is concerned because wife had positive urinary test, potentially sexually transmitted but unsure, asking for testing. Pt. also stating that he has been highly stressed at home and at work, endorsing current low mood, anhedonia, guilt, poor sleep, increased appetite, and intermittent passive death wish. Denies SI. States that he has been increasingly stressed with the loss of various family members and his family's health.

## 2021-12-28 NOTE — END OF VISIT
[] : Resident [FreeTextEntry3] : 52yo M with multiple medical complaints--chronic low back pain, depressed mood, STI screening, and elevated BP. Exam overall unremarkable aside from HTN. Low back pain: No warning signs, agree w/ PT. Patient would like to avoid medications, so will offer diclofenac gel. Depressed mood: Start fluoxetine with close follow-up. Elevated BP: Defer meds given new prozac start. Will obtain ASCVD risk stratification labs given drawing blood for STI screening. Close follow-up.

## 2021-12-28 NOTE — PHYSICAL EXAM
[PERRL] : pupils equal round and reactive to light [EOMI] : extraocular movements intact [Normal Outer Ear/Nose] : the outer ears and nose were normal in appearance [Supple] : supple [No Edema] : there was no peripheral edema [Soft] : abdomen soft [Non Tender] : non-tender [Non-distended] : non-distended [Normal Bowel Sounds] : normal bowel sounds [No CVA Tenderness] : no CVA  tenderness [No Spinal Tenderness] : no spinal tenderness [No Joint Swelling] : no joint swelling [Grossly Normal Strength/Tone] : grossly normal strength/tone [No Rash] : no rash [Normal] : normal gait, coordination grossly intact, no focal deficits and deep tendon reflexes were 2+ and symmetric [Normal Affect] : the affect was normal [Normal Insight/Judgement] : insight and judgment were intact [de-identified] : Pain with palpation b/l in lower back on sides

## 2021-12-28 NOTE — PLAN
[FreeTextEntry1] : \par \par Pt. 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 currently endorsing symptoms of depression\par -Prozac 20mg QD prescribed\par -Pt. made aware of full benefit of medicine starting in 4-6 weeks\par \par Abdominal Pain\par -IBS symptoms discussed with patient\par -Chlamydia/gonorrhea, HIV, and RPR sent \par -UA sent\par \par Health Maintenance\par -Lipid panel, CMP sent\par -Pt. to f/u in 4-6 weeks

## 2021-12-29 LAB
ALBUMIN SERPL ELPH-MCNC: 4.9 G/DL
ALP BLD-CCNC: 70 U/L
ALT SERPL-CCNC: 28 U/L
ANION GAP SERPL CALC-SCNC: 13 MMOL/L
APPEARANCE: CLEAR
AST SERPL-CCNC: 22 U/L
BILIRUB SERPL-MCNC: 0.2 MG/DL
BILIRUBIN URINE: NEGATIVE
BLOOD URINE: NEGATIVE
BUN SERPL-MCNC: 14 MG/DL
C TRACH RRNA SPEC QL NAA+PROBE: NOT DETECTED
CALCIUM SERPL-MCNC: 9.7 MG/DL
CHLORIDE SERPL-SCNC: 106 MMOL/L
CHOLEST SERPL-MCNC: 246 MG/DL
CO2 SERPL-SCNC: 24 MMOL/L
COLOR: YELLOW
CREAT SERPL-MCNC: 0.85 MG/DL
GLUCOSE QUALITATIVE U: NEGATIVE
GLUCOSE SERPL-MCNC: 80 MG/DL
HDLC SERPL-MCNC: 52 MG/DL
HIV1+2 AB SPEC QL IA.RAPID: NONREACTIVE
KETONES URINE: NEGATIVE
LDLC SERPL CALC-MCNC: 130 MG/DL
LEUKOCYTE ESTERASE URINE: NEGATIVE
N GONORRHOEA RRNA SPEC QL NAA+PROBE: NOT DETECTED
NITRITE URINE: NEGATIVE
NONHDLC SERPL-MCNC: 195 MG/DL
PH URINE: 5.5
POTASSIUM SERPL-SCNC: 4.5 MMOL/L
PROT SERPL-MCNC: 7.1 G/DL
PROTEIN URINE: NORMAL
RPR SER-TITR: NORMAL
SODIUM SERPL-SCNC: 144 MMOL/L
SOURCE AMPLIFICATION: NORMAL
SPECIFIC GRAVITY URINE: 1.03
TRIGL SERPL-MCNC: 321 MG/DL
UROBILINOGEN URINE: NORMAL

## 2021-12-30 DIAGNOSIS — M54.50 LOW BACK PAIN, UNSPECIFIED: ICD-10-CM

## 2021-12-30 DIAGNOSIS — F32.A DEPRESSION, UNSPECIFIED: ICD-10-CM

## 2022-01-03 ENCOUNTER — OUTPATIENT (OUTPATIENT)
Dept: OUTPATIENT SERVICES | Facility: HOSPITAL | Age: 52
LOS: 1 days | End: 2022-01-03
Payer: MEDICAID

## 2022-01-03 DIAGNOSIS — M67.432 GANGLION, LEFT WRIST: Chronic | ICD-10-CM

## 2022-01-03 DIAGNOSIS — Z98.890 OTHER SPECIFIED POSTPROCEDURAL STATES: Chronic | ICD-10-CM

## 2022-01-03 DIAGNOSIS — Z20.828 CONTACT WITH AND (SUSPECTED) EXPOSURE TO OTHER VIRAL COMMUNICABLE DISEASES: ICD-10-CM

## 2022-01-03 PROCEDURE — U0003: CPT

## 2022-01-03 PROCEDURE — U0005: CPT

## 2022-01-08 ENCOUNTER — OUTPATIENT (OUTPATIENT)
Dept: OUTPATIENT SERVICES | Facility: HOSPITAL | Age: 52
LOS: 1 days | End: 2022-01-08
Payer: MEDICAID
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
## 2022-01-08 DIAGNOSIS — Z20.828 CONTACT WITH AND (SUSPECTED) EXPOSURE TO OTHER VIRAL COMMUNICABLE DISEASES: ICD-10-CM

## 2022-01-08 DIAGNOSIS — Z98.890 OTHER SPECIFIED POSTPROCEDURAL STATES: Chronic | ICD-10-CM
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
## 2022-01-08 DIAGNOSIS — M67.432 GANGLION, LEFT WRIST: Chronic | ICD-10-CM

## 2022-01-08 PROCEDURE — U0005: CPT

## 2022-01-08 PROCEDURE — U0003: CPT

## 2022-01-09 LAB — SARS-COV-2 RNA SPEC QL NAA+PROBE: DETECTED

## 2022-01-27 ENCOUNTER — APPOINTMENT (OUTPATIENT)
Dept: SURGICAL ONCOLOGY | Facility: CLINIC | Age: 52
End: 2022-01-27
Payer: MEDICAID

## 2022-01-27 VITALS
SYSTOLIC BLOOD PRESSURE: 110 MMHG | BODY MASS INDEX: 31.35 KG/M2 | OXYGEN SATURATION: 96 % | RESPIRATION RATE: 16 BRPM | DIASTOLIC BLOOD PRESSURE: 75 MMHG | WEIGHT: 219 LBS | HEIGHT: 70 IN | TEMPERATURE: 97.6 F | HEART RATE: 97 BPM

## 2022-01-27 PROCEDURE — 99213 OFFICE O/P EST LOW 20 MIN: CPT

## 2022-01-27 NOTE — HISTORY OF PRESENT ILLNESS
[de-identified] : ***Originally REFERRED FROM [facility]\par \par \par 51 year-old gentleman.\par April 2021 he presented with with a tender mass of the upper RIGHT ARM.\par He had originally shown this area to me in May 2019.\par At that time it was ~2 cm diameter, subcutaneous in location, smooth, mobile, and nontender.\par April 2021 sonography did not suggest any significant change.\par Although I offered expectant management, he chose excision.\par \par May 2021:\par Excision of a 2.5 cm lipoma, and 3.5 cm angiolipoma, both from the proximal, posterior, right arm.\par Plastics: Dr. Jensen Nguyen.\par \par \par Today he is asymptomatic with regards to the nodule on the right parieto-occipital scalp, the surgical site on the posterior upper right arm, medial right ankle, and posterior left thigh.\par No new complaints.\par \par \par + Known subcutaneous nodule of the right parietal/occipital scalp.\par \par \par + history of a schwannoma of his posterior LEFT THIGH \par \par \par February 2017:\par Resection of a 6 cm schwannoma from the posterior LEFT THIGH, with neurosurgery (Dr. George HERNANDES), and reconstruction by Dr. Jensen Nguyen.\par Postoperative course was prolonged by severe constipation from overuse of narcotic analgesics, requiring readmission.\par \par Eventually, recuperated completely and has been followed carefully clinically.\par \par \par November 2016 presented to general surgery clinic with a 7-8 year history of an enlarging mass in the posterior left thigh.\par \par Summer 2020 episode of shingles.\par \par No personal history of malignancy.\par \par + FH:\par Spring 2020, son in [place] dx'd and being treated for breast cancer.\par \par \par ~January 2018 (cannot specify duration) he noticed a nodule on the inner aspect of his right leg. 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 Nguyen) demonstrated dermatofibroma, with microscopic involvement of the margins.\par August 2018 reexcision (By me, with reconstruction by Dr. Nguyen) obtained negative margins.\par \par February 2021:\par Excision of a left wrist mass by Dr. Nguyen.\par Pathology: Mature adipose tissue and fibrous connective tissue with mild edema\par \par July 2019 he had excision of a left wrist ganglion cyst by Dr. Jensen Nguyen.\par \par \par His internist is Dr. Slava THOMPSON.\par \par He has no significant past medical history.\par \par He takes no regular medications\par \par \par Colonoscopy at age 46 okay x10 years.

## 2022-01-27 NOTE — ASSESSMENT
[FreeTextEntry1] : Clinically doing well.\par \par Presently no imaging is warranted.\par \par I have asked to see him in another year, sooner if needed\par \par \par

## 2022-01-27 NOTE — REVIEW OF SYSTEMS
[Negative] : Heme/Lymph [de-identified] : Benign soft tissue tumors of the right arm [de-identified] : Subcutaneous scalp lesion [de-identified] : History of schwannoma

## 2022-01-28 ENCOUNTER — RX RENEWAL (OUTPATIENT)
Age: 52
End: 2022-01-28

## 2022-04-23 ENCOUNTER — EMERGENCY (EMERGENCY)
Facility: HOSPITAL | Age: 52
LOS: 1 days | Discharge: HOME | End: 2022-04-23
Attending: EMERGENCY MEDICINE | Admitting: EMERGENCY MEDICINE
Payer: MEDICAID

## 2022-04-23 VITALS
HEIGHT: 70 IN | HEART RATE: 77 BPM | DIASTOLIC BLOOD PRESSURE: 89 MMHG | RESPIRATION RATE: 18 BRPM | WEIGHT: 220.02 LBS | TEMPERATURE: 98 F | OXYGEN SATURATION: 97 % | SYSTOLIC BLOOD PRESSURE: 137 MMHG

## 2022-04-23 DIAGNOSIS — Z98.890 OTHER SPECIFIED POSTPROCEDURAL STATES: Chronic | ICD-10-CM

## 2022-04-23 DIAGNOSIS — M67.432 GANGLION, LEFT WRIST: Chronic | ICD-10-CM

## 2022-04-23 PROCEDURE — 99283 EMERGENCY DEPT VISIT LOW MDM: CPT

## 2022-04-23 NOTE — ED ADULT NURSE NOTE - NSICDXPASTMEDICALHX_GEN_ALL_CORE_FT
PAST MEDICAL HISTORY:  Anorectal abscess     Fatty liver     Ganglion cyst of wrist     History of Clostridium difficile colitis 2012    Mass of arm, right x2 - right upper arm    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

## 2022-04-23 NOTE — ED PROVIDER NOTE - NSFOLLOWUPINSTRUCTIONS_ED_ALL_ED_FT
Follow up with your dentist for re-evaluation, ongoing care and treatment. Take pain medication as prescribed. Continue full course of augmentin as prescribed earlier. If having worsening of symptoms, fever, difficulty breathing or swallowing or other related symptoms, RETURN TO THE ER IMMEDIATELY.     Dental Pain       Dental pain is often a sign that something is wrong with your teeth or gums. You can also have pain after a dental treatment. If you have dental pain, it is important to contact your dentist, especially if the cause of the pain is not known. Dental pain may hurt a lot or a little and can be caused by many things, including:  •Tooth decay (cavities or caries).      •Infection.      •The inner part of the tooth being filled with pus (an abscess).      •Injury.      •A crack in the tooth.      •Gums that move back and expose the root of a tooth.      •Gum disease.      •Abnormal grinding or clenching of teeth.      •Not taking good care of your teeth.      Sometimes the cause of pain is not known.    You may have pain all the time, or it may happen only when you are:  •Chewing.      •Exposed to hot or cold temperatures.      •Eating or drinking foods or drinks that have a lot of sugar in them, such as soda or candy.        Follow these instructions at home:    Medicines     •Take over-the-counter and prescription medicines only as told by your dentist.      •If you were prescribed an antibiotic medicine, take it as told by your dentist. Do not stop taking it even if you start to feel better.      Eating and drinking     Do not eat foods or drinks that cause you pain. These include:  •Very hot or very cold foods or drinks.      •Sweet or sugary foods or drinks.        Managing pain and swelling  •If told, put ice on the painful area of your face. To do this:  •Put ice in a plastic bag.       •Place a towel between your skin and the bag.       •Leave the ice on for 20 minutes, 2–3 times a day.      •Take off the ice if your skin turns bright red. This is very important. If you cannot feel pain, heat, or cold, you have a greater risk of damage to the area.        Brushing your teeth    •Brush your teeth twice a day using a fluoride toothpaste.       •Use a toothpaste made for sensitive teeth as told by your dentist.      •Use a soft toothbrush.      General instructions    •Floss your teeth at least once a day.      •Do not put heat on the outside of your face.      •Rinse your mouth often with salt water. To make salt water, dissolve ½–1 tsp (3–6 g) of salt in 1 cup (237 mL) of warm water.      •Watch your dental pain. Let your dentist know if there are any changes.      •Keep all follow-up visits.        Contact a dentist if:    •You have dental pain and you do not know why.      •Medicine does not help your pain.      •Your symptoms get worse.      •You have new symptoms.        Get help right away if:    •You cannot open your mouth.      •You are having trouble breathing or swallowing.      •You have a fever.      •Your face, neck, or jaw is swollen.      These symptoms may be an emergency. Get help right away. Call your local emergency services (911 in the [place]   • Do not wait to see if the symptoms will go away.       • Do not drive yourself to the hospital.         Summary    •Dental pain may be caused by many things, including tooth decay, injury, or infection. In some cases, the cause is not known.      •Dental pain may hurt a lot or very little. You may have pain all the time, or you may have it only when you eat or drink.      •Take over-the-counter and prescription medicines only as told by your dentist.      •Watch your dental pain for any changes. Let your dentist know if symptoms get worse.      This information is not intended to replace advice given to you by your health care provider. Make sure you discuss any questions you have with your health care provider.

## 2022-04-23 NOTE — ED PROVIDER NOTE - PATIENT PORTAL LINK FT
You can access the FollowMyHealth Patient Portal offered by [facility] by registering at the following website: [email or web address]. By joining [employer]’s FollowMyHealth portal, you will also be able to view your health information using other applications (apps) compatible with our system.

## 2022-04-23 NOTE — ED ADULT TRIAGE NOTE - CHIEF COMPLAINT QUOTE
"I have a tooth ache that started last Monday" was seen at dentist. started antibiotics. pain uncontrolled

## 2022-04-23 NOTE — ED PROVIDER NOTE - OBJECTIVE STATEMENT
50 y/o M with no pmhx presents with c/o right upper tooth pain x 1 week. Pt states that he Patient with toothache scheduled for root canal on Tuesday. On abx. Needs increased pain control. No visible abscess. Will provide stronger analgesics pending dental f/u 52 y/o M with no significant pmhx presents with c/o right upper tooth pain x 1 week. Pt states that he started having right upper tooth pain last weekend, saw dentist earlier this week and was prescribed augmentin and motrin 600mg for pain. States that he was given appt for 3 weeks f/u for root canal but states that pain has not been controlled with motrin. Spoke with dentist again and had appt moved up to tuesday, 4/26 however was in too much pain today and came to ED for eval. Denies fever, difficulty breathing or swallowing, drooling, n/v or other symptoms.

## 2022-04-23 NOTE — ED ADULT NURSE REASSESSMENT NOTE - NS ED NURSE REASSESS COMMENT FT1
pt seen and examined. Rx sent to pharmacy. pt d/c to self. verbalized understanding of d/c instructions

## 2022-04-23 NOTE — ED ADULT NURSE NOTE - NSICDXPASTSURGICALHX_GEN_ALL_CORE_FT
PAST SURGICAL HISTORY:  Ganglion cyst of wrist, left excision 2019 and 4/2021    H/O excision of mass left thigh benign    History of drainage of abscess 2012 anorectal    History of excision of mass right leg 6/2018    S/P colonoscopy 2012    S/P hernia repair umbilical - mesh

## 2022-04-23 NOTE — ED ADULT TRIAGE NOTE - PRO INTERPRETER NEED 2
Ss note:7/29/2020.1:44 PM COVID testing negative today. Discharge order noted, LOC obtained. Pt to transfer to [facility] via [facility] today at 4:30 pm. Facility liaison, nursing and pt spouse José aware, he will try to be present at hospital when pt discharges. The spouse is very thankful to all staff who assisted his wife while hospitalized.  Stuart Fountain, KIM English

## 2022-04-23 NOTE — ED PROVIDER NOTE - ATTENDING APP SHARED VISIT CONTRIBUTION OF CARE
Ricky Blount MD: I have personally performed a face to face diagnostic evaluation on this patient.  I have reviewed the PA note and agree with the history, exam, and plan of care, except as noted.  History and Exam by me shows same findings as documented

## 2022-04-23 NOTE — ED PROVIDER NOTE - NS ED ATTENDING STATEMENT MOD
This was a shared visit with the JEFF. I reviewed and verified the documentation and independently performed the documented:

## 2022-04-23 NOTE — ED PROVIDER NOTE - CLINICAL SUMMARY MEDICAL DECISION MAKING FREE TEXT BOX
Patient with toothache scheduled for root canal on Tuesday. On abx. Needs increased pain control. No visible abscess. Will provide stronger analgesics pending dental f/u

## 2022-04-23 NOTE — ED PROVIDER NOTE - ENMT, MLM
Airway patent. Mouth with normal mucosa. Throat has no vesicles, no oropharyngeal exudates and uvula is midline. +ttp and percussion #3, right upper tooth, no fluctuant abscess/swelling/erythema/drainage noted, no drooling/stridor/hoarseness noted

## 2022-04-23 NOTE — ED ADULT NURSE NOTE - NSFALLRSKASSESSTYPE_ED_ALL_ED
Called patient, appointment scheduled for 4/14/2021 3:15 PM with Dr. Snowden. Patient expressed thanks with no further questions at this time.    Initial (On Arrival)

## 2022-04-23 NOTE — ED ADULT NURSE NOTE - OBJECTIVE STATEMENT
pt reports tooth pain that started Monday; was seen by dentist and started antibiotics. taking motrin and tylenol at home but reports no relief of pain at this time

## 2022-05-09 ENCOUNTER — APPOINTMENT (OUTPATIENT)
Dept: SURGICAL ONCOLOGY | Facility: CLINIC | Age: 52
End: 2022-05-09
Payer: MEDICAID

## 2022-05-09 VITALS
WEIGHT: 220 LBS | RESPIRATION RATE: 15 BRPM | DIASTOLIC BLOOD PRESSURE: 84 MMHG | HEART RATE: 72 BPM | SYSTOLIC BLOOD PRESSURE: 128 MMHG | BODY MASS INDEX: 31.5 KG/M2 | TEMPERATURE: 97.7 F | OXYGEN SATURATION: 96 % | HEIGHT: 70 IN

## 2022-05-09 DIAGNOSIS — R22.2 LOCALIZED SWELLING, MASS AND LUMP, TRUNK: ICD-10-CM

## 2022-05-09 PROCEDURE — 99213 OFFICE O/P EST LOW 20 MIN: CPT

## 2022-05-22 ENCOUNTER — APPOINTMENT (OUTPATIENT)
Dept: ULTRASOUND IMAGING | Facility: CLINIC | Age: 52
End: 2022-05-22
Payer: MEDICAID

## 2022-05-22 ENCOUNTER — OUTPATIENT (OUTPATIENT)
Dept: OUTPATIENT SERVICES | Facility: HOSPITAL | Age: 52
LOS: 1 days | End: 2022-05-22
Payer: MEDICAID

## 2022-05-22 DIAGNOSIS — Z98.890 OTHER SPECIFIED POSTPROCEDURAL STATES: Chronic | ICD-10-CM

## 2022-05-22 DIAGNOSIS — M67.432 GANGLION, LEFT WRIST: Chronic | ICD-10-CM

## 2022-05-22 DIAGNOSIS — Z00.8 ENCOUNTER FOR OTHER GENERAL EXAMINATION: ICD-10-CM

## 2022-05-22 DIAGNOSIS — R22.2 LOCALIZED SWELLING, MASS AND LUMP, TRUNK: ICD-10-CM

## 2022-05-22 PROCEDURE — 76604 US EXAM CHEST: CPT | Mod: 26

## 2022-05-22 PROCEDURE — 76604 US EXAM CHEST: CPT

## 2022-06-03 NOTE — H&P PST ADULT - NSANTHAGERD_ENT_A_CORE
- decrease vit D to 1000 units a day  - start calcitriol 0 25mcg three times a week    - Please call me in 10 days after having your blood work done to review the results if you do not hear back from me or my office, as I may have not received the results  - please remember to perform blood work prior to the next visit  - Please call if the blood pressure top number is greater than 150 or less than 110 consistently  - Please call if you are gaining more than 2lbs in 2 days for adjustment of water pills   ~ Please AVOID the following pain medications  LIST OF NSAIDS (NONSTEROIDAL ANTI-INFLAMMATORY DRUGS) AND DUQUE-2 INHIBITORS    DIFLUNISAL (DOLOBID)  IBUPROFEN (MOTRIN, ADVIL)  FLURBIPROFEN (ANSAID)  KETOPROFEN (ORUDIS, ORUVAIL)  FENOPROFEN (NALFON)  NABUMETONE (RELAFEN)  PIROXICAM (FELDENE)  NAPROXEN (ALEVE, NAPROSYN, NAPRELAN, ANAPROX)  DICLOFENAC (VOLTAREN, CATAFLAM)  INDOMETHACIN (INDOCIN)  SULINDAC (CLINORIL)  TOLMETIN (TOLETIN)  ETODOLAC (LODINE)  MELOXICAM (MOBIC)  KETOROLAC (TORADOL)  OXAPROZIN (DAYPRO)  CELECOXIB (CELEBREX)    Phosphorus diet  Follow a low phosphorus diet      Avoid these higher phosphorus foods: Choose these lower phosphorus foods:   Milk, pudding or yogurt (from animals and from many soy varieties) Rice milk (unfortified), nondairy creamer (if it doesn't have terms in the ingredients list that contain the letters "phos")   Hard cheeses, ricotta or cottage cheese, fat-free cream cheese Regular and low-fat cream cheese   Ice cream or frozen yogurt Sherbet or frozen fruit pops   Soups made with higher phosphorus ingredients (milk, dried peas, beans, lentils) Soups made with lower phosphorus ingredients (broth- or water-based with other lower phosphorus ingredients)   Whole grains, including whole-grain breads, crackers, cereal, rice and pasta Refined grains, including white bread, crackers, cereals, rice and pasta   Quick breads, biscuits, cornbread, muffins, pancakes or waffles Homemade refined (white) dinner rolls, bagels or English muffins   Dried peas (split, black-eyed), beans (black, garbanzo, lima, kidney, navy, kennedy) or lentils Green peas (canned, frozen), green beans or wax beans   Organ meats, walleye, pollock or sardines Lean beef, pork, lamb, poultry or other fish   Nuts and seeds Popcorn   Peanut butter and other nut butters Jam, jelly or honey   Chocolate, including chocolate drinks Carob (chocolate-flavored) candy, hard candy or gumdrops   Waqas and pepper-type sodas, flavored gutierrez, bottled teas (if a term in the ingredients list contains the letters "phos") Lemon-lime soda, ginger ale or root beer, plain water   Follow a moderate potassium diet  No

## 2022-06-09 PROBLEM — R22.2 MASS ON BACK: Status: RESOLVED | Noted: 2022-05-09 | Resolved: 2022-06-09

## 2022-06-20 ENCOUNTER — EMERGENCY (EMERGENCY)
Facility: HOSPITAL | Age: 52
LOS: 1 days | Discharge: HOME | End: 2022-06-20
Attending: EMERGENCY MEDICINE
Payer: MEDICAID

## 2022-06-20 VITALS
TEMPERATURE: 98 F | HEART RATE: 69 BPM | OXYGEN SATURATION: 98 % | WEIGHT: 220.02 LBS | SYSTOLIC BLOOD PRESSURE: 148 MMHG | RESPIRATION RATE: 18 BRPM | DIASTOLIC BLOOD PRESSURE: 93 MMHG | HEIGHT: 70 IN

## 2022-06-20 DIAGNOSIS — M67.432 GANGLION, LEFT WRIST: Chronic | ICD-10-CM

## 2022-06-20 DIAGNOSIS — Z98.890 OTHER SPECIFIED POSTPROCEDURAL STATES: Chronic | ICD-10-CM

## 2022-06-20 PROCEDURE — 99285 EMERGENCY DEPT VISIT HI MDM: CPT

## 2022-06-20 NOTE — ED ADULT TRIAGE NOTE - CHIEF COMPLAINT QUOTE
Chest pain & palpitations x 1330 today. Pt reports working outside today, feeling weak, weakness has since resolved. Pt also reporting headache. BEFAST negative.

## 2022-06-21 VITALS
DIASTOLIC BLOOD PRESSURE: 86 MMHG | TEMPERATURE: 98 F | OXYGEN SATURATION: 98 % | SYSTOLIC BLOOD PRESSURE: 126 MMHG | RESPIRATION RATE: 16 BRPM | HEART RATE: 58 BPM

## 2022-06-21 LAB
ALBUMIN SERPL ELPH-MCNC: 4.8 G/DL — SIGNIFICANT CHANGE UP (ref 3.3–5)
ALP SERPL-CCNC: 66 U/L — SIGNIFICANT CHANGE UP (ref 40–120)
ALT FLD-CCNC: 34 U/L — SIGNIFICANT CHANGE UP (ref 10–45)
ANION GAP SERPL CALC-SCNC: 10 MMOL/L — SIGNIFICANT CHANGE UP (ref 5–17)
AST SERPL-CCNC: 22 U/L — SIGNIFICANT CHANGE UP (ref 10–40)
BASOPHILS # BLD AUTO: 0.03 K/UL — SIGNIFICANT CHANGE UP (ref 0–0.2)
BASOPHILS NFR BLD AUTO: 0.6 % — SIGNIFICANT CHANGE UP (ref 0–2)
BILIRUB SERPL-MCNC: 0.4 MG/DL — SIGNIFICANT CHANGE UP (ref 0.2–1.2)
BUN SERPL-MCNC: 16 MG/DL — SIGNIFICANT CHANGE UP (ref 7–23)
CALCIUM SERPL-MCNC: 9.4 MG/DL — SIGNIFICANT CHANGE UP (ref 8.4–10.5)
CHLORIDE SERPL-SCNC: 103 MMOL/L — SIGNIFICANT CHANGE UP (ref 96–108)
CO2 SERPL-SCNC: 28 MMOL/L — SIGNIFICANT CHANGE UP (ref 22–31)
CREAT SERPL-MCNC: 0.89 MG/DL — SIGNIFICANT CHANGE UP (ref 0.5–1.3)
EGFR: 104 ML/MIN/1.73M2 — SIGNIFICANT CHANGE UP
EOSINOPHIL # BLD AUTO: 0.14 K/UL — SIGNIFICANT CHANGE UP (ref 0–0.5)
EOSINOPHIL NFR BLD AUTO: 2.7 % — SIGNIFICANT CHANGE UP (ref 0–6)
GLUCOSE SERPL-MCNC: 120 MG/DL — HIGH (ref 70–99)
HCT VFR BLD CALC: 44.6 % — SIGNIFICANT CHANGE UP (ref 39–50)
HGB BLD-MCNC: 15 G/DL — SIGNIFICANT CHANGE UP (ref 13–17)
IMM GRANULOCYTES NFR BLD AUTO: 0.4 % — SIGNIFICANT CHANGE UP (ref 0–1.5)
LYMPHOCYTES # BLD AUTO: 2.45 K/UL — SIGNIFICANT CHANGE UP (ref 1–3.3)
LYMPHOCYTES # BLD AUTO: 46.4 % — HIGH (ref 13–44)
MAGNESIUM SERPL-MCNC: 2 MG/DL — SIGNIFICANT CHANGE UP (ref 1.6–2.6)
MCHC RBC-ENTMCNC: 29.6 PG — SIGNIFICANT CHANGE UP (ref 27–34)
MCHC RBC-ENTMCNC: 33.6 GM/DL — SIGNIFICANT CHANGE UP (ref 32–36)
MCV RBC AUTO: 88.1 FL — SIGNIFICANT CHANGE UP (ref 80–100)
MONOCYTES # BLD AUTO: 0.46 K/UL — SIGNIFICANT CHANGE UP (ref 0–0.9)
MONOCYTES NFR BLD AUTO: 8.7 % — SIGNIFICANT CHANGE UP (ref 2–14)
NEUTROPHILS # BLD AUTO: 2.18 K/UL — SIGNIFICANT CHANGE UP (ref 1.8–7.4)
NEUTROPHILS NFR BLD AUTO: 41.2 % — LOW (ref 43–77)
NRBC # BLD: 0 /100 WBCS — SIGNIFICANT CHANGE UP (ref 0–0)
PLATELET # BLD AUTO: 275 K/UL — SIGNIFICANT CHANGE UP (ref 150–400)
POTASSIUM SERPL-MCNC: 4.2 MMOL/L — SIGNIFICANT CHANGE UP (ref 3.5–5.3)
POTASSIUM SERPL-SCNC: 4.2 MMOL/L — SIGNIFICANT CHANGE UP (ref 3.5–5.3)
PROT SERPL-MCNC: 7.2 G/DL — SIGNIFICANT CHANGE UP (ref 6–8.3)
RBC # BLD: 5.06 M/UL — SIGNIFICANT CHANGE UP (ref 4.2–5.8)
RBC # FLD: 12.5 % — SIGNIFICANT CHANGE UP (ref 10.3–14.5)
SODIUM SERPL-SCNC: 141 MMOL/L — SIGNIFICANT CHANGE UP (ref 135–145)
TROPONIN T, HIGH SENSITIVITY RESULT: <6 NG/L — SIGNIFICANT CHANGE UP (ref 0–51)
TSH SERPL-MCNC: 3.74 UIU/ML — SIGNIFICANT CHANGE UP (ref 0.27–4.2)
WBC # BLD: 5.28 K/UL — SIGNIFICANT CHANGE UP (ref 3.8–10.5)
WBC # FLD AUTO: 5.28 K/UL — SIGNIFICANT CHANGE UP (ref 3.8–10.5)

## 2022-06-21 PROCEDURE — 71046 X-RAY EXAM CHEST 2 VIEWS: CPT

## 2022-06-21 PROCEDURE — 99285 EMERGENCY DEPT VISIT HI MDM: CPT | Mod: 25

## 2022-06-21 PROCEDURE — 80053 COMPREHEN METABOLIC PANEL: CPT

## 2022-06-21 PROCEDURE — 84443 ASSAY THYROID STIM HORMONE: CPT

## 2022-06-21 PROCEDURE — 71046 X-RAY EXAM CHEST 2 VIEWS: CPT | Mod: 26

## 2022-06-21 PROCEDURE — 84484 ASSAY OF TROPONIN QUANT: CPT

## 2022-06-21 PROCEDURE — 83735 ASSAY OF MAGNESIUM: CPT

## 2022-06-21 PROCEDURE — 93005 ELECTROCARDIOGRAM TRACING: CPT

## 2022-06-21 PROCEDURE — 85025 COMPLETE CBC W/AUTO DIFF WBC: CPT

## 2022-06-21 NOTE — ED PROVIDER NOTE - OBJECTIVE STATEMENT
Attendinyo male presents with chest pain which happened a few times while watching TV earlier today.  no fever. no shortness of breath with the pain.  no exertional symptoms.  occurred about 1 hour prior to coming to the ED today.  had some nausea but that has resolved.

## 2022-06-21 NOTE — ED PROVIDER NOTE - PATIENT PORTAL LINK FT
You can access the FollowMyHealth Patient Portal offered by [facility] by registering at the following website: [email or web address]. By joining [employer]’s FollowMyHealth portal, you will also be able to view your health information using other applications (apps) compatible with our system.

## 2022-06-21 NOTE — ED PROVIDER NOTE - NSFOLLOWUPINSTRUCTIONS_ED_ALL_ED_FT
Follow up with your primary care doctor in 2-3 days.    Return to the ED for worsening chest pain, shortness of breath, or other emergent concerns.

## 2022-06-21 NOTE — ED PROVIDER NOTE - CLINICAL SUMMARY MEDICAL DECISION MAKING FREE TEXT BOX
Chest pain.  low risk for ACS.  will check EKG, troponin, chest xray and reassess.  low heart score, will recommend outpatient followup with PCP for further evaluation of chest pain.

## 2022-06-21 NOTE — ED ADULT NURSE NOTE - OBJECTIVE STATEMENT
Pt is Pt is a 51y M c/o chest pain and palpitations. Pt states approx 6 hours ago he had an episode of chest pain lasting 20min that subsided and he began experiencing a ha. Pt denies current chest pain, endorses some palpitations. Pt endorses mild nausea. Pt denies fever, chills, cough, dizziness, vision changes, vomiting, abd pain. Pt is A&Ox3, breathing unlabored and spontaneous, abd soft nontender nondistended, full strength and ROM of all extremities. Pt resting in stretcher, placed on cardiac monitor, bed in lowest position, aware of plan of care. Comfort and safety measures maintained.

## 2022-07-08 ENCOUNTER — APPOINTMENT (OUTPATIENT)
Dept: FAMILY MEDICINE | Facility: CLINIC | Age: 52
End: 2022-07-08

## 2022-07-12 ENCOUNTER — APPOINTMENT (OUTPATIENT)
Dept: PLASTIC SURGERY | Facility: CLINIC | Age: 52
End: 2022-07-12

## 2022-07-12 VITALS
TEMPERATURE: 98 F | WEIGHT: 220 LBS | OXYGEN SATURATION: 97 % | HEART RATE: 74 BPM | HEIGHT: 70 IN | SYSTOLIC BLOOD PRESSURE: 139 MMHG | BODY MASS INDEX: 31.5 KG/M2 | DIASTOLIC BLOOD PRESSURE: 79 MMHG

## 2022-07-12 PROCEDURE — 99213 OFFICE O/P EST LOW 20 MIN: CPT

## 2022-07-12 NOTE — CONSULT LETTER
[Dear  ___] : Dear  [unfilled], [Consult Letter:] : I had the pleasure of evaluating your patient, [unfilled]. [Please see my note below.] : Please see my note below. [Consult Closing:] : Thank you very much for allowing me to participate in the care of this patient.  If you have any questions, please do not hesitate to contact me. [Sincerely,] : Sincerely, [FreeTextEntry3] : Jensen Nguyen MD

## 2022-07-13 NOTE — ED ADULT NURSE NOTE - NSFALLRSKINDICATORS_ED_ALL_ED
TRANSFER - OUT REPORT:    Verbal report given to [address], [address] on Jessica Chin  being transferred to Room 552for routine progression of care       Report consisted of patients Situation, Background, Assessment and   Recommendations(SBAR). Time Pre op antibiotic given:0901  Anesthesia Stop time: 5699  Alvarez Present on Transfer to floor:no  Order for Alvarez on Chart:no  Discharge Prescriptions with Chart:no    Information from the following report(s) Procedure Summary, Intake/Output and MAR was reviewed with the receiving nurse. Opportunity for questions and clarification was provided. Is the patient on 02? NO       L/Min        Other     Is the patient on a monitor? no    Is the nurse transporting with the patient? NO    Surgical Waiting Area notified of patient's transfer from PACU? YES      The following personal items collected during your admission accompanied patient upon transfer:   Dental Appliance: Dental Appliances: None  Vision: Visual Aid: At home  Hearing Aid:    Jewelry: Jewelry: None  Clothing: Clothing: At bedside (Returned in PACU)  Other Valuables:  Other Valuables: None  Valuables sent to safe: no

## 2022-07-18 ENCOUNTER — APPOINTMENT (OUTPATIENT)
Dept: FAMILY MEDICINE | Facility: CLINIC | Age: 52
End: 2022-07-18

## 2022-07-20 ENCOUNTER — OUTPATIENT (OUTPATIENT)
Dept: OUTPATIENT SERVICES | Facility: HOSPITAL | Age: 52
LOS: 1 days | End: 2022-07-20
Payer: MEDICAID

## 2022-07-20 VITALS
OXYGEN SATURATION: 97 % | TEMPERATURE: 97 F | SYSTOLIC BLOOD PRESSURE: 117 MMHG | RESPIRATION RATE: 18 BRPM | HEART RATE: 67 BPM | HEIGHT: 69 IN | WEIGHT: 221.79 LBS | DIASTOLIC BLOOD PRESSURE: 76 MMHG

## 2022-07-20 DIAGNOSIS — Z01.818 ENCOUNTER FOR OTHER PREPROCEDURAL EXAMINATION: ICD-10-CM

## 2022-07-20 DIAGNOSIS — R22.2 LOCALIZED SWELLING, MASS AND LUMP, TRUNK: ICD-10-CM

## 2022-07-20 DIAGNOSIS — Z98.890 OTHER SPECIFIED POSTPROCEDURAL STATES: Chronic | ICD-10-CM

## 2022-07-20 DIAGNOSIS — M67.432 GANGLION, LEFT WRIST: Chronic | ICD-10-CM

## 2022-07-20 LAB
ALBUMIN SERPL ELPH-MCNC: 4.2 G/DL — SIGNIFICANT CHANGE UP (ref 3.3–5)
ALP SERPL-CCNC: 79 U/L — SIGNIFICANT CHANGE UP (ref 40–120)
ALT FLD-CCNC: 104 U/L — HIGH (ref 10–45)
ANION GAP SERPL CALC-SCNC: 7 MMOL/L — SIGNIFICANT CHANGE UP (ref 5–17)
AST SERPL-CCNC: 59 U/L — HIGH (ref 10–40)
BILIRUB SERPL-MCNC: 0.6 MG/DL — SIGNIFICANT CHANGE UP (ref 0.2–1.2)
BUN SERPL-MCNC: 20 MG/DL — SIGNIFICANT CHANGE UP (ref 7–23)
CALCIUM SERPL-MCNC: 8.8 MG/DL — SIGNIFICANT CHANGE UP (ref 8.4–10.5)
CHLORIDE SERPL-SCNC: 105 MMOL/L — SIGNIFICANT CHANGE UP (ref 96–108)
CO2 SERPL-SCNC: 27 MMOL/L — SIGNIFICANT CHANGE UP (ref 22–31)
CREAT SERPL-MCNC: 0.83 MG/DL — SIGNIFICANT CHANGE UP (ref 0.5–1.3)
EGFR: 107 ML/MIN/1.73M2 — SIGNIFICANT CHANGE UP
GLUCOSE SERPL-MCNC: 117 MG/DL — HIGH (ref 70–99)
HCT VFR BLD CALC: 44.7 % — SIGNIFICANT CHANGE UP (ref 39–50)
HGB BLD-MCNC: 15.4 G/DL — SIGNIFICANT CHANGE UP (ref 13–17)
MCHC RBC-ENTMCNC: 30.3 PG — SIGNIFICANT CHANGE UP (ref 27–34)
MCHC RBC-ENTMCNC: 34.5 GM/DL — SIGNIFICANT CHANGE UP (ref 32–36)
MCV RBC AUTO: 87.8 FL — SIGNIFICANT CHANGE UP (ref 80–100)
NRBC # BLD: 0 /100 WBCS — SIGNIFICANT CHANGE UP (ref 0–0)
PLATELET # BLD AUTO: 352 K/UL — SIGNIFICANT CHANGE UP (ref 150–400)
POTASSIUM SERPL-MCNC: 4 MMOL/L — SIGNIFICANT CHANGE UP (ref 3.5–5.3)
POTASSIUM SERPL-SCNC: 4 MMOL/L — SIGNIFICANT CHANGE UP (ref 3.5–5.3)
PROT SERPL-MCNC: 7.7 G/DL — SIGNIFICANT CHANGE UP (ref 6–8.3)
RBC # BLD: 5.09 M/UL — SIGNIFICANT CHANGE UP (ref 4.2–5.8)
RBC # FLD: 12.1 % — SIGNIFICANT CHANGE UP (ref 10.3–14.5)
SODIUM SERPL-SCNC: 139 MMOL/L — SIGNIFICANT CHANGE UP (ref 135–145)
WBC # BLD: 4.9 K/UL — SIGNIFICANT CHANGE UP (ref 3.8–10.5)
WBC # FLD AUTO: 4.9 K/UL — SIGNIFICANT CHANGE UP (ref 3.8–10.5)

## 2022-07-20 PROCEDURE — 80053 COMPREHEN METABOLIC PANEL: CPT

## 2022-07-20 PROCEDURE — 85027 COMPLETE CBC AUTOMATED: CPT

## 2022-07-20 PROCEDURE — 36415 COLL VENOUS BLD VENIPUNCTURE: CPT

## 2022-07-20 PROCEDURE — G0463: CPT

## 2022-07-20 RX ORDER — SODIUM CHLORIDE 9 MG/ML
1000 INJECTION, SOLUTION INTRAVENOUS
Refills: 0 | Status: DISCONTINUED | OUTPATIENT
Start: 2022-08-03 | End: 2022-08-17

## 2022-07-20 NOTE — H&P PST ADULT - PROBLEM SELECTOR PLAN 1
Excisional biopsy left wrist, possible local flap     CBC CMP    Preop instructions and antibacterial soap given and explained (verbal and written), with teach back. Scheduled for resection right lower back mass with Dr Sal and Dr Nguyen on 08/03/2022.  COVID-19 testing information provided.   Pre op instructions given and patient verbalized understanding.  CBC, CMP pending.  EKG and chest x ray on chart.  NPO after midnight night before procedure.  To stop all ASA, NSAIDs, vitamins and supplements 1 week prior to procedure.  Chlorhexidine wash given with instructions.  MERI precautions - STOP BANG 3

## 2022-07-20 NOTE — H&P PST ADULT - ATTENDING COMMENTS
51-year-old man with a soft tissue mass of the lower right back, which is uncomfortable when he sits.    He is scheduled for excision, with plastics closure (Dr. Jensen Nguyen).    Discussed with him and his wife prior to surgery, and again on day of operation.    All questions answered, consent on chart

## 2022-07-20 NOTE — H&P PST ADULT - NSICDXPASTSURGICALHX_GEN_ALL_CORE_FT
PAST SURGICAL HISTORY:  Ganglion cyst of wrist, left excision 2019 and 4/2021    H/O excision of mass left thigh benign    History of drainage of abscess 2012 anorectal    History of excision of mass right leg 6/2018, left wrist 11/2021    S/P colonoscopy 2012    S/P hernia repair umbilical - mesh

## 2022-07-20 NOTE — H&P PST ADULT - NSANTHOSAYNRD_GEN_A_CORE
neck 17.5 inches/No. MERI screening performed.  STOP BANG Legend: 0-2 = LOW Risk; 3-4 = INTERMEDIATE Risk; 5-8 = HIGH Risk

## 2022-07-20 NOTE — H&P PST ADULT - NSICDXPASTMEDICALHX_GEN_ALL_CORE_FT
PAST MEDICAL HISTORY:  Anorectal abscess     Fatty liver     Ganglion cyst of wrist     History of 2019 novel coronavirus disease (COVID-19) 5/2022 - home test only-mild case    History of Clostridium difficile colitis 2012    Mass of arm, right x2 - right upper arm    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

## 2022-07-20 NOTE — H&P PST ADULT - FALL HARM RISK - UNIVERSAL INTERVENTIONS
Bed in lowest position, wheels locked, appropriate side rails in place/Call bell, personal items and telephone in reach/Instruct patient to call for assistance before getting out of bed or chair/Non-slip footwear when patient is out of bed/[place] to call system/Physically safe environment - no spills, clutter or unnecessary equipment/Purposeful Proactive Rounding/Room/bathroom lighting operational, light cord in reach

## 2022-07-20 NOTE — H&P PST ADULT - HISTORY OF PRESENT ILLNESS
50 y/o male  52 y/o male with PMH of fatty liver presents for PSt.  C/o lower back pain due to deep tissue mass that he felt at home.  Was evaluated with sonogram and recommended for surgical removal.  Feeling well at PST to day with no c/o of cough, fever or recent illness.   Scheduled for resection right lower back mass with Dr Sal and Dr Nguyen on 08/03/2022.  COVID-19 testing information provided.

## 2022-07-22 ENCOUNTER — NON-APPOINTMENT (OUTPATIENT)
Age: 52
End: 2022-07-22

## 2022-07-25 ENCOUNTER — OUTPATIENT (OUTPATIENT)
Dept: OUTPATIENT SERVICES | Facility: HOSPITAL | Age: 52
LOS: 1 days | End: 2022-07-25
Payer: MEDICAID

## 2022-07-25 ENCOUNTER — APPOINTMENT (OUTPATIENT)
Dept: INTERNAL MEDICINE | Facility: CLINIC | Age: 52
End: 2022-07-25

## 2022-07-25 VITALS
WEIGHT: 225 LBS | HEIGHT: 70 IN | SYSTOLIC BLOOD PRESSURE: 124 MMHG | HEART RATE: 73 BPM | OXYGEN SATURATION: 96 % | DIASTOLIC BLOOD PRESSURE: 78 MMHG | BODY MASS INDEX: 32.21 KG/M2

## 2022-07-25 DIAGNOSIS — I10 ESSENTIAL (PRIMARY) HYPERTENSION: ICD-10-CM

## 2022-07-25 DIAGNOSIS — Z98.890 OTHER SPECIFIED POSTPROCEDURAL STATES: Chronic | ICD-10-CM

## 2022-07-25 DIAGNOSIS — Z87.898 PERSONAL HISTORY OF OTHER SPECIFIED CONDITIONS: ICD-10-CM

## 2022-07-25 DIAGNOSIS — Z01.818 ENCOUNTER FOR OTHER PREPROCEDURAL EXAMINATION: ICD-10-CM

## 2022-07-25 DIAGNOSIS — M67.432 GANGLION, LEFT WRIST: Chronic | ICD-10-CM

## 2022-07-25 DIAGNOSIS — Z86.39 PERSONAL HISTORY OF OTHER ENDOCRINE, NUTRITIONAL AND METABOLIC DISEASE: ICD-10-CM

## 2022-07-25 DIAGNOSIS — Z87.19 PERSONAL HISTORY OF OTHER DISEASES OF THE DIGESTIVE SYSTEM: ICD-10-CM

## 2022-07-25 PROCEDURE — G0463: CPT

## 2022-07-25 PROCEDURE — 86706 HEP B SURFACE ANTIBODY: CPT

## 2022-07-25 PROCEDURE — 86803 HEPATITIS C AB TEST: CPT

## 2022-07-25 PROCEDURE — 99213 OFFICE O/P EST LOW 20 MIN: CPT | Mod: GE

## 2022-07-25 PROCEDURE — 80053 COMPREHEN METABOLIC PANEL: CPT

## 2022-07-25 RX ORDER — FLUOXETINE HYDROCHLORIDE 20 MG/1
20 TABLET ORAL DAILY
Qty: 30 | Refills: 1 | Status: DISCONTINUED | COMMUNITY
Start: 2021-12-27 | End: 2022-07-25

## 2022-07-26 PROBLEM — Z86.16 PERSONAL HISTORY OF COVID-19: Chronic | Status: ACTIVE | Noted: 2022-07-20

## 2022-07-26 NOTE — ASSESSMENT
[High Risk Surgery - Intraperitoneal, Intrathoracic or Supringuinal Vascular Procedures] : High Risk Surgery - Intraperitoneal, Intrathoracic or Supringuinal Vascular Procedures - No (0) [Ischemic Heart Disease] : Ischemic Heart Disease - No (0) [Congestive Heart Failure] : Congestive Heart Failure - No (0) [Prior Cerebrovascular Accident or TIA] : Prior Cerebrovascular Accident or TIA - No (0) [Creatinine >= 2mg/dL (1 Point)] : Creatinine >= 2mg/dL - No (0) [Insulin-dependent Diabetic (1 Point)] : Insulin-dependent Diabetic - No (0) [0] : 0 , RCRI Class: I, Risk of Post-Op Cardiac Complications: 3.9%, 95% CI for Risk Estimate: 2.8% - 5.4% [Patient Optimized for Surgery] : Patient optimized for surgery [No Further Testing Recommended] : no further testing recommended [As per surgery] : as per surgery [FreeTextEntry4] : 51M with a hx L thigh schwannoma & R leg dermatofibroma s/p resections, HLD, depression, former 10 pack year smoker, obesity, hx of back pain found to have new mass on right lower back presents to clinic for medical clearance. EKG NSR without ST segment changes, no Q waves. TWI in lead III and V1, however does not follow territory. CXR performed on 6/21/22 with clear lungs. Patient with RCRI 0, Class 1 risk, medically optimized for low risk surgical procedure.\par \par # transaminitis\par - mild transaminitis (AST 59, ) noted on 7/20/22 labs, asymptomatic, however elevated compared to liver function test in December 2021\par - repeat CMP today, check hepatitis C and B\par \par RTC in 5 weeks for CPE.\par d/w Dr. Ye\par \par Angelina Urrutia MD\par Internal Medicine PGY-2

## 2022-07-26 NOTE — HISTORY OF PRESENT ILLNESS
[No Adverse Anesthesia Reaction] : no adverse anesthesia reaction in self or family member [(Patient denies any chest pain, claudication, dyspnea on exertion, orthopnea, palpitations or syncope)] : Patient denies any chest pain, claudication, dyspnea on exertion, orthopnea, palpitations or syncope [Excellent (>10 METs)] : Excellent (>10 METs) [No Pertinent Cardiac History] : no history of aortic stenosis, atrial fibrillation, coronary artery disease, recent myocardial infarction, or implantable device/pacemaker [No Pertinent Pulmonary History] : no history of asthma, COPD, sleep apnea, or smoking [Aortic Stenosis] : no aortic stenosis [Atrial Fibrillation] : no atrial fibrillation [Coronary Artery Disease] : no coronary artery disease [Recent Myocardial Infarction] : no recent myocardial infarction [Implantable Device/Pacemaker] : no implantable device/pacemaker [Asthma] : no asthma [COPD] : no COPD [Sleep Apnea] : no sleep apnea [Smoker] : not a smoker [Family Member] : no family member with adverse anesthesia reaction/sudden death [Self] : no previous adverse anesthesia reaction [Chronic Anticoagulation] : no chronic anticoagulation [Chronic Kidney Disease] : no chronic kidney disease [Diabetes] : no diabetes [FreeTextEntry1] : resection of right lower back lipoma  [FreeTextEntry2] : 8/3/22 [FreeTextEntry3] : Dr. Sal, Dr. Nguyen [FreeTextEntry4] : 51M with a hx L thigh schwannoma & R leg dermatofibroma s/p resections, HLD, depression, former 10 pack year smoker, obesity, hx of back pain found to have new mass on right lower back presents to clinic for medical clearance. Patient with recent acute visits for back pain. Obtained chest tissue ultrasound for further evaluation, was found to have 2.7x1.4x0.6 cm echogenic area within subcutaneous fat, no flow in lesion. Likely lipoma. Patient elected for resection with plastic surgery and surg onc. Patient reports intermittent back pain well controlled with topical diclofenac. Tries not to use NSAIDS or any other painkillers. Otherwise denies chest pain or discomfort, SOB, ELIZABETH, abdominal pain, urinary complaints.\par \par Of note patient hasn't had annual comprehensive physical since 2020. Patient amenable to returning in 5 weeks for CPE.

## 2022-07-27 LAB
ALBUMIN SERPL ELPH-MCNC: 4.8 G/DL
ALP BLD-CCNC: 69 U/L
ALT SERPL-CCNC: 67 U/L
ANION GAP SERPL CALC-SCNC: 10 MMOL/L
AST SERPL-CCNC: 39 U/L
BILIRUB SERPL-MCNC: 0.3 MG/DL
BUN SERPL-MCNC: 14 MG/DL
CALCIUM SERPL-MCNC: 9.9 MG/DL
CHLORIDE SERPL-SCNC: 104 MMOL/L
CO2 SERPL-SCNC: 26 MMOL/L
CREAT SERPL-MCNC: 0.85 MG/DL
EGFR: 106 ML/MIN/1.73M2
GLUCOSE SERPL-MCNC: 96 MG/DL
HBV SURFACE AB SER QL: NONREACTIVE
HCV AB SER QL: NONREACTIVE
HCV S/CO RATIO: 0.09 S/CO
POTASSIUM SERPL-SCNC: 4.7 MMOL/L
PROT SERPL-MCNC: 7.2 G/DL
SODIUM SERPL-SCNC: 140 MMOL/L

## 2022-07-30 ENCOUNTER — LABORATORY RESULT (OUTPATIENT)
Age: 52
End: 2022-07-30

## 2022-08-02 ENCOUNTER — TRANSCRIPTION ENCOUNTER (OUTPATIENT)
Age: 52
End: 2022-08-02

## 2022-08-02 NOTE — ASU DISCHARGE PLAN (ADULT/PEDIATRIC) - NS MD DC FALL RISK RISK
For information on Fall & Injury Prevention, visit: [email or web address] OR  [email or web address] OR  https://www.cdc.gov/steadi/patient.html

## 2022-08-02 NOTE — ASU DISCHARGE PLAN (ADULT/PEDIATRIC) - CARE PROVIDER_API CALL
Adam Hayden)  Physician Assistant Services  [address], [address]  [place] [address]  Phone: [phone]  Fax: [phone]  Scheduled Appointment: 08/09/2022 10:15 AM

## 2022-08-02 NOTE — ASU DISCHARGE PLAN (ADULT/PEDIATRIC) - BATHING
Take quick showers starting tomorrow./Shower only Take quick showers starting tomorrow./Shower only/Do not submerge in water

## 2022-08-02 NOTE — ASU DISCHARGE PLAN (ADULT/PEDIATRIC) - ACTIVITY LEVEL
No excercise/No heavy lifting/No sports/gym Do NOT bend or stretch at the waist, no heavy lifting, avoid exercise or any strenuous activity./No excercise/No heavy lifting/No sports/gym/No tub baths/No intercourse

## 2022-08-02 NOTE — ASU DISCHARGE PLAN (ADULT/PEDIATRIC) - COMMENTS
Dr. Nguyen's  direct phone number is [phone]. If after office hours (9-5PM M-F), then please wait until normal business hours to call.   You may leave a detailed VM as well. You may also email [email or web address] for any concerns or questions regarding scheduling appointments.  You will see Dr. Nguyen's VINNIE Romo, (Adam [place]) for your post operative visit.  You may also contact her directly via email: [email or web address] for any concerns or questions.

## 2022-08-02 NOTE — ASU DISCHARGE PLAN (ADULT/PEDIATRIC) - ASU DC SPECIAL INSTRUCTIONSFT
Initial followup with plastic surgery in the next 5-15 days, regarding matters of bandages, activities, and showering.    Dr. Sal should call with pathology report in approximately 2 weeks.  The conversation will determine further management. 1. Please follow up with Dr. Nguyen's PA, Clarissa, next week TUESDAY 08/09/2022 10:15am for your first post operative visit. Your appointment has already been made. Please call the office if you need to make any changes to your appointment at [phone] (during normal business hours M-F 9-5pm).     Dr. Sal will call with pathology results in approximately 2 weeks, the conversation will determine further management and follow up.    2. Activity:   Please avoid any strenuous activities, AVOID bending, stretching, reaching overhead, or any heavy lifting.    3. Dressings:  Please keep your dressings on until next week. It is waterproof, so you can take a QUICK shower starting tomorrow.  Some OOZING and blood on the dressing is normal and to be expected. If it becomes saturated w/ BRIGHT red blood that does not stop, please call [phone] for email CLARISSA: [email or web address]    4. Medications:  Your medications were sent to your pharmacy.  Please take the prescribed medications as directed.   For MILD pain please take regular over the counter pain medications such as: Advil, Tylenol, Motrin.   Only take the narcotic prescribed pain medication for SEVERE PAIN.   If constipation occurs after taking narcotic pain medications, please take an over the counter stool softener. 1. Please follow up with Dr. Nguyen's PA, Clarissa, next week TUESDAY 08/09/2022 10:15am for your first post operative visit. Your appointment has already been made. Please call the office if you need to make any changes to your appointment at [phone] (during normal business hours M-F 9-5pm).   -  Dr. Sal will call with pathology results in approximately 2 weeks, the conversation will determine further management and follow up.  -  2. Activity:   Please avoid any strenuous activities, AVOID bending, stretching, reaching overhead, or any heavy lifting.  Avoid bending down, and any stretching of the area.  -  3. Dressings:  Please do not remove the dressing. Keep everything the way it is until next week's visit.  Please keep your dressings on until next week. It is waterproof, so you can take a QUICK shower starting tomorrow.  Some OOZING and blood on the dressing is normal and to be expected. If it becomes saturated w/ BRIGHT red blood that does not stop, please call [phone] for email CLARISSA: [email or web address]  -  4. Medications:  Your medications were sent to your pharmacy.  Please take the prescribed medications as directed.   For MILD pain please take regular over the counter pain medications such as: Advil, Tylenol, Motrin.   Only take the narcotic prescribed pain medication for SEVERE PAIN.   If constipation occurs after taking narcotic pain medications, please take an over the counter stool softener.

## 2022-08-03 ENCOUNTER — TRANSCRIPTION ENCOUNTER (OUTPATIENT)
Age: 52
End: 2022-08-03

## 2022-08-03 ENCOUNTER — OUTPATIENT (OUTPATIENT)
Dept: OUTPATIENT SERVICES | Facility: HOSPITAL | Age: 52
LOS: 1 days | End: 2022-08-03
Payer: MEDICAID

## 2022-08-03 ENCOUNTER — APPOINTMENT (OUTPATIENT)
Dept: SURGICAL ONCOLOGY | Facility: HOSPITAL | Age: 52
End: 2022-08-03

## 2022-08-03 ENCOUNTER — APPOINTMENT (OUTPATIENT)
Dept: PLASTIC SURGERY | Facility: HOSPITAL | Age: 52
End: 2022-08-03

## 2022-08-03 ENCOUNTER — RESULT REVIEW (OUTPATIENT)
Age: 52
End: 2022-08-03

## 2022-08-03 VITALS
DIASTOLIC BLOOD PRESSURE: 78 MMHG | RESPIRATION RATE: 16 BRPM | OXYGEN SATURATION: 96 % | TEMPERATURE: 98 F | HEIGHT: 69 IN | HEART RATE: 59 BPM | WEIGHT: 221.79 LBS | SYSTOLIC BLOOD PRESSURE: 124 MMHG

## 2022-08-03 VITALS
OXYGEN SATURATION: 91 % | RESPIRATION RATE: 18 BRPM | DIASTOLIC BLOOD PRESSURE: 75 MMHG | SYSTOLIC BLOOD PRESSURE: 114 MMHG | HEART RATE: 56 BPM | TEMPERATURE: 98 F

## 2022-08-03 DIAGNOSIS — Z98.890 OTHER SPECIFIED POSTPROCEDURAL STATES: Chronic | ICD-10-CM

## 2022-08-03 DIAGNOSIS — M67.432 GANGLION, LEFT WRIST: Chronic | ICD-10-CM

## 2022-08-03 DIAGNOSIS — R22.2 LOCALIZED SWELLING, MASS AND LUMP, TRUNK: ICD-10-CM

## 2022-08-03 PROCEDURE — 15734 MUSCLE-SKIN GRAFT TRUNK: CPT

## 2022-08-03 PROCEDURE — C1889: CPT

## 2022-08-03 PROCEDURE — 88304 TISSUE EXAM BY PATHOLOGIST: CPT

## 2022-08-03 PROCEDURE — 15734 MUSCLE-SKIN GRAFT TRUNK: CPT | Mod: XP

## 2022-08-03 PROCEDURE — 21932 EXC BACK TUM DEEP < 5 CM: CPT

## 2022-08-03 PROCEDURE — 21930 EXC BACK LES SC < 3 CM: CPT

## 2022-08-03 PROCEDURE — 88304 TISSUE EXAM BY PATHOLOGIST: CPT | Mod: 26

## 2022-08-03 DEVICE — HEMOSTAT ARISTA 3GR: Type: IMPLANTABLE DEVICE | Status: FUNCTIONAL

## 2022-08-03 RX ORDER — HYDROMORPHONE HYDROCHLORIDE 2 MG/ML
0.5 INJECTION INTRAMUSCULAR; INTRAVENOUS; SUBCUTANEOUS
Refills: 0 | Status: DISCONTINUED | OUTPATIENT
Start: 2022-08-03 | End: 2022-08-03

## 2022-08-03 RX ORDER — OXYCODONE AND ACETAMINOPHEN 5; 325 MG/1; MG/1
1 TABLET ORAL
Qty: 12 | Refills: 0
Start: 2022-08-03 | End: 2022-08-05

## 2022-08-03 RX ORDER — OXYCODONE AND ACETAMINOPHEN 5; 325 MG/1; MG/1
1 TABLET ORAL EVERY 4 HOURS
Refills: 0 | Status: DISCONTINUED | OUTPATIENT
Start: 2022-08-03 | End: 2022-08-03

## 2022-08-03 RX ORDER — SODIUM CHLORIDE 9 MG/ML
1000 INJECTION, SOLUTION INTRAVENOUS
Refills: 0 | Status: DISCONTINUED | OUTPATIENT
Start: 2022-08-03 | End: 2022-08-03

## 2022-08-03 RX ORDER — HEPARIN SODIUM 5000 [USP'U]/ML
5000 INJECTION INTRAVENOUS; SUBCUTANEOUS ONCE
Refills: 0 | Status: COMPLETED | OUTPATIENT
Start: 2022-08-03 | End: 2022-08-03

## 2022-08-03 RX ORDER — ONDANSETRON 8 MG/1
4 TABLET, FILM COATED ORAL ONCE
Refills: 0 | Status: DISCONTINUED | OUTPATIENT
Start: 2022-08-03 | End: 2022-08-03

## 2022-08-03 RX ADMIN — HEPARIN SODIUM 5000 UNIT(S): 5000 INJECTION INTRAVENOUS; SUBCUTANEOUS at 07:18

## 2022-08-03 RX ADMIN — SODIUM CHLORIDE 50 MILLILITER(S): 9 INJECTION, SOLUTION INTRAVENOUS at 05:59

## 2022-08-03 RX ADMIN — HYDROMORPHONE HYDROCHLORIDE 0.5 MILLIGRAM(S): 2 INJECTION INTRAMUSCULAR; INTRAVENOUS; SUBCUTANEOUS at 09:27

## 2022-08-03 RX ADMIN — HYDROMORPHONE HYDROCHLORIDE 0.5 MILLIGRAM(S): 2 INJECTION INTRAMUSCULAR; INTRAVENOUS; SUBCUTANEOUS at 11:58

## 2022-08-03 NOTE — BRIEF OPERATIVE NOTE - NSICDXBRIEFPREOP_GEN_ALL_CORE_FT
PRE-OP DIAGNOSIS:  Palpable mass of lower back 03-Aug-2022 07:01:00  Adam Hayden  
PRE-OP DIAGNOSIS:  Palpable mass of lower back 03-Aug-2022 07:01:00  Adam Hayden

## 2022-08-03 NOTE — BRIEF OPERATIVE NOTE - NSICDXBRIEFPOSTOP_GEN_ALL_CORE_FT
POST-OP DIAGNOSIS:  Palpable mass of lower back 03-Aug-2022 07:01:11  Adam Hayden  
POST-OP DIAGNOSIS:  Palpable mass of lower back 03-Aug-2022 07:01:11  Adam Hayden

## 2022-08-03 NOTE — BRIEF OPERATIVE NOTE - OPERATION/FINDINGS
I was present for the entire length of the case, there was no other qualified resident to assist  I assisted with hemostasis, exposure, creating of flaps, closure of wound, and placement of dressings. I was present for the entire length of the case, there was no other qualified resident to assist  I assisted with hemostasis, exposure, creating of flaps, closure of wound, and placement of dressings.    complex closure of back wound s/p mass excisional biopsy.

## 2022-08-03 NOTE — ASU PATIENT PROFILE, ADULT - FALL HARM RISK - UNIVERSAL INTERVENTIONS
Bed in lowest position, wheels locked, appropriate side rails in place/Call bell, personal items and telephone in reach/Instruct patient to call for assistance before getting out of bed or chair/Non-slip footwear when patient is out of bed/[place] to call system/Physically safe environment - no spills, clutter or unnecessary equipment/Purposeful Proactive Rounding/Room/bathroom lighting operational, light cord in reach

## 2022-08-05 DIAGNOSIS — R74.01 ELEVATION OF LEVELS OF LIVER TRANSAMINASE LEVELS: ICD-10-CM

## 2022-08-05 DIAGNOSIS — Z01.818 ENCOUNTER FOR OTHER PREPROCEDURAL EXAMINATION: ICD-10-CM

## 2022-08-09 ENCOUNTER — APPOINTMENT (OUTPATIENT)
Dept: PLASTIC SURGERY | Facility: CLINIC | Age: 52
End: 2022-08-09

## 2022-08-09 VITALS
DIASTOLIC BLOOD PRESSURE: 71 MMHG | WEIGHT: 225 LBS | SYSTOLIC BLOOD PRESSURE: 116 MMHG | TEMPERATURE: 98 F | HEART RATE: 99 BPM | BODY MASS INDEX: 32.21 KG/M2 | HEIGHT: 70 IN | OXYGEN SATURATION: 97 %

## 2022-08-09 DIAGNOSIS — M54.50 LOW BACK PAIN, UNSPECIFIED: ICD-10-CM

## 2022-08-09 LAB — SURGICAL PATHOLOGY STUDY: SIGNIFICANT CHANGE UP

## 2022-08-09 PROCEDURE — 99024 POSTOP FOLLOW-UP VISIT: CPT

## 2022-08-15 ENCOUNTER — APPOINTMENT (OUTPATIENT)
Dept: PLASTIC SURGERY | Facility: CLINIC | Age: 52
End: 2022-08-15

## 2022-08-15 VITALS
DIASTOLIC BLOOD PRESSURE: 84 MMHG | BODY MASS INDEX: 32.21 KG/M2 | WEIGHT: 225 LBS | SYSTOLIC BLOOD PRESSURE: 133 MMHG | TEMPERATURE: 98.1 F | HEART RATE: 64 BPM | OXYGEN SATURATION: 96 % | HEIGHT: 70 IN

## 2022-08-15 PROCEDURE — 99024 POSTOP FOLLOW-UP VISIT: CPT

## 2022-08-16 NOTE — ASSESSMENT
[FreeTextEntry1] : The etiology of his tender lump of the right lower back is not apparent on my exam today.\par \par I have suggested a targeted ultrasound, and provide him with a prescription.\par \par We will speak after the above imaging.\par That conversation will guide further management.\par \par \par \par 6/9/2022:\par Summary note.\par May 22, 2022:\par Right back (lower) targeted ultrasound at [facility]:\par Palpable area corresponds to a 2.7 x 1.4 x 0.6 cm echogenic area in the subcutaneous fat thought to represent a lipoma.\par \par Before Memorial Day I spoke to his wife and reviewed the above information.\par \par Today, he called requesting that I schedule excision of the lump.\par Expectant management was another option I had offered him, but not his preference.\par Paperwork for surgical scheduling submitted.\par Will coordinate with plastics (Dr. Jensen Nguyen).\par \par \par 8/16/22.\par We spoke.\par August 3, 2022, he had resection of a's symptomatic soft tissue mass from the right lower back.\par Plastics: Dr. 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

## 2022-08-16 NOTE — HISTORY OF PRESENT ILLNESS
[de-identified] : ***Originally REFERRED FROM [facility]\par \par \par 51 year-old gentleman.\par \par When seen January 2021, he was doing well.\par \par CC:\par 2-week history of a tender lump of the right lower back.\par No preceding injury.\par No other specific or constitutional signs or symptoms.\par April 2021 he presented with with a tender mass of the upper RIGHT ARM.\par He had originally shown this area to me in May 2019.\par At that time it was ~2 cm diameter, subcutaneous in location, smooth, mobile, and nontender.\par April 2021 sonography did not suggest any significant change.\par Although I offered expectant management, he chose excision.\par \par May 2021:\par Excision of a 2.5 cm lipoma, and 3.5 cm angiolipoma, both from the proximal, posterior, right arm.\par Plastics: Dr. Jensen Nguyen.\par \par \par January 2022:\par He was asymptomatic with regards to the nodule on the right parieto-occipital scalp, the surgical site on the posterior upper right arm, medial right ankle, and posterior left thigh.\par He had no new complaints.\par \par \par + Known subcutaneous nodule of the right parietal/occipital scalp.\par \par \par + history of a schwannoma of his posterior LEFT THIGH \par \par \par February 2017:\par Resection of a 6 cm schwannoma from the posterior LEFT THIGH, with neurosurgery (Dr. George HERNANDES), and reconstruction by Dr. Jensen Nguyen.\par Postoperative course was prolonged by severe constipation from overuse of narcotic analgesics, requiring readmission.\par \par Eventually, recuperated completely and has been followed carefully clinically.\par \par \par November 2016 presented to general surgery clinic with a 7-8 year history of an enlarging mass in the posterior left thigh.\par \par Summer 2020 episode of shingles.\par \par No personal history of malignancy.\par \par + FH:\par Spring 2020, son in [place] dx'd and being treated for breast cancer.\par \par \par ~January 2018 (cannot specify duration) he noticed a nodule on the inner aspect of his right leg. 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 Nguyen) demonstrated dermatofibroma, with microscopic involvement of the margins.\par August 2018 reexcision (By me, with reconstruction by Dr. Nguyen) obtained negative margins.\par \par February 2021:\par Excision of a left wrist mass by Dr. Nguyen.\par Pathology: Mature adipose tissue and fibrous connective tissue with mild edema\par \par July 2019 he had excision of a left wrist ganglion cyst by Dr. Jensen Nguyen.\par \par \par His internist is Dr. Slava THOMPSON.\par \par He has no significant past medical history.\par \par He takes no regular medications\par \par \par Colonoscopy at age 46 okay x10 years.

## 2022-08-16 NOTE — REVIEW OF SYSTEMS
[Negative] : Heme/Lymph [de-identified] : History of benign soft tissue neoplasms of the arm [de-identified] : Schwannoma

## 2022-08-23 ENCOUNTER — APPOINTMENT (OUTPATIENT)
Dept: PLASTIC SURGERY | Facility: CLINIC | Age: 52
End: 2022-08-23

## 2022-08-23 VITALS
WEIGHT: 225 LBS | BODY MASS INDEX: 32.21 KG/M2 | DIASTOLIC BLOOD PRESSURE: 73 MMHG | HEIGHT: 70 IN | TEMPERATURE: 97.8 F | OXYGEN SATURATION: 97 % | HEART RATE: 72 BPM | SYSTOLIC BLOOD PRESSURE: 115 MMHG

## 2022-08-23 PROCEDURE — 99024 POSTOP FOLLOW-UP VISIT: CPT

## 2022-08-25 ENCOUNTER — APPOINTMENT (OUTPATIENT)
Dept: SURGICAL ONCOLOGY | Facility: CLINIC | Age: 52
End: 2022-08-25

## 2022-08-25 VITALS
RESPIRATION RATE: 16 BRPM | DIASTOLIC BLOOD PRESSURE: 79 MMHG | SYSTOLIC BLOOD PRESSURE: 127 MMHG | HEART RATE: 76 BPM | BODY MASS INDEX: 31.78 KG/M2 | HEIGHT: 70 IN | TEMPERATURE: 97.7 F | OXYGEN SATURATION: 96 % | WEIGHT: 222 LBS

## 2022-08-25 PROCEDURE — 99024 POSTOP FOLLOW-UP VISIT: CPT

## 2022-08-25 NOTE — REASON FOR VISIT
Yamilex Ayers is a 1 month old female who was brought in for her  Well Baby visit.     History was provided by caregiver    HPI:   Patient presents for:  Well Baby      Birth History:  Birth History:    Birth   Length: 19\"   Weight: 3.54 kg (7 lb 12.9 04/23/20  1526   Weight: 5.613 kg (12 lb 6 oz)   Height: 22.25\"   HC: 39.5 cm         Constitutional:  appears well hydrated, alert and responsive, no acute distress noted  Head/Face:  head is normocephalic, anterior fontanelle is normal for age  Eyes/Vis ROTAVIRUS VACCINE    discussed pelvic kidney and follow up with urology that is not urgent so can wait on that as patient doing well, growing and no issues    Immunizations discussed with parent(s).   I discussed benefits of vaccinating following the AAP [Other: _____] : [unfilled] [FreeTextEntry2] : New palpable chest mass, recent resection of  angiolipoma from the back, with plastics closure

## 2022-08-25 NOTE — REVIEW OF SYSTEMS
[Negative] : Heme/Lymph [de-identified] : \par Back mass [de-identified] : History of schwannoma

## 2022-08-25 NOTE — ASSESSMENT
[FreeTextEntry1] : Regarding the recently identified chest mass, I have suggested a targeted ultrasound and chest x-ray.\par Prescriptions entered.\par \par My clinical impression is a prominent third costochondral right-sided junction\par \par We should speak a week or 2 after the imaging to discuss the results.\par \par The conversation will guide further management

## 2022-09-01 ENCOUNTER — NON-APPOINTMENT (OUTPATIENT)
Age: 52
End: 2022-09-01

## 2022-09-02 ENCOUNTER — APPOINTMENT (OUTPATIENT)
Dept: INTERNAL MEDICINE | Facility: CLINIC | Age: 52
End: 2022-09-02

## 2022-09-13 ENCOUNTER — APPOINTMENT (OUTPATIENT)
Dept: PLASTIC SURGERY | Facility: CLINIC | Age: 52
End: 2022-09-13

## 2022-09-14 NOTE — ASU PATIENT PROFILE, ADULT - NS PRO ABUSE SCREEN SUSPICION NEGLECT YN
CAN SCHEDULE BILTERAL UPPER EYELID BLEPHAROPLASTY AND A COSMETIC DIRECT BROW LIFT. SOME SCAR POST SURGERY DISCUSSED. no

## 2022-09-17 ENCOUNTER — OUTPATIENT (OUTPATIENT)
Dept: OUTPATIENT SERVICES | Facility: HOSPITAL | Age: 52
LOS: 1 days | End: 2022-09-17
Payer: MEDICAID

## 2022-09-17 ENCOUNTER — APPOINTMENT (OUTPATIENT)
Dept: ULTRASOUND IMAGING | Facility: CLINIC | Age: 52
End: 2022-09-17

## 2022-09-17 ENCOUNTER — APPOINTMENT (OUTPATIENT)
Dept: RADIOLOGY | Facility: CLINIC | Age: 52
End: 2022-09-17

## 2022-09-17 DIAGNOSIS — R22.2 LOCALIZED SWELLING, MASS AND LUMP, TRUNK: ICD-10-CM

## 2022-09-17 DIAGNOSIS — Z98.890 OTHER SPECIFIED POSTPROCEDURAL STATES: Chronic | ICD-10-CM

## 2022-09-17 DIAGNOSIS — Z00.8 ENCOUNTER FOR OTHER GENERAL EXAMINATION: ICD-10-CM

## 2022-09-17 DIAGNOSIS — M67.432 GANGLION, LEFT WRIST: Chronic | ICD-10-CM

## 2022-09-17 PROCEDURE — 71046 X-RAY EXAM CHEST 2 VIEWS: CPT | Mod: 26

## 2022-09-17 PROCEDURE — 76604 US EXAM CHEST: CPT | Mod: 26

## 2022-09-17 PROCEDURE — 71046 X-RAY EXAM CHEST 2 VIEWS: CPT

## 2022-09-17 PROCEDURE — 76604 US EXAM CHEST: CPT

## 2022-09-20 ENCOUNTER — APPOINTMENT (OUTPATIENT)
Dept: INTERNAL MEDICINE | Facility: CLINIC | Age: 52
End: 2022-09-20

## 2022-09-20 ENCOUNTER — LABORATORY RESULT (OUTPATIENT)
Age: 52
End: 2022-09-20

## 2022-09-20 ENCOUNTER — APPOINTMENT (OUTPATIENT)
Dept: PLASTIC SURGERY | Facility: CLINIC | Age: 52
End: 2022-09-20

## 2022-09-20 ENCOUNTER — OUTPATIENT (OUTPATIENT)
Dept: OUTPATIENT SERVICES | Facility: HOSPITAL | Age: 52
LOS: 1 days | End: 2022-09-20
Payer: MEDICAID

## 2022-09-20 VITALS
OXYGEN SATURATION: 95 % | WEIGHT: 225 LBS | HEIGHT: 70 IN | SYSTOLIC BLOOD PRESSURE: 128 MMHG | HEART RATE: 72 BPM | DIASTOLIC BLOOD PRESSURE: 80 MMHG | BODY MASS INDEX: 32.21 KG/M2

## 2022-09-20 VITALS
OXYGEN SATURATION: 97 % | HEART RATE: 68 BPM | HEIGHT: 70 IN | DIASTOLIC BLOOD PRESSURE: 88 MMHG | WEIGHT: 222 LBS | SYSTOLIC BLOOD PRESSURE: 132 MMHG | TEMPERATURE: 98.4 F | BODY MASS INDEX: 31.78 KG/M2

## 2022-09-20 DIAGNOSIS — I10 ESSENTIAL (PRIMARY) HYPERTENSION: ICD-10-CM

## 2022-09-20 DIAGNOSIS — Z23 ENCOUNTER FOR IMMUNIZATION: ICD-10-CM

## 2022-09-20 DIAGNOSIS — Z98.890 OTHER SPECIFIED POSTPROCEDURAL STATES: Chronic | ICD-10-CM

## 2022-09-20 DIAGNOSIS — M67.432 GANGLION, LEFT WRIST: Chronic | ICD-10-CM

## 2022-09-20 PROCEDURE — G0008: CPT

## 2022-09-20 PROCEDURE — G0463: CPT | Mod: 25

## 2022-09-20 PROCEDURE — 99024 POSTOP FOLLOW-UP VISIT: CPT

## 2022-09-20 PROCEDURE — 85025 COMPLETE CBC W/AUTO DIFF WBC: CPT

## 2022-09-20 PROCEDURE — 83036 HEMOGLOBIN GLYCOSYLATED A1C: CPT

## 2022-09-20 PROCEDURE — 84443 ASSAY THYROID STIM HORMONE: CPT

## 2022-09-20 PROCEDURE — 80061 LIPID PANEL: CPT

## 2022-09-20 PROCEDURE — 99396 PREV VISIT EST AGE 40-64: CPT | Mod: GC

## 2022-09-20 PROCEDURE — 83721 ASSAY OF BLOOD LIPOPROTEIN: CPT

## 2022-09-20 PROCEDURE — 90688 IIV4 VACCINE SPLT 0.5 ML IM: CPT

## 2022-09-20 PROCEDURE — 80053 COMPREHEN METABOLIC PANEL: CPT

## 2022-09-20 NOTE — HISTORY OF PRESENT ILLNESS
[de-identified] : 52 yo M hx L thigh schwannoma & R leg dermatofibroma s/p resections, HLD, depression, former 10 pack year smoker, obesity, hx of back pain found to have angiolipoma and now mass on anterior chest wall following with surg-onc/plastics here for CPE and to discuss liver counts. Pre-op labs in July showed ALT 67 with normal AST, Bili and alk phos. \par \par Pt notes feeling tired lately overall. 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 Notes gaining much of the weight back that he previously lost before. \par \par Pt notes his son is [facility] and he was diagnosed with brain cancer at the beginning of the pandemic which is causing him some stress. \par \par Pt notes an episode of back spasms yesterday after lifting something heavy at work which has since resolved.

## 2022-09-20 NOTE — ASSESSMENT
[FreeTextEntry1] : 50 yo M hx L thigh schwannoma & R leg dermatofibroma s/p resections, HLD, depression, former 10 pack year smoker, obesity, hx of back pain found to have angiolipoma and now mass on anterior chest wall following with surg-onc [facility] for CPE and to discuss liver counts. Overall in good health.

## 2022-09-20 NOTE — REVIEW OF SYSTEMS
[Fatigue] : fatigue [Back Pain] : back pain [Negative] : Heme/Lymph [Fever] : no fever [Chills] : no chills [Hot Flashes] : no hot flashes [Night Sweats] : no night sweats [Recent Change In Weight] : ~T no recent weight change [Joint Pain] : no joint pain [Joint Stiffness] : no joint stiffness [Muscle Pain] : no muscle pain [Muscle Weakness] : no muscle weakness [Joint Swelling] : no joint swelling [Headache] : no headache [Memory Loss] : no memory loss

## 2022-09-20 NOTE — PLAN
[FreeTextEntry1] : Elevated ALT\par -Mildly elevated with normal AST, alk phos, bili and ALT 67. \par -Repeat today. If still elevated consider sending for RUQ US to evaluate for fatty liver given risk factors including obesity and HLD\par -Lipid profile, A1C\par -Discussed proper dietary modifications, and benefits of exercise/weight loss\par \par \par HCM\par -PHQ-2 score 0\par -CBC, CMP, lipid profile, A1C, TSH\par -Flu shot to be administered on this visit\par -UTD on colonoscopy\par -RTC PRN

## 2022-09-20 NOTE — PHYSICAL EXAM
[No Varicosities] : no varicosities [Pedal Pulses Present] : the pedal pulses are present [No Edema] : there was no peripheral edema [No Rash] : no rash [Normal] : affect was normal and insight and judgment were intact [de-identified] : + healing wound right lower back. +scarring at prior resection sites on extremities.

## 2022-09-20 NOTE — HEALTH RISK ASSESSMENT
[Former] : Former [5-9] : 5-9 [1 or 2 (0 pts)] : 1 or 2 (0 points) [Never (0 pts)] : Never (0 points) [With Significant Other] : lives with significant other [With Family] : lives with family [# of Members in Household ___] :  household currently consist of [unfilled] member(s) [Employed] : employed [0] : 2) Feeling down, depressed, or hopeless: Not at all (0) [PHQ-2 Negative - No further assessment needed] : PHQ-2 Negative - No further assessment needed [YearQuit] : 2014 [Audit-CScore] : 0 [OMB3Drecr] : 0

## 2022-09-21 DIAGNOSIS — Z00.00 ENCOUNTER FOR GENERAL ADULT MEDICAL EXAMINATION WITHOUT ABNORMAL FINDINGS: ICD-10-CM

## 2022-09-21 DIAGNOSIS — R74.01 ELEVATION OF LEVELS OF LIVER TRANSAMINASE LEVELS: ICD-10-CM

## 2022-09-21 DIAGNOSIS — F32.A DEPRESSION, UNSPECIFIED: ICD-10-CM

## 2022-09-21 LAB
ALBUMIN SERPL ELPH-MCNC: 5.1 G/DL
ALP BLD-CCNC: 72 U/L
ALT SERPL-CCNC: 44 U/L
ANION GAP SERPL CALC-SCNC: 12 MMOL/L
AST SERPL-CCNC: 39 U/L
BASOPHILS # BLD AUTO: 0.03 K/UL
BASOPHILS NFR BLD AUTO: 0.5 %
BILIRUB SERPL-MCNC: 0.3 MG/DL
BUN SERPL-MCNC: 25 MG/DL
CALCIUM SERPL-MCNC: 9.4 MG/DL
CHLORIDE SERPL-SCNC: 102 MMOL/L
CHOLEST SERPL-MCNC: 214 MG/DL
CO2 SERPL-SCNC: 25 MMOL/L
CREAT SERPL-MCNC: 0.82 MG/DL
EGFR: 107 ML/MIN/1.73M2
EOSINOPHIL # BLD AUTO: 0.12 K/UL
EOSINOPHIL NFR BLD AUTO: 2.1 %
ESTIMATED AVERAGE GLUCOSE: 111 MG/DL
GLUCOSE SERPL-MCNC: 93 MG/DL
HBA1C MFR BLD HPLC: 5.5 %
HCT VFR BLD CALC: 45.6 %
HDLC SERPL-MCNC: 42 MG/DL
HGB BLD-MCNC: 15 G/DL
IMM GRANULOCYTES NFR BLD AUTO: 0.2 %
LDLC SERPL CALC-MCNC: NORMAL MG/DL
LYMPHOCYTES # BLD AUTO: 2.53 K/UL
LYMPHOCYTES NFR BLD AUTO: 43.7 %
MAN DIFF?: NORMAL
MCHC RBC-ENTMCNC: 30.4 PG
MCHC RBC-ENTMCNC: 32.9 GM/DL
MCV RBC AUTO: 92.3 FL
MONOCYTES # BLD AUTO: 0.45 K/UL
MONOCYTES NFR BLD AUTO: 7.8 %
NEUTROPHILS # BLD AUTO: 2.65 K/UL
NEUTROPHILS NFR BLD AUTO: 45.7 %
NONHDLC SERPL-MCNC: 171 MG/DL
PLATELET # BLD AUTO: 340 K/UL
POTASSIUM SERPL-SCNC: 4.3 MMOL/L
PROT SERPL-MCNC: 7 G/DL
RBC # BLD: 4.94 M/UL
RBC # FLD: 12.5 %
SODIUM SERPL-SCNC: 139 MMOL/L
TRIGL SERPL-MCNC: 434 MG/DL
TSH SERPL-ACNC: 2.02 UIU/ML
WBC # FLD AUTO: 5.79 K/UL

## 2022-10-06 ENCOUNTER — APPOINTMENT (OUTPATIENT)
Dept: INTERNAL MEDICINE | Facility: CLINIC | Age: 52
End: 2022-10-06

## 2022-10-10 NOTE — ED ADULT NURSE NOTE - CARDIO ASSESSMENT
Hypokalemic on admission, repleted w/ IV and po K, now borderline high.  - hold potassium today  - restart at home dosing of 10meq/day tomorrow       WDL

## 2022-11-01 NOTE — BRIEF OPERATIVE NOTE - OPERATION/FINDINGS
Patient seen in person in Medication Management Service. Patient states compliant all of the time with regimen. No bleeding or thromboembolic side effects noted. Has been taking ibuprofen (#4 200 mg tablets once or twice a day) for the past several days. Eating spinach about once or twice a week. No significant recent illness or disease state changes. Has been in pain (all over), especially in legs. Advised about knowing what symptoms of DVT are. PT/INR done via POC meter per protocol. INR was therapeutic at 2.2.  (goal 2 - 3)    Warfarin regimen will be continued at current dose 5 mg Tue/Thur/Sat and 2.5 mg all other days. Will retest in 4 weeks. Patient understands dosing directions and information discussed. Dosing schedule and follow up appointment given to patient. Progress note routed to referring physicians office. Patient acknowledges working in [address] [place] [address] with Pharmacist as referred by his/her physician/provider. COVID 19 screening completed. At this time patient denies symptoms, recent travel and exposure. Patient educated to screen for temperature and COVID-19 symptoms prior to coming to clinic for next appointment. They are instructed to call the clinic to reschedule if they have any symptoms. Standing order for PT/INR has been placed in preparation to transition to possible remote INR monitoring given efforts to reduce the spread of COVID-19.       For Pharmacy Admin Tracking Only    Total # of Interventions Recommended: 0  Total # of Interventions Accepted: 0  Time Spent (min): 1239 Skyler Scruggs RPH,PharmD, BCACP  11/1/2022  1:46 PM
omentum incarcerated in umbilical hernia with 6-8 mm fascial defect

## 2022-11-09 NOTE — PHYSICAL THERAPY INITIAL EVALUATION ADULT - WEIGHT-BEARING RESTRICTIONS: GAIT, REHAB EVAL
Addended by: JUAN JON on: 11/9/2022 08:24 AM     Modules accepted: Orders    
weight-bearing as tolerated

## 2022-11-21 NOTE — ASU PREOP CHECKLIST - VERIFY SURGICAL SITE/SIDE WITH PATIENT
right arm right arm/done Rhofade Counseling: Rhofade is a topical medication which can decrease superficial blood flow where applied. Side effects are uncommon and include stinging, redness and allergic reactions.

## 2022-12-07 NOTE — ED PROVIDER NOTE - DISCHARGE DATE
Clofazimine Pregnancy And Lactation Text: This medication is Pregnancy Category C and isn't considered safe during pregnancy. It is excreted in breast milk. 11-Feb-2019

## 2022-12-13 NOTE — H&P PST ADULT - I HAVE PERSONALLY SEEN AND EXAMINED THE PATIENT. THERE HAVE NOT BEEN ANY CHANGES IN THE PATIENT'S HISTORY OR EXAM UNLESS COMMENTED BELOW
Suspect sepsis from E. Coli bacteremia with GI or urinary source?  - ECHO 11/30 EF 55 normal LV/RV   - TSH wnl  - EKG 12/7- new Twave inversions V2-V6, II, III, AVF- EKG 6 hrs later slightly improved, repeat stable  - Trop 100->102-> no longer trending (patient not a candidate for Asp/ hep given pancytopenia) - lost pulse, s/p code blue, 1 round epi, CPR, with ROSC   - intubated, on pressors, transferred to ICU for further care Statement Selected

## 2022-12-23 ENCOUNTER — EMERGENCY (EMERGENCY)
Facility: HOSPITAL | Age: 52
LOS: 1 days | Discharge: HOME | End: 2022-12-23
Attending: STUDENT IN AN ORGANIZED HEALTH CARE EDUCATION/TRAINING PROGRAM | Admitting: STUDENT IN AN ORGANIZED HEALTH CARE EDUCATION/TRAINING PROGRAM
Payer: MEDICAID

## 2022-12-23 VITALS
SYSTOLIC BLOOD PRESSURE: 144 MMHG | DIASTOLIC BLOOD PRESSURE: 83 MMHG | RESPIRATION RATE: 20 BRPM | HEART RATE: 66 BPM | HEIGHT: 70 IN | OXYGEN SATURATION: 97 % | TEMPERATURE: 98 F | WEIGHT: 229.94 LBS

## 2022-12-23 DIAGNOSIS — Z98.890 OTHER SPECIFIED POSTPROCEDURAL STATES: Chronic | ICD-10-CM

## 2022-12-23 DIAGNOSIS — M67.432 GANGLION, LEFT WRIST: Chronic | ICD-10-CM

## 2022-12-23 PROCEDURE — 99282 EMERGENCY DEPT VISIT SF MDM: CPT

## 2022-12-23 PROCEDURE — 99283 EMERGENCY DEPT VISIT LOW MDM: CPT

## 2022-12-23 RX ORDER — OXYCODONE AND ACETAMINOPHEN 5; 325 MG/1; MG/1
1 TABLET ORAL
Qty: 12 | Refills: 0
Start: 2022-12-23 | End: 2022-12-25

## 2022-12-23 NOTE — ED ADULT NURSE NOTE - OBJECTIVE STATEMENT
c/o left upper toothache x3 days. Taking Tylenol and Advil w/out relief. Dentist prescribed Amoxicillin. Started this a.m. Hx filling in same tooth 1 month ago. Tooth in place and in tact.

## 2022-12-23 NOTE — ED PROVIDER NOTE - NSFOLLOWUPINSTRUCTIONS_ED_ALL_ED_FT
Please take the medication as prescribed and follow up with your dentist.  Do not drive or operate heavy machinery while taking Percocet.  Return to the ER for persistent pain, fever, facial swelling, inability to swallow, headache, or any other concerns.       Dental Pain       Dental pain is often a sign that something is wrong with your teeth or gums. You can also have pain after a dental treatment. If you have dental pain, it is important to contact your dentist, especially if the cause of the pain is not known. Dental pain may hurt a lot or a little and can be caused by many things, including:  •Tooth decay (cavities or caries).      •Infection.      •The inner part of the tooth being filled with pus (an abscess).      •Injury.      •A crack in the tooth.      •Gums that move back and expose the root of a tooth.      •Gum disease.      •Abnormal grinding or clenching of teeth.      •Not taking good care of your teeth.      Sometimes the cause of pain is not known.    You may have pain all the time, or it may happen only when you are:  •Chewing.      •Exposed to hot or cold temperatures.      •Eating or drinking foods or drinks that have a lot of sugar in them, such as soda or candy.        Follow these instructions at home:    Medicines     •Take over-the-counter and prescription medicines only as told by your dentist.      •If you were prescribed an antibiotic medicine, take it as told by your dentist. Do not stop taking it even if you start to feel better.      Eating and drinking     Do not eat foods or drinks that cause you pain. These include:  •Very hot or very cold foods or drinks.      •Sweet or sugary foods or drinks.        Managing pain and swelling  •If told, put ice on the painful area of your face. To do this:  •Put ice in a plastic bag.       •Place a towel between your skin and the bag.       •Leave the ice on for 20 minutes, 2–3 times a day.      •Take off the ice if your skin turns bright red. This is very important. If you cannot feel pain, heat, or cold, you have a greater risk of damage to the area.        Brushing your teeth    •Brush your teeth twice a day using a fluoride toothpaste.       •Use a toothpaste made for sensitive teeth as told by your dentist.      •Use a soft toothbrush.      General instructions    •Floss your teeth at least once a day.      •Do not put heat on the outside of your face.      •Rinse your mouth often with salt water. To make salt water, dissolve ½–1 tsp (3–6 g) of salt in 1 cup (237 mL) of warm water.      •Watch your dental pain. Let your dentist know if there are any changes.      •Keep all follow-up visits.        Contact a dentist if:    •You have dental pain and you do not know why.      •Medicine does not help your pain.      •Your symptoms get worse.      •You have new symptoms.        Get help right away if:    •You cannot open your mouth.      •You are having trouble breathing or swallowing.      •You have a fever.      •Your face, neck, or jaw is swollen.      These symptoms may be an emergency. Get help right away. Call your local emergency services (911 in the [place]   • Do not wait to see if the symptoms will go away.       • Do not drive yourself to the hospital.         Summary    •Dental pain may be caused by many things, including tooth decay, injury, or infection. In some cases, the cause is not known.      •Dental pain may hurt a lot or very little. You may have pain all the time, or you may have it only when you eat or drink.      •Take over-the-counter and prescription medicines only as told by your dentist.      •Watch your dental pain for any changes. Let your dentist know if symptoms get worse.      This information is not intended to replace advice given to you by your health care provider. Make sure you discuss any questions you have with your health care provider.

## 2022-12-23 NOTE — ED PROVIDER NOTE - OBJECTIVE STATEMENT
52 year old male with a history of fatty liver, anorectal abscess presents with left upper toothache x 3 days.  Patient states he had a filling placed in that tooth 1 month ago secondary to pain.  He was advised by his dentist that he will likely need a root canal if pain recurs.  Patient had been pain-free until 3 days ago.  States he has a habit of grinding his teeth at night his left upper molar is shortened as a result.  He called his dentist today and was started on Amoxicillin which he took a single dose of.  He has been taking Motrin and Tylenol for pain with no relief.  No fever, facial swelling, trouble swallowing. PMD Clinic in [place].  Dentist Clinic in [place]

## 2022-12-23 NOTE — ED ADULT TRIAGE NOTE - CHIEF COMPLAINT QUOTE
c/o left upper toothache x3 days. Taking Tylenol and Advil w/out relief. Dentist prescribed Amoxicillin. Started this a.m.

## 2022-12-23 NOTE — ED PROVIDER NOTE - PATIENT PORTAL LINK FT
You can access the FollowMyHealth Patient Portal offered by [facility] by registering at the following website: [email or web address]. By joining [employer]’s FollowMyHealth portal, you will also be able to view your health information using other applications (apps) compatible with our system.

## 2022-12-23 NOTE — ED PROVIDER NOTE - NSFOLLOWUPCLINICS_GEN_ALL_ED_FT
[facility]  Dental  [address] [place] [address]  Phone: [phone]  Fax:

## 2022-12-23 NOTE — ED PROVIDER NOTE - CLINICAL SUMMARY MEDICAL DECISION MAKING FREE TEXT BOX
52 year old male p/w 3 days of left upper premolar pain.  On Amoxicillin, had been advised by dentist that he will likely need a root canal.  No fever, abscess, or facial swelling.  Analgesia, dental d/u

## 2022-12-23 NOTE — ED PROVIDER NOTE - PHYSICAL EXAMINATION
Left upper first premolar with no abscess, swelling, or bleeding. Tooth intact, filling in place. No left facial swelling or asymmetry.

## 2022-12-29 NOTE — ED ADULT NURSE NOTE - NSHISCREENINGQ1_ED_A_ED
[FreeTextEntry8] : 24 yo female presents with complaint of burning when urinating. She says it started 3 days ago. No increased frequency, flank pain, fever, chills.  No

## 2023-01-01 NOTE — ED ADULT TRIAGE NOTE - WEIGHT IN KG
Pt presented to ER accompanied by parents c/o vomiting and 1 episode of bloody diarrhea starting today. Unable to tolerate PO intake. Father says pt seems lethargic and has been having less wet diapers today. Denies fevers. UTD on immunizations.
97.5

## 2023-01-05 ENCOUNTER — APPOINTMENT (OUTPATIENT)
Dept: INTERNAL MEDICINE | Facility: CLINIC | Age: 53
End: 2023-01-05

## 2023-01-20 ENCOUNTER — APPOINTMENT (OUTPATIENT)
Dept: FAMILY MEDICINE | Facility: CLINIC | Age: 53
End: 2023-01-20
Payer: MEDICAID

## 2023-01-20 ENCOUNTER — NON-APPOINTMENT (OUTPATIENT)
Age: 53
End: 2023-01-20

## 2023-01-20 VITALS
RESPIRATION RATE: 16 BRPM | OXYGEN SATURATION: 97 % | HEIGHT: 70 IN | DIASTOLIC BLOOD PRESSURE: 79 MMHG | HEART RATE: 90 BPM | WEIGHT: 224 LBS | TEMPERATURE: 97.6 F | SYSTOLIC BLOOD PRESSURE: 132 MMHG | BODY MASS INDEX: 32.07 KG/M2

## 2023-01-20 DIAGNOSIS — Z00.00 ENCOUNTER FOR GENERAL ADULT MEDICAL EXAMINATION W/OUT ABNORMAL FINDINGS: ICD-10-CM

## 2023-01-20 DIAGNOSIS — Z86.018 PERSONAL HISTORY OF OTHER BENIGN NEOPLASM: ICD-10-CM

## 2023-01-20 DIAGNOSIS — R74.01 ELEVATION OF LEVELS OF LIVER TRANSAMINASE LEVELS: ICD-10-CM

## 2023-01-20 DIAGNOSIS — R07.0 PAIN IN THROAT: ICD-10-CM

## 2023-01-20 DIAGNOSIS — Z80.8 FAMILY HISTORY OF MALIGNANT NEOPLASM OF OTHER ORGANS OR SYSTEMS: ICD-10-CM

## 2023-01-20 PROCEDURE — 99205 OFFICE O/P NEW HI 60 MIN: CPT

## 2023-01-20 RX ORDER — COVID-19 ANTIGEN TEST
KIT MISCELLANEOUS
Qty: 2 | Refills: 0 | Status: DISCONTINUED | COMMUNITY
Start: 2022-08-15 | End: 2023-01-20

## 2023-01-20 RX ORDER — CLINDAMYCIN HYDROCHLORIDE 150 MG/1
150 CAPSULE ORAL
Qty: 10 | Refills: 0 | Status: DISCONTINUED | COMMUNITY
Start: 2022-04-25 | End: 2023-01-20

## 2023-01-20 RX ORDER — DICLOFENAC SODIUM 1% 10 MG/G
1 GEL TOPICAL DAILY
Qty: 100 | Refills: 0 | Status: DISCONTINUED | COMMUNITY
Start: 2021-12-27 | End: 2023-01-20

## 2023-01-20 RX ORDER — IBUPROFEN 600 MG/1
600 TABLET, FILM COATED ORAL
Qty: 28 | Refills: 0 | Status: DISCONTINUED | COMMUNITY
Start: 2022-04-18 | End: 2023-01-20

## 2023-01-20 RX ORDER — AMOXICILLIN AND CLAVULANATE POTASSIUM 875; 125 MG/1; MG/1
875-125 TABLET, COATED ORAL
Qty: 14 | Refills: 0 | Status: DISCONTINUED | COMMUNITY
Start: 2022-04-18 | End: 2023-01-20

## 2023-01-20 NOTE — PHYSICAL EXAM
[No Acute Distress] : no acute distress [Well Nourished] : well nourished [EOMI] : extraocular movements intact [Supple] : supple [Clear to Auscultation] : lungs were clear to auscultation bilaterally [Normal S1, S2] : normal S1 and S2 [No Edema] : there was no peripheral edema [Non-distended] : non-distended [No Rash] : no rash [Normal Gait] : normal gait [Normal Affect] : the affect was normal [de-identified] : hypertonicity of paraspinal muscles of thoracic and lumbar spine.

## 2023-01-20 NOTE — HISTORY OF PRESENT ILLNESS
[FreeTextEntry8] : Mr. Demetrius Bradford is a 53 yo male presents today to establish care. Pt has health concern today throat pain, right side. denies any fever, chills, n/v/c/d, no lump in throat. Pt notes pain on swallowing solids and liquids, pain consistency burning and pinching. Pain comes on immediately swallowing and disappears rest of the day. Pt does note hx of GERD, denies lately any burning chest pain. Pt does eats late at night prior to bedtime. Second concern, pain in mid back and lower back, states occurs when he is in prolonged hyperextended position, lying prone on the bed. 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 Pt advised today will get xray of thoracic and lumbar and evaluate.

## 2023-01-20 NOTE — ASSESSMENT
[FreeTextEntry1] : Greater than 55 min of face to face time, reviewed chart, labwork, addressed various health concerns, ordered necessary labs, imaging, referral for follow up. Answered all pt questions, coordinated care for patient.\par

## 2023-01-26 ENCOUNTER — APPOINTMENT (OUTPATIENT)
Dept: SURGICAL ONCOLOGY | Facility: CLINIC | Age: 53
End: 2023-01-26
Payer: MEDICAID

## 2023-01-26 VITALS
BODY MASS INDEX: 32.07 KG/M2 | HEART RATE: 66 BPM | OXYGEN SATURATION: 97 % | HEIGHT: 70 IN | DIASTOLIC BLOOD PRESSURE: 78 MMHG | RESPIRATION RATE: 16 BRPM | SYSTOLIC BLOOD PRESSURE: 122 MMHG | WEIGHT: 224 LBS

## 2023-01-26 DIAGNOSIS — R22.2 LOCALIZED SWELLING, MASS AND LUMP, TRUNK: ICD-10-CM

## 2023-01-26 PROCEDURE — 99215 OFFICE O/P EST HI 40 MIN: CPT

## 2023-02-04 ENCOUNTER — APPOINTMENT (OUTPATIENT)
Dept: CT IMAGING | Facility: CLINIC | Age: 53
End: 2023-02-04
Payer: MEDICAID

## 2023-02-04 ENCOUNTER — OUTPATIENT (OUTPATIENT)
Dept: OUTPATIENT SERVICES | Facility: HOSPITAL | Age: 53
LOS: 1 days | End: 2023-02-04
Payer: MEDICAID

## 2023-02-04 DIAGNOSIS — Z00.8 ENCOUNTER FOR OTHER GENERAL EXAMINATION: ICD-10-CM

## 2023-02-04 DIAGNOSIS — R22.2 LOCALIZED SWELLING, MASS AND LUMP, TRUNK: ICD-10-CM

## 2023-02-04 PROCEDURE — 71260 CT THORAX DX C+: CPT | Mod: 26

## 2023-02-04 PROCEDURE — 71260 CT THORAX DX C+: CPT

## 2023-02-09 ENCOUNTER — APPOINTMENT (OUTPATIENT)
Dept: OTOLARYNGOLOGY | Facility: CLINIC | Age: 53
End: 2023-02-09
Payer: MEDICAID

## 2023-02-09 VITALS
HEIGHT: 70 IN | BODY MASS INDEX: 32.07 KG/M2 | SYSTOLIC BLOOD PRESSURE: 120 MMHG | DIASTOLIC BLOOD PRESSURE: 70 MMHG | TEMPERATURE: 97.1 F | HEART RATE: 69 BPM | WEIGHT: 224 LBS | OXYGEN SATURATION: 98 %

## 2023-02-09 PROCEDURE — 31575 DIAGNOSTIC LARYNGOSCOPY: CPT

## 2023-02-09 NOTE — HISTORY OF PRESENT ILLNESS
[de-identified] : 52 year old male with three weeks of throat discomfort with burning sensation. He saw PCP who diagnosed GERD and started him on omeprazole. He never took it. The pain went away on its own. He still has some chest discomfort as if he has reflux. He reports breakfast is coffee in the morning, lunch he has around 1230 - 2 pm - he does have fast food because it is quick. \par He has fatty liver so was working out more and was able to improve.He then reverted back to his unhealthy eating and gained back. \par Dinner he usually has fast food, burgers, pizza, chinese food. He has dinner at 7 pm and then sleeps at 10 pm.

## 2023-02-09 NOTE — PROCEDURE
[FreeTextEntry1] : Flexible fiberoptic laryngoscopy [FreeTextEntry2] : Globus [FreeTextEntry3] : Procedure: Flexible fiberoptic laryngoscopy\par \par Pre-operative diagnosis: \par \par Indication: unable to tolerate mirror exam\par \par Details:\par After decongestant and lidocaine was sprayed in the bilateral nasal cavities, a flexible laryngoscope was inserted into the right nares. The nasal cavity, middle meatus, nasopharynx, and glottis were visualized. The endoscope was then inserted into the left nares and the nasal cavity was visualized. The patient tolerated procedure well.\par \par Results:\par Right nasal cavity: clear without masses or lesions, clear secretions\par Right inferior turbinate: normal\par Right middle turbinate: normal\par Right middle meatus: normal without masses, pus\par Right ETO: normal\par Left nasal cavity: clear without masses or lesions, clear secretions\par Left inferior turbinate: normal\par Left middle turbinate: normal\par Left middle meatus: normal without masses, pus\par Left ETO: normal\par Nasopharynx: normal without masses or lesions\par Base of tongue: clear\par Vallecula: Clear\par Secretions: normal\par Glottis: Vocal cords mobile and symmetric, piriform sinus clear, normal AE folds, arytenoids\par Normal appearing subglottis.\par \par Findings:\par mild postcricoid edema

## 2023-02-09 NOTE — ASSESSMENT
[FreeTextEntry1] : 52 year old male with mild LPR - his symptoms are improved since initial pain three weeks ago. We discussed dietary and lifestyle changes.

## 2023-02-13 NOTE — ASSESSMENT
[FreeTextEntry1] : Clinically doing well.\par \par Persistent visible and palpable asymmetry involving the upper medial right chest wall, on examination only.\par Chest x-ray and sonogram were unremarkable.\par \par For further characterization, and request for a CT scan of the chest was entered.\par \par I have asked him to call me a week after the imaging to discuss the results.\par \par That conversation will guide further management.\par \par \par \par 2/13/2023.\par We spoke.\par February 4, 2023, he had a CT scan of the chest at [place].\par No abnormalities noted related to the chest wall.\par No other findings of concern noted.\par Stable 3 mm noncalcified RUL pulmonary nodule, compared with December 2018.\par \par Presently, no further intervention is warranted for the asymmetry of the right chest wall.\par We will reexamine him in approximately 6 months.,  Sooner if needed

## 2023-02-13 NOTE — HISTORY OF PRESENT ILLNESS
[de-identified] : ***Originally REFERRED FROM [facility]\par \par \par 52-year-old man.\par \par 8/25/2022:\par He reported a "many year history" of an asymptomatic stable palpable finding involving the medial right chest.\par My PE:\par Prominent costosternal junction.\par \par 9/17/2022:\par Chest sonogram at [place]:\par No sonographic abnormality corresponding to the patient's area of concern.\par Accompanying chest x-ray: No radiographic evidence of soft tissue mass.\par No pulmonary disease.\par \par He presents for interval follow-up today.\par The area remains asymptomatic, but he thinks it looks and feels more prominent.\par No other specific or constitutional signs or symptoms.\par \par \par + Prior personal history\par August 2022:\par Resection of a symptomatic soft tissue mass from the mid back.\par Plastics: Dr. 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 Jensen Nguyen.\par \par .\par \par \par + Known subcutaneous nodule of the right parietal/occipital scalp.\par \par \par + history of a schwannoma of his posterior LEFT THIGH \par February 2017:\par Resection of a 6 cm schwannoma from the posterior LEFT THIGH, with neurosurgery (Dr. George HERNANDES), and reconstruction by Dr. Jensen Nguyen.\par Postoperative course was prolonged by severe constipation from overuse of narcotic analgesics, requiring readmission.\par \par \par November 2016 presented to general surgery clinic with a 7-8 year history of an enlarging mass in the posterior left thigh.\par \par Summer 2020 episode of shingles.\par \par No personal history of malignancy.\par \par + FH:\par Spring 2020, son in [place] dx'd and being treated for breast cancer.\par \par \par ~January 2018 (cannot specify duration) he noticed a nodule on the inner aspect of his right leg. 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 Nguyen) demonstrated dermatofibroma, with microscopic involvement of the margins.\par August 2018 reexcision (By me, with reconstruction by Dr. Nguyen) obtained negative margins.\par \par February 2021:\par Excision of a left wrist mass by Dr. Nguyen.\par Pathology: Mature adipose tissue and fibrous connective tissue with mild edema\par \par July 2019 he had excision of a left wrist ganglion cyst by Dr. Jensen Nguyen.\par \par \par His internist is Dr. Slava THOMPSON.\par He is followed in our medicine clinic.\par \par No pacemaker or defibrillator.\par No anticoagulants.\par \par He has no significant past medical history.\par \par He takes no regular medications\par \par \par Colonoscopy at age 46 okay x10 years.

## 2023-02-13 NOTE — REASON FOR VISIT
[Follow-Up Visit] : a follow-up visit for [Other: _____] : [unfilled] [FreeTextEntry2] : Visible and palpable prominence of the medial upper right chest, history of excision of several previous soft tissue neoplasms

## 2023-02-27 ENCOUNTER — RX RENEWAL (OUTPATIENT)
Age: 53
End: 2023-02-27

## 2023-02-27 RX ORDER — OMEPRAZOLE 40 MG/1
40 CAPSULE, DELAYED RELEASE ORAL
Qty: 30 | Refills: 0 | Status: ACTIVE | COMMUNITY
Start: 2023-01-20 | End: 1900-01-01

## 2023-06-22 ENCOUNTER — EMERGENCY (EMERGENCY)
Facility: HOSPITAL | Age: 53
LOS: 1 days | Discharge: HOME | End: 2023-06-22
Attending: EMERGENCY MEDICINE | Admitting: EMERGENCY MEDICINE
Payer: SELF-PAY

## 2023-06-22 VITALS
DIASTOLIC BLOOD PRESSURE: 98 MMHG | OXYGEN SATURATION: 97 % | RESPIRATION RATE: 16 BRPM | WEIGHT: 225.09 LBS | TEMPERATURE: 98 F | HEIGHT: 70 IN | SYSTOLIC BLOOD PRESSURE: 153 MMHG | HEART RATE: 64 BPM

## 2023-06-22 VITALS
DIASTOLIC BLOOD PRESSURE: 88 MMHG | HEART RATE: 64 BPM | SYSTOLIC BLOOD PRESSURE: 148 MMHG | OXYGEN SATURATION: 98 % | TEMPERATURE: 98 F | RESPIRATION RATE: 16 BRPM

## 2023-06-22 DIAGNOSIS — Z98.890 OTHER SPECIFIED POSTPROCEDURAL STATES: Chronic | ICD-10-CM

## 2023-06-22 DIAGNOSIS — M67.432 GANGLION, LEFT WRIST: Chronic | ICD-10-CM

## 2023-06-22 PROCEDURE — 99283 EMERGENCY DEPT VISIT LOW MDM: CPT | Mod: 25

## 2023-06-22 PROCEDURE — 73630 X-RAY EXAM OF FOOT: CPT | Mod: 26,LT

## 2023-06-22 PROCEDURE — 90471 IMMUNIZATION ADMIN: CPT

## 2023-06-22 PROCEDURE — 99284 EMERGENCY DEPT VISIT MOD MDM: CPT

## 2023-06-22 PROCEDURE — 73630 X-RAY EXAM OF FOOT: CPT

## 2023-06-22 PROCEDURE — 90715 TDAP VACCINE 7 YRS/> IM: CPT

## 2023-06-22 RX ORDER — CIPROFLOXACIN LACTATE 400MG/40ML
1 VIAL (ML) INTRAVENOUS
Qty: 14 | Refills: 0
Start: 2023-06-22 | End: 2023-06-28

## 2023-06-22 RX ORDER — LACTOBACILLUS ACIDOPHILUS 100MM CELL
2 CAPSULE ORAL
Qty: 28 | Refills: 0
Start: 2023-06-22 | End: 2023-06-28

## 2023-06-22 RX ORDER — CIPROFLOXACIN LACTATE 400MG/40ML
500 VIAL (ML) INTRAVENOUS ONCE
Refills: 0 | Status: COMPLETED | OUTPATIENT
Start: 2023-06-22 | End: 2023-06-22

## 2023-06-22 RX ORDER — TETANUS TOXOID, REDUCED DIPHTHERIA TOXOID AND ACELLULAR PERTUSSIS VACCINE, ADSORBED 5; 2.5; 8; 8; 2.5 [IU]/.5ML; [IU]/.5ML; UG/.5ML; UG/.5ML; UG/.5ML
0.5 SUSPENSION INTRAMUSCULAR ONCE
Refills: 0 | Status: COMPLETED | OUTPATIENT
Start: 2023-06-22 | End: 2023-06-22

## 2023-06-22 RX ADMIN — Medication 500 MILLIGRAM(S): at 09:34

## 2023-06-22 RX ADMIN — TETANUS TOXOID, REDUCED DIPHTHERIA TOXOID AND ACELLULAR PERTUSSIS VACCINE, ADSORBED 0.5 MILLILITER(S): 5; 2.5; 8; 8; 2.5 SUSPENSION INTRAMUSCULAR at 09:35

## 2023-06-22 NOTE — ED PROVIDER NOTE - NSFOLLOWUPINSTRUCTIONS_ED_ALL_ED_FT
1. Follow-up with your Primary Medical Doctor At the clinic. [phone] Call today for prompt follow-up.  2. Return to Emergency room for any worsening or persistent pain, weakness, fever, pain to the foot, discharge from the wound, redness or swelling, or any other concerning symptoms.  3. See attached instruction sheets for additional information, including information regarding signs and symptoms to look out for, reasons to seek immediate care and other important instructions.  4. Follow-up with podiatry, call today for prompt follow-up - [phone]  5.  Cipro twice daily for 7 days  6.  Lactobacillus twice daily for 7 days

## 2023-06-22 NOTE — ED PROVIDER NOTE - OBJECTIVE STATEMENT
52-year-old male with no significant past medical history presents with stepped on a nail yesterday while wearing a sneaker.  Patient complains of puncture wound to the distal aspect of the left foot.  No acute pain.  No foreign body sensation.  No discharge.  No aggravating relieving factors otherwise noted.  Last tetanus around 8 years ago.  No other acute complaints at this time.

## 2023-06-22 NOTE — ED ADULT TRIAGE NOTE - PATIENT'S PREFERRED PRONOUN
Called pt, she sounded confused and gave the phone to daughter, Jami.   Reviewed Covid sx w/Jami- specifically feeling sob. Jami denied pt has been sob, reports no fever, cough.     Spouse (Keith) still hospitalized for Covid. Jami will bring Tacy to appt on Friday with [facility]. Gave Jami my direct extension if they have questions, need symptom mgmt guidance, etc..    Updated [facility]   Withheld/Decline to Answer

## 2023-06-22 NOTE — ED PROVIDER NOTE - NS ED MD DISPO DISCHARGE CCDA
no prior lab work available for comparison, will get renal sono, repeat labs, will hold lasix , no signs of chf, no sob. hold losartan as well. renal consult Dr. Costa, Dr. Miles group. Patient/Caregiver provided printed discharge information.

## 2023-06-22 NOTE — ED PROVIDER NOTE - CLINICAL SUMMARY MEDICAL DECISION MAKING FREE TEXT BOX
Left foot stepped on a nail through his shoe, rule out acute foreign body, rule out infection, outpatient follow-up

## 2023-06-22 NOTE — ED PROVIDER NOTE - MUSCULOSKELETAL, MLM
Spine appears normal, range of motion is not limited, no muscle or joint tenderness, L plantar foot - distal medial foot with small puncture. Non-tender, no discharge, no erythema. No warmth. no FB palpated.

## 2023-06-22 NOTE — ED ADULT NURSE NOTE - OBJECTIVE STATEMENT
Complaining of puncture wound left foot. Pt stated he stepped on a [place] nail yesterday. No bleeding noted, no redness, denies pain. Stated last   tetanus shot was 7 yrs ago.

## 2023-06-22 NOTE — ED PROVIDER NOTE - NSFOLLOWUPCLINICS_GEN_ALL_ED_FT
[facility] General Internal Medicine  General Internal Medicine  [address] [place] [address]  Phone: [phone]  Fax:     [facility]  Podiatry  [address] [place] [address]  Phone: [phone]  Fax:

## 2023-06-22 NOTE — ED PROVIDER NOTE - PATIENT PORTAL LINK FT
You can access the FollowMyHealth Patient Portal offered by [facility] by registering at the following website: [email or web address]. By joining [employer]’s FollowMyHealth portal, you will also be able to view your health information using other applications (apps) compatible with our system.

## 2023-08-09 ENCOUNTER — EMERGENCY (EMERGENCY)
Facility: HOSPITAL | Age: 53
LOS: 1 days | Discharge: HOME | End: 2023-08-09
Attending: EMERGENCY MEDICINE | Admitting: EMERGENCY MEDICINE
Payer: MEDICAID

## 2023-08-09 VITALS
HEIGHT: 70 IN | DIASTOLIC BLOOD PRESSURE: 75 MMHG | HEART RATE: 80 BPM | SYSTOLIC BLOOD PRESSURE: 146 MMHG | OXYGEN SATURATION: 97 % | RESPIRATION RATE: 16 BRPM | TEMPERATURE: 98 F | WEIGHT: 220.02 LBS

## 2023-08-09 VITALS
RESPIRATION RATE: 16 BRPM | SYSTOLIC BLOOD PRESSURE: 132 MMHG | OXYGEN SATURATION: 99 % | DIASTOLIC BLOOD PRESSURE: 65 MMHG | HEART RATE: 78 BPM | TEMPERATURE: 98 F

## 2023-08-09 DIAGNOSIS — Z98.890 OTHER SPECIFIED POSTPROCEDURAL STATES: Chronic | ICD-10-CM

## 2023-08-09 DIAGNOSIS — M67.432 GANGLION, LEFT WRIST: Chronic | ICD-10-CM

## 2023-08-09 LAB
ALBUMIN SERPL ELPH-MCNC: 4.2 G/DL — SIGNIFICANT CHANGE UP (ref 3.3–5)
ALP SERPL-CCNC: 64 U/L — SIGNIFICANT CHANGE UP (ref 30–120)
ALT FLD-CCNC: 67 U/L DA — HIGH (ref 10–60)
ANION GAP SERPL CALC-SCNC: 7 MMOL/L — SIGNIFICANT CHANGE UP (ref 5–17)
APPEARANCE UR: CLEAR — SIGNIFICANT CHANGE UP
AST SERPL-CCNC: 80 U/L — HIGH (ref 10–40)
BASOPHILS # BLD AUTO: 0.03 K/UL — SIGNIFICANT CHANGE UP (ref 0–0.2)
BASOPHILS NFR BLD AUTO: 0.4 % — SIGNIFICANT CHANGE UP (ref 0–2)
BILIRUB SERPL-MCNC: 0.5 MG/DL — SIGNIFICANT CHANGE UP (ref 0.2–1.2)
BILIRUB UR-MCNC: NEGATIVE — SIGNIFICANT CHANGE UP
BLD GP AB SCN SERPL QL: SIGNIFICANT CHANGE UP
BUN SERPL-MCNC: 19 MG/DL — SIGNIFICANT CHANGE UP (ref 7–23)
CALCIUM SERPL-MCNC: 9.2 MG/DL — SIGNIFICANT CHANGE UP (ref 8.4–10.5)
CHLORIDE SERPL-SCNC: 104 MMOL/L — SIGNIFICANT CHANGE UP (ref 96–108)
CO2 SERPL-SCNC: 29 MMOL/L — SIGNIFICANT CHANGE UP (ref 22–31)
COLOR SPEC: YELLOW — SIGNIFICANT CHANGE UP
CREAT SERPL-MCNC: 1.62 MG/DL — HIGH (ref 0.5–1.3)
DIFF PNL FLD: NEGATIVE — SIGNIFICANT CHANGE UP
EGFR: 51 ML/MIN/1.73M2 — LOW
EOSINOPHIL # BLD AUTO: 0.13 K/UL — SIGNIFICANT CHANGE UP (ref 0–0.5)
EOSINOPHIL NFR BLD AUTO: 1.9 % — SIGNIFICANT CHANGE UP (ref 0–6)
GLUCOSE SERPL-MCNC: 101 MG/DL — HIGH (ref 70–99)
GLUCOSE UR QL: NEGATIVE MG/DL — SIGNIFICANT CHANGE UP
HCT VFR BLD CALC: 43.2 % — SIGNIFICANT CHANGE UP (ref 39–50)
HGB BLD-MCNC: 14.7 G/DL — SIGNIFICANT CHANGE UP (ref 13–17)
IMM GRANULOCYTES NFR BLD AUTO: 0.1 % — SIGNIFICANT CHANGE UP (ref 0–0.9)
KETONES UR-MCNC: NEGATIVE MG/DL — SIGNIFICANT CHANGE UP
LEUKOCYTE ESTERASE UR-ACNC: NEGATIVE — SIGNIFICANT CHANGE UP
LIDOCAIN IGE QN: 74 U/L — SIGNIFICANT CHANGE UP (ref 73–393)
LYMPHOCYTES # BLD AUTO: 2.95 K/UL — SIGNIFICANT CHANGE UP (ref 1–3.3)
LYMPHOCYTES # BLD AUTO: 43.6 % — SIGNIFICANT CHANGE UP (ref 13–44)
MCHC RBC-ENTMCNC: 30.2 PG — SIGNIFICANT CHANGE UP (ref 27–34)
MCHC RBC-ENTMCNC: 34 GM/DL — SIGNIFICANT CHANGE UP (ref 32–36)
MCV RBC AUTO: 88.9 FL — SIGNIFICANT CHANGE UP (ref 80–100)
MONOCYTES # BLD AUTO: 0.5 K/UL — SIGNIFICANT CHANGE UP (ref 0–0.9)
MONOCYTES NFR BLD AUTO: 7.4 % — SIGNIFICANT CHANGE UP (ref 2–14)
NEUTROPHILS # BLD AUTO: 3.14 K/UL — SIGNIFICANT CHANGE UP (ref 1.8–7.4)
NEUTROPHILS NFR BLD AUTO: 46.6 % — SIGNIFICANT CHANGE UP (ref 43–77)
NITRITE UR-MCNC: NEGATIVE — SIGNIFICANT CHANGE UP
NRBC # BLD: 0 /100 WBCS — SIGNIFICANT CHANGE UP (ref 0–0)
PH UR: 6 — SIGNIFICANT CHANGE UP (ref 5–8)
PLATELET # BLD AUTO: 302 K/UL — SIGNIFICANT CHANGE UP (ref 150–400)
POTASSIUM SERPL-MCNC: 3.9 MMOL/L — SIGNIFICANT CHANGE UP (ref 3.5–5.3)
POTASSIUM SERPL-SCNC: 3.9 MMOL/L — SIGNIFICANT CHANGE UP (ref 3.5–5.3)
PROT SERPL-MCNC: 7.5 G/DL — SIGNIFICANT CHANGE UP (ref 6–8.3)
PROT UR-MCNC: NEGATIVE MG/DL — SIGNIFICANT CHANGE UP
RBC # BLD: 4.86 M/UL — SIGNIFICANT CHANGE UP (ref 4.2–5.8)
RBC # FLD: 12.4 % — SIGNIFICANT CHANGE UP (ref 10.3–14.5)
SODIUM SERPL-SCNC: 140 MMOL/L — SIGNIFICANT CHANGE UP (ref 135–145)
SP GR SPEC: 1.07 — HIGH (ref 1–1.03)
UROBILINOGEN FLD QL: 0.2 MG/DL — SIGNIFICANT CHANGE UP (ref 0.2–1)
WBC # BLD: 6.76 K/UL — SIGNIFICANT CHANGE UP (ref 3.8–10.5)
WBC # FLD AUTO: 6.76 K/UL — SIGNIFICANT CHANGE UP (ref 3.8–10.5)

## 2023-08-09 PROCEDURE — 74177 CT ABD & PELVIS W/CONTRAST: CPT | Mod: 26,MA

## 2023-08-09 PROCEDURE — 99285 EMERGENCY DEPT VISIT HI MDM: CPT

## 2023-08-09 PROCEDURE — 81003 URINALYSIS AUTO W/O SCOPE: CPT

## 2023-08-09 PROCEDURE — 86900 BLOOD TYPING SEROLOGIC ABO: CPT

## 2023-08-09 PROCEDURE — 85025 COMPLETE CBC W/AUTO DIFF WBC: CPT

## 2023-08-09 PROCEDURE — 86901 BLOOD TYPING SEROLOGIC RH(D): CPT

## 2023-08-09 PROCEDURE — 86850 RBC ANTIBODY SCREEN: CPT

## 2023-08-09 PROCEDURE — 99284 EMERGENCY DEPT VISIT MOD MDM: CPT | Mod: 25

## 2023-08-09 PROCEDURE — 80053 COMPREHEN METABOLIC PANEL: CPT

## 2023-08-09 PROCEDURE — 83690 ASSAY OF LIPASE: CPT

## 2023-08-09 PROCEDURE — 36415 COLL VENOUS BLD VENIPUNCTURE: CPT

## 2023-08-09 PROCEDURE — 96374 THER/PROPH/DIAG INJ IV PUSH: CPT | Mod: XU

## 2023-08-09 PROCEDURE — 96376 TX/PRO/DX INJ SAME DRUG ADON: CPT

## 2023-08-09 PROCEDURE — 74177 CT ABD & PELVIS W/CONTRAST: CPT | Mod: MA

## 2023-08-09 RX ORDER — SODIUM CHLORIDE 9 MG/ML
1000 INJECTION INTRAMUSCULAR; INTRAVENOUS; SUBCUTANEOUS ONCE
Refills: 0 | Status: COMPLETED | OUTPATIENT
Start: 2023-08-09 | End: 2023-08-09

## 2023-08-09 RX ORDER — IOHEXOL 300 MG/ML
30 INJECTION, SOLUTION INTRAVENOUS ONCE
Refills: 0 | Status: COMPLETED | OUTPATIENT
Start: 2023-08-09 | End: 2023-08-09

## 2023-08-09 RX ORDER — MORPHINE SULFATE 50 MG/1
4 CAPSULE, EXTENDED RELEASE ORAL ONCE
Refills: 0 | Status: DISCONTINUED | OUTPATIENT
Start: 2023-08-09 | End: 2023-08-09

## 2023-08-09 RX ORDER — METHOCARBAMOL 500 MG/1
2 TABLET, FILM COATED ORAL
Qty: 16 | Refills: 0
Start: 2023-08-09

## 2023-08-09 RX ADMIN — SODIUM CHLORIDE 1000 MILLILITER(S): 9 INJECTION INTRAMUSCULAR; INTRAVENOUS; SUBCUTANEOUS at 16:57

## 2023-08-09 RX ADMIN — MORPHINE SULFATE 4 MILLIGRAM(S): 50 CAPSULE, EXTENDED RELEASE ORAL at 16:58

## 2023-08-09 RX ADMIN — IOHEXOL 30 MILLILITER(S): 300 INJECTION, SOLUTION INTRAVENOUS at 16:57

## 2023-08-09 RX ADMIN — MORPHINE SULFATE 4 MILLIGRAM(S): 50 CAPSULE, EXTENDED RELEASE ORAL at 18:52

## 2023-08-09 RX ADMIN — MORPHINE SULFATE 4 MILLIGRAM(S): 50 CAPSULE, EXTENDED RELEASE ORAL at 19:22

## 2023-08-09 RX ADMIN — MORPHINE SULFATE 4 MILLIGRAM(S): 50 CAPSULE, EXTENDED RELEASE ORAL at 17:28

## 2023-08-09 NOTE — ED PROVIDER NOTE - PHYSICAL EXAMINATION
Gen: alert, NAD  HEENT:  NC/AT, PERR, no exophthalmos  CV:  well perfused, rrr   Pulm:  normal RR, breathing comfortably, CTA b/l  Abd: soft, RLQ ttp  MSK: moving all extremities  Neuro:  non-focal  Skin:  visualized areas intact  Psych: AOx3

## 2023-08-09 NOTE — ED PROVIDER NOTE - NSFOLLOWUPINSTRUCTIONS_ED_ALL_ED_FT
Muscle Cramps and Spasms  Muscle cramps and spasms are when muscles tighten by themselves. They usually get better within minutes. Muscle cramps are painful. They are usually stronger and last longer than muscle spasms. Muscle spasms may or may not be painful. They can last a few seconds or much longer.    Cramps and spasms can affect any muscle, but they occur most often in the calf muscles of the leg. They are usually not caused by a serious problem. In many cases, the cause is not known. Some common causes include:  Doing more physical work or exercise than your body is ready for.  Using the muscles too much (overuse) by repeating certain movements too many times.  Staying in a certain position for a long time.  Playing a sport or doing an activity without preparing properly.  Using bad form or technique while playing a sport or doing an activity.  Not having enough water in your body (dehydration).  Injury.  Side effects of some medicines.  Low levels of the salts and minerals in your blood (electrolytes), such as low potassium or calcium.  Follow these instructions at home:  Managing pain and stiffness    A heating pad for use on the affected area.  Bag of ice on a towel on the skin.  Massage, stretch, and relax the muscle. Do this for many minutes at a time.  If told, put heat on tight or tense muscles as often as told by your doctor. Use the heat source that your doctor recommends, such as a moist heat pack or a heating pad.  Place a towel between your skin and the heat source.  Leave the heat on for 20–30 minutes.  Remove the heat if your skin turns bright red. This is very important if you are not able to feel pain, heat, or cold. You may have a greater risk of getting burned.  If told, put ice on the affected area. This may help if you are sore or have pain after a cramp or spasm.  Put ice in a plastic bag.  Place a towel between your skin and the bag.  Leave the ice on for 20 minutes, 2–3 times a day.  Try taking hot showers or baths to help relax tight muscles.  Eating and drinking    Drink enough fluid to keep your pee (urine) pale yellow.  Eat a healthy diet to help ensure that your muscles work well. This should include:  Fruits and vegetables.  Lean protein.  Whole grains.  Low-fat or nonfat dairy products.  General instructions    If you are having cramps often, avoid intense exercise for several days.  Take over-the-counter and prescription medicines only as told by your doctor.  Watch for any changes in your symptoms.  Keep all follow-up visits as told by your doctor. This is important.  Contact a doctor if:  Your cramps or spasms get worse or happen more often.  Your cramps or spasms do not get better with time.  Summary  Muscle cramps and spasms are when muscles tighten by themselves. They usually get better within minutes.  Cramps and spasms occur most often in the calf muscles of the leg.  Massage, stretch, and relax the muscle. This may help the cramp or spasm go away.  Drink enough fluid to keep your pee (urine) pale yellow.  This information is not intended to replace advice given to you by your health care provider. Make sure you discuss any questions you have with your health care provider.

## 2023-08-09 NOTE — ED PROVIDER NOTE - PATIENT PORTAL LINK FT
You can access the FollowMyHealth Patient Portal offered by [facility] by registering at the following website: [email or web address]. By joining [employer]’s FollowMyHealth portal, you will also be able to view your health information using other applications (apps) compatible with our system.

## 2023-08-09 NOTE — ED PROVIDER NOTE - PROGRESS NOTE DETAILS
discussed results with pt. still has ttp with palpation abd right of umbilicus, not rtUQ, no tejeda's, but not ttp when palpate from lateral aspect, based on description and labs appears more likely muscle strain/spasm, will send rx for muscle relaxant, pt advised to return for new/worsening symptoms

## 2023-08-09 NOTE — ED ADULT NURSE NOTE - DOES PATIENT HAVE ADVANCE DIRECTIVE
Pt wants Dr. Jasso's opinion on whether or not to come in for appts on 4/3/20.    Pt is nervous to come in due to COVID - 19.    Call pt in regards to this at [phone].   No

## 2023-08-09 NOTE — ED PROVIDER NOTE - OBJECTIVE STATEMENT
pt states that he has been having RLQ abdominal pain for the last few days but much worse yesterday after he lifted something heavy, pt works in construction. 4 yrs ago he had an abdominal hernia repair w mesh. pt denies n/v/d, denies change in appetite, denies any fever.

## 2023-08-09 NOTE — ED ADULT NURSE NOTE - NSFALLUNIVINTERV_ED_ALL_ED
Bed/Stretcher in lowest position, wheels locked, appropriate side rails in place/Call bell, personal items and telephone in reach/Instruct patient to call for assistance before getting out of bed/chair/stretcher/Non-slip footwear applied when patient is off stretcher/[place] to call system/Physically safe environment - no spills, clutter or unnecessary equipment/Purposeful proactive rounding/Room/bathroom lighting operational, light cord in reach

## 2023-08-09 NOTE — ED ADULT NURSE NOTE - OBJECTIVE STATEMENT
Pt is alert and oriented. Pt states that he developed right lower quadrant abdominal pain over the past 2 weeks. Pt states that the pain has been worsening. Pt denies radiation of pain. Hx of hernia repair with mesh. Pt denies sob, chest pain, nausea, vomiting, and dizziness. Pt resp are even and unlabored, skin color bonnie for race. Pt updated on plan of care.

## 2023-10-14 ENCOUNTER — EMERGENCY (EMERGENCY)
Facility: HOSPITAL | Age: 53
LOS: 1 days | Discharge: HOME | End: 2023-10-14
Attending: EMERGENCY MEDICINE | Admitting: EMERGENCY MEDICINE
Payer: MEDICAID

## 2023-10-14 VITALS
SYSTOLIC BLOOD PRESSURE: 170 MMHG | HEART RATE: 94 BPM | HEIGHT: 70 IN | OXYGEN SATURATION: 95 % | RESPIRATION RATE: 20 BRPM | DIASTOLIC BLOOD PRESSURE: 79 MMHG | TEMPERATURE: 101 F | WEIGHT: 223.99 LBS

## 2023-10-14 VITALS
RESPIRATION RATE: 19 BRPM | OXYGEN SATURATION: 95 % | TEMPERATURE: 99 F | HEART RATE: 95 BPM | DIASTOLIC BLOOD PRESSURE: 68 MMHG | SYSTOLIC BLOOD PRESSURE: 119 MMHG

## 2023-10-14 DIAGNOSIS — Z98.890 OTHER SPECIFIED POSTPROCEDURAL STATES: Chronic | ICD-10-CM

## 2023-10-14 DIAGNOSIS — M67.432 GANGLION, LEFT WRIST: Chronic | ICD-10-CM

## 2023-10-14 LAB
ALBUMIN SERPL ELPH-MCNC: 4.1 G/DL — SIGNIFICANT CHANGE UP (ref 3.3–5)
ALP SERPL-CCNC: 68 U/L — SIGNIFICANT CHANGE UP (ref 30–120)
ALT FLD-CCNC: 43 U/L — SIGNIFICANT CHANGE UP (ref 10–60)
ANION GAP SERPL CALC-SCNC: 11 MMOL/L — SIGNIFICANT CHANGE UP (ref 5–17)
AST SERPL-CCNC: 34 U/L — SIGNIFICANT CHANGE UP (ref 10–40)
BASOPHILS # BLD AUTO: 0.03 K/UL — SIGNIFICANT CHANGE UP (ref 0–0.2)
BASOPHILS NFR BLD AUTO: 0.3 % — SIGNIFICANT CHANGE UP (ref 0–2)
BILIRUB SERPL-MCNC: 0.5 MG/DL — SIGNIFICANT CHANGE UP (ref 0.2–1.2)
BUN SERPL-MCNC: 20 MG/DL — SIGNIFICANT CHANGE UP (ref 7–23)
CALCIUM SERPL-MCNC: 9.2 MG/DL — SIGNIFICANT CHANGE UP (ref 8.4–10.5)
CHLORIDE SERPL-SCNC: 104 MMOL/L — SIGNIFICANT CHANGE UP (ref 96–108)
CO2 SERPL-SCNC: 26 MMOL/L — SIGNIFICANT CHANGE UP (ref 22–31)
CREAT SERPL-MCNC: 0.91 MG/DL — SIGNIFICANT CHANGE UP (ref 0.5–1.3)
EGFR: 102 ML/MIN/1.73M2 — SIGNIFICANT CHANGE UP
EOSINOPHIL # BLD AUTO: 0.09 K/UL — SIGNIFICANT CHANGE UP (ref 0–0.5)
EOSINOPHIL NFR BLD AUTO: 1 % — SIGNIFICANT CHANGE UP (ref 0–6)
FLUAV AG NPH QL: SIGNIFICANT CHANGE UP
FLUBV AG NPH QL: SIGNIFICANT CHANGE UP
GLUCOSE SERPL-MCNC: 123 MG/DL — HIGH (ref 70–99)
HCT VFR BLD CALC: 45.8 % — SIGNIFICANT CHANGE UP (ref 39–50)
HGB BLD-MCNC: 15.2 G/DL — SIGNIFICANT CHANGE UP (ref 13–17)
IMM GRANULOCYTES NFR BLD AUTO: 0.1 % — SIGNIFICANT CHANGE UP (ref 0–0.9)
LACTATE SERPL-SCNC: 2 MMOL/L — SIGNIFICANT CHANGE UP (ref 0.7–2)
LYMPHOCYTES # BLD AUTO: 2.24 K/UL — SIGNIFICANT CHANGE UP (ref 1–3.3)
LYMPHOCYTES # BLD AUTO: 24.2 % — SIGNIFICANT CHANGE UP (ref 13–44)
MCHC RBC-ENTMCNC: 29.7 PG — SIGNIFICANT CHANGE UP (ref 27–34)
MCHC RBC-ENTMCNC: 33.2 GM/DL — SIGNIFICANT CHANGE UP (ref 32–36)
MCV RBC AUTO: 89.5 FL — SIGNIFICANT CHANGE UP (ref 80–100)
MONOCYTES # BLD AUTO: 0.56 K/UL — SIGNIFICANT CHANGE UP (ref 0–0.9)
MONOCYTES NFR BLD AUTO: 6.1 % — SIGNIFICANT CHANGE UP (ref 2–14)
NEUTROPHILS # BLD AUTO: 6.32 K/UL — SIGNIFICANT CHANGE UP (ref 1.8–7.4)
NEUTROPHILS NFR BLD AUTO: 68.3 % — SIGNIFICANT CHANGE UP (ref 43–77)
NRBC # BLD: 0 /100 WBCS — SIGNIFICANT CHANGE UP (ref 0–0)
PLATELET # BLD AUTO: 280 K/UL — SIGNIFICANT CHANGE UP (ref 150–400)
POTASSIUM SERPL-MCNC: 4.1 MMOL/L — SIGNIFICANT CHANGE UP (ref 3.5–5.3)
POTASSIUM SERPL-SCNC: 4.1 MMOL/L — SIGNIFICANT CHANGE UP (ref 3.5–5.3)
PROT SERPL-MCNC: 7.6 G/DL — SIGNIFICANT CHANGE UP (ref 6–8.3)
RBC # BLD: 5.12 M/UL — SIGNIFICANT CHANGE UP (ref 4.2–5.8)
RBC # FLD: 12.1 % — SIGNIFICANT CHANGE UP (ref 10.3–14.5)
RSV RNA NPH QL NAA+NON-PROBE: SIGNIFICANT CHANGE UP
SARS-COV-2 RNA SPEC QL NAA+PROBE: SIGNIFICANT CHANGE UP
SODIUM SERPL-SCNC: 141 MMOL/L — SIGNIFICANT CHANGE UP (ref 135–145)
WBC # BLD: 9.25 K/UL — SIGNIFICANT CHANGE UP (ref 3.8–10.5)
WBC # FLD AUTO: 9.25 K/UL — SIGNIFICANT CHANGE UP (ref 3.8–10.5)

## 2023-10-14 PROCEDURE — 99284 EMERGENCY DEPT VISIT MOD MDM: CPT | Mod: 25

## 2023-10-14 PROCEDURE — 87637 SARSCOV2&INF A&B&RSV AMP PRB: CPT

## 2023-10-14 PROCEDURE — 36415 COLL VENOUS BLD VENIPUNCTURE: CPT

## 2023-10-14 PROCEDURE — 96374 THER/PROPH/DIAG INJ IV PUSH: CPT

## 2023-10-14 PROCEDURE — 99285 EMERGENCY DEPT VISIT HI MDM: CPT

## 2023-10-14 PROCEDURE — 87040 BLOOD CULTURE FOR BACTERIA: CPT

## 2023-10-14 PROCEDURE — 96361 HYDRATE IV INFUSION ADD-ON: CPT

## 2023-10-14 PROCEDURE — 80053 COMPREHEN METABOLIC PANEL: CPT

## 2023-10-14 PROCEDURE — 85025 COMPLETE CBC W/AUTO DIFF WBC: CPT

## 2023-10-14 PROCEDURE — 94640 AIRWAY INHALATION TREATMENT: CPT

## 2023-10-14 PROCEDURE — 71045 X-RAY EXAM CHEST 1 VIEW: CPT | Mod: 26

## 2023-10-14 PROCEDURE — 83605 ASSAY OF LACTIC ACID: CPT

## 2023-10-14 PROCEDURE — 71045 X-RAY EXAM CHEST 1 VIEW: CPT

## 2023-10-14 RX ORDER — ACETAMINOPHEN 500 MG
1000 TABLET ORAL ONCE
Refills: 0 | Status: COMPLETED | OUTPATIENT
Start: 2023-10-14 | End: 2023-10-14

## 2023-10-14 RX ORDER — ALBUTEROL 90 UG/1
3 AEROSOL, METERED ORAL
Qty: 12 | Refills: 0
Start: 2023-10-14 | End: 2023-10-23

## 2023-10-14 RX ORDER — SODIUM CHLORIDE 9 MG/ML
1000 INJECTION INTRAMUSCULAR; INTRAVENOUS; SUBCUTANEOUS ONCE
Refills: 0 | Status: COMPLETED | OUTPATIENT
Start: 2023-10-14 | End: 2023-10-14

## 2023-10-14 RX ORDER — IPRATROPIUM/ALBUTEROL SULFATE 18-103MCG
3 AEROSOL WITH ADAPTER (GRAM) INHALATION ONCE
Refills: 0 | Status: COMPLETED | OUTPATIENT
Start: 2023-10-14 | End: 2023-10-14

## 2023-10-14 RX ORDER — AZITHROMYCIN 500 MG/1
1 TABLET, FILM COATED ORAL
Qty: 1 | Refills: 0
Start: 2023-10-14 | End: 2023-10-18

## 2023-10-14 RX ADMIN — SODIUM CHLORIDE 1000 MILLILITER(S): 9 INJECTION INTRAMUSCULAR; INTRAVENOUS; SUBCUTANEOUS at 05:40

## 2023-10-14 RX ADMIN — Medication 400 MILLIGRAM(S): at 04:38

## 2023-10-14 RX ADMIN — Medication 3 MILLILITER(S): at 04:54

## 2023-10-14 RX ADMIN — Medication 1000 MILLIGRAM(S): at 04:53

## 2023-10-14 RX ADMIN — Medication 1000 MILLIGRAM(S): at 05:30

## 2023-10-14 RX ADMIN — Medication 3 MILLILITER(S): at 05:04

## 2023-10-14 RX ADMIN — SODIUM CHLORIDE 1000 MILLILITER(S): 9 INJECTION INTRAMUSCULAR; INTRAVENOUS; SUBCUTANEOUS at 04:38

## 2023-10-14 NOTE — ED PROVIDER NOTE - BIRTH SEX
Male Cheek-To-Nose Interpolation Flap Text: A decision was made to reconstruct the defect utilizing an interpolation axial flap and a staged reconstruction.  A telfa template was made of the defect.  This telfa template was then used to outline the Cheek-To-Nose Interpolation flap.  The donor area for the pedicle flap was then injected with anesthesia.  The flap was excised through the skin and subcutaneous tissue down to the layer of the underlying musculature.  The interpolation flap was carefully excised within this deep plane to maintain its blood supply.  The edges of the donor site were undermined.   The donor site was closed in a primary fashion.  The pedicle was then rotated into position and sutured.  Once the tube was sutured into place, adequate blood supply was confirmed with blanching and refill.  The pedicle was then wrapped with xeroform gauze and dressed appropriately with a telfa and gauze bandage to ensure continued blood supply and protect the attached pedicle.

## 2023-10-14 NOTE — ED PROVIDER NOTE - PATIENT PORTAL LINK FT
You can access the FollowMyHealth Patient Portal offered by [facility] by registering at the following website: [email or web address]. By joining [employer]’s FollowMyHealth portal, you will also be able to view your health information using other applications (apps) compatible with our system.

## 2023-10-14 NOTE — ED PROVIDER NOTE - NSFOLLOWUPINSTRUCTIONS_ED_ALL_ED_FT
1) Follow-up with your Primary Medical Doctor or referred doctor. Call today / next business day for prompt follow-up.  2) Return to Emergency room for any worsening or persistent pain, weakness, fever, or any other concerning symptoms.  3) See attached instruction sheets for additional information, including information regarding signs and symptoms to look out for, reasons to seek immediate care and other important instructions.  4) Follow-up with any specialists as discussed / noted as well.   5) Take z-pack as prescribed  6) Albuterol nebulizer every 4-6 hours as needed for cough  7) Over the counter Delsym for cough    pper Respiratory Infection, Adult  An upper respiratory infection (URI) affects the nose, throat, and upper airways that lead to the lungs. The most common type of URI is often called the common cold. URIs usually get better on their own, without medical treatment.    What are the causes?  A URI is caused by a germ (virus). You may catch these germs by:  Breathing in droplets from an infected person's cough or sneeze.  Touching something that has the germ on it (is contaminated) and then touching your mouth, nose, or eyes.  What increases the risk?  You are more likely to get a URI if:  You are very young or very old.  You have close contact with others, such as at work, school, or a health care facility.  You smoke.  You have long-term (chronic) heart or lung disease.  You have a weakened disease-fighting system (immune system).  You have nasal allergies or asthma.  You have a lot of stress.  You have poor nutrition.  What are the signs or symptoms?  Runny or stuffy (congested) nose.  Cough.  Sneezing.  Sore throat.  Headache.  Feeling tired (fatigue).  Fever.  Not wanting to eat as much as usual.  Pain in your forehead, behind your eyes, and over your cheekbones (sinus pain).  Muscle aches.  Redness or irritation of the eyes.  Pressure in the ears or face.  How is this treated?  URIs usually get better on their own within 7–10 days. Medicines cannot cure URIs, but your doctor may recommend certain medicines to help relieve symptoms, such as:  Over-the-counter cold medicines.  Medicines to reduce coughing (cough suppressants). Coughing is a type of defense against infection that helps to clear the nose, throat, windpipe, and lungs (respiratory system). Take these medicines only as told by your doctor.  Medicines to lower your fever.  Follow these instructions at home:  Activity    Rest as needed.  If you have a fever, stay home from work or school until your fever is gone, or until your doctor says you may return to work or school.  You should stay home until you cannot spread the infection anymore (you are not contagious).  Your doctor may have you wear a face mask so you have less risk of spreading the infection.  Relieving symptoms    Rinse your mouth often with salt water. To make salt water, dissolve ½–1 tsp (3–6 g) of salt in 1 cup (237 mL) of warm water.  Use a cool-mist humidifier to add moisture to the air. This can help you breathe more easily.  Eating and drinking    Three cups showing dark yellow, yellow, and pale yellow pee.  Drink enough fluid to keep your pee (urine) pale yellow.  Eat soups and other clear broths.  General instructions    A sign showing that a person should not smoke.  Take over-the-counter and prescription medicines only as told by your doctor.  Do not smoke or use any products that contain nicotine or tobacco. If you need help quitting, ask your doctor.  Avoid being where people are smoking (avoid secondhand smoke).  Stay up to date on all your shots (immunizations), and get the flu shot every year.  Keep all follow-up visits.  How to prevent the spread of infection to others    Washing hands with soap and water.  Wash your hands with soap and water for at least 20 seconds. If you cannot use soap and water, use hand .  Avoid touching your mouth, face, eyes, or nose.  Cough or sneeze into a tissue or your sleeve or elbow. Do not cough or sneeze into your hand or into the air.  Contact a doctor if:  You are getting worse, not better.  You have any of these:  A fever or chills.  Brown or red mucus in your nose.  Yellow or brown fluid (discharge)coming from your nose.  Pain in your face, especially when you bend forward.  Swollen neck glands.  Pain when you swallow.  White areas in the back of your throat.  Get help right away if:  You have shortness of breath that gets worse.  You have very bad or constant:  Headache.  Ear pain.  Pain in your forehead, behind your eyes, and over your cheekbones (sinus pain).  Chest pain.  You have long-lasting (chronic) lung disease along with any of these:  Making high-pitched whistling sounds when you breathe, most often when you breathe out (wheezing).  Long-lasting cough (more than 14 days).  Coughing up blood.  A change in your usual mucus.  You have a stiff neck.  You have changes in your:  Vision.  Hearing.  Thinking.  Mood.  These symptoms may be an emergency. Get help right away. Call 911.  Do not wait to see if the symptoms will go away.  Do not drive yourself to the hospital.  Summary  An upper respiratory infection (URI) is caused by a germ (virus). The most common type of URI is often called the common cold.  URIs usually get better within 7–10 days.  Take over-the-counter and prescription medicines only as told by your doctor.  This information is not intended to replace advice given to you by your health care provider. Make sure you discuss any questions you have with your health care provider.

## 2023-10-14 NOTE — ED PROVIDER NOTE - OBJECTIVE STATEMENT
52-year-old male complaining of cough for 3 days fever today with associated chills chest tightness and shortness of breath.  Took Sudafed tonight without much relief.  States his son was sick several days ago.  Admits to having productive cough.

## 2023-10-14 NOTE — ED PROVIDER NOTE - NSICDXPASTMEDICALHX_GEN_ALL_CORE_FT
PAST MEDICAL HISTORY:  Anorectal abscess     Fatty liver     Ganglion cyst of wrist     History of 2019 novel coronavirus disease (COVID-19) 5/2022 - home test only-mild case    History of Clostridium difficile colitis 2012    Mass of arm, right x2 - right upper arm    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      the number listed below.  The [facility] staff will call you in the next week to see how you are doing.  If you need to speak to your Doctor, call his/her office number as listed below.  You can also feel free to contact the surgery center between the hours of 6:00 AM and 4:30 PM Monday-Thursday at [phone] with your questions/concerns regarding your instructions.    Physician:  Hector Cordoba M.D.  Office Address:  [address] [place] [address]  Office Phone: [phone]                                                     Patient and significant other have verbalized  understanding of these instructions and have  received a copy.     The following eye drops(s)__Vigamox, Diclofenac and Prednisolone_was received by __________________________________ (responsible parties signature) prior to patient's discharge.     It has been our privilege to take care of you today.  Our goal has been to make sure you and your family always felt confident in you care while you were here.  If at any time after you leave that questions, concerns, or worries should arise do not hesitate to contact us.  It is always important that we treat you with the courtesy and respect that you deserve.  Thank you for choosing [facility] for your care!

## 2023-10-14 NOTE — ED PROVIDER NOTE - CLINICAL SUMMARY MEDICAL DECISION MAKING FREE TEXT BOX
52-year-old male complaining of cough for 3 days fever today with associated chills chest tightness and shortness of breath.  Took Sudafed tonight without much relief.  States his son was sick several days ago.  Admits to having productive cough.    r/o pna sepsis, labs, XR, IV fluids, antipyretics, duoneb, cough suppressant

## 2023-10-18 NOTE — ED PROVIDER NOTE - NS ED MD DISPO SPECIAL CONSIDERATION1
Chavo Barber [address] [place] [address] 6W MED SURG  [address]  [place] [address]  Dept: [phone]  Loc: [phone]    Inpatient Medical Oncology/Hematology Progress Note    Patient Name: Shahla Farmer  YOB: 1952    DATE OF ADMISSION: 10/13/2023  DATE OF CONSULTATION: 10/14/23  CONSULTING PROVIDER: Ari Browne MD  REASON FOR CONSULTATION: \"Acute on chronic macrocytic anemia, patient known to you\"  PCP: Dustin Yusuf    Room: [address] [place] [address]      CHIEF COMPLAINT:  Dizziness, shortness of breath on exertion, fatigue    Subjective:  No major events. The patient denies any bleeding. Feeling better overall. HPI from Initial Inpatient Consultation (10/14/23): This pt is a 79 yo female who follows with Dr. Shahram John for anemia. She has a pmhx of A-fib on Eliquis, COPD, O2 dependent, hypertension, hyperlipidemia, vitamin B12 deficiency, and hyperparathyroidism. She has had an EGD done on 3/27/2023, revealing esophagitis and gastritis, she had a colonoscopy done on 5/15/2023, was normal. She was also following for ~30 pound weight loss, had CT C/A/P all negative for neoplasm. She had a + FIT test x 2, and was to undergo VCE with GI. She had evidence of JULIO CESAR and was placed on PO iron. No B12/folate, Cu, zinc deficiencies. MM work up negative. Negative dena and no evidence of hemolysis. She was last seen 8/7/23 and recommended to continue PO iron and RTC in 3 months. Apparently underwent VCE ~1 week prior to admission     She presented to the ER with dizziness, PACHECO and fatigue for several days. No B symptoms. CBC today with normal WBC and platelets. Hgb 7.6, . Iron profile from her 8/23 visit with Dr. Shahram John showed correction and Hgb at that time was 11.1.  She has been admitted with suspected GI bleeding         HEMATOLOGIC HISTORY:    Mrs. Harpreet Malik is a very pleasant 80-year-old lady, with a past medical history significant for A-fib, on chronic anticoagulation with Eliquis associated with blood loss  Erosive gastritis and stomach ulcers  Iron deficiency anemia  Hyperthyroidism on methimazole  Atrial fibrillation on Eliquis    The patient is a 80 y.o. female comes in for dyspnea and worsening anemia, concerning for GI bleed. We were consulted due to patient being known to us for her anemia. PLAN  S/p EGD on 10/16/2023, results reviewed, she was found to have mild erosive gastritis with ulcerations, no signs of bleeding. Appreciate GI recs  Continue PPI  Patient feeling better, no longer losing weight  Labs reviewed, hemoglobin 7.5, hematocrit 25.2 today. Monitor for bleed.  Melena is clearing up  May transfuse to keep Hgb >7; she has not needed blood transfusion this admission  The patient received Venofer, tolerated it well  Recommended oral iron every other day due to constipation, if unable to tolerate, would recommend outpatient parenteral iron infusions  GI is ok with restarting her Eliquis  Okay for DC from heme-onc POV with outpatient follow-up with labs on 10/27/23      Shaila Holder MD  [address] [place] [address]  10/18/2023    Efren Ferrer [place]) Office  [place]: [phone]  F: [address] Office  P: [phone]  F: [phone] Office  P: [phone] None

## 2023-10-19 ENCOUNTER — APPOINTMENT (OUTPATIENT)
Dept: SURGICAL ONCOLOGY | Facility: CLINIC | Age: 53
End: 2023-10-19
Payer: MEDICAID

## 2023-10-19 VITALS
SYSTOLIC BLOOD PRESSURE: 129 MMHG | BODY MASS INDEX: 31.5 KG/M2 | HEART RATE: 71 BPM | HEIGHT: 70 IN | WEIGHT: 220 LBS | DIASTOLIC BLOOD PRESSURE: 86 MMHG | RESPIRATION RATE: 16 BRPM | OXYGEN SATURATION: 94 %

## 2023-10-19 DIAGNOSIS — R22.30 LOCALIZED SWELLING, MASS AND LUMP, UNSPECIFIED UPPER LIMB: ICD-10-CM

## 2023-10-19 LAB
CULTURE RESULTS: SIGNIFICANT CHANGE UP
SPECIMEN SOURCE: SIGNIFICANT CHANGE UP

## 2023-10-19 PROCEDURE — 99213 OFFICE O/P EST LOW 20 MIN: CPT

## 2023-10-30 ENCOUNTER — APPOINTMENT (OUTPATIENT)
Dept: FAMILY MEDICINE | Facility: CLINIC | Age: 53
End: 2023-10-30

## 2023-11-02 ENCOUNTER — APPOINTMENT (OUTPATIENT)
Dept: FAMILY MEDICINE | Facility: CLINIC | Age: 53
End: 2023-11-02
Payer: MEDICAID

## 2023-11-02 VITALS
TEMPERATURE: 97.9 F | SYSTOLIC BLOOD PRESSURE: 128 MMHG | BODY MASS INDEX: 32.64 KG/M2 | OXYGEN SATURATION: 97 % | WEIGHT: 228 LBS | RESPIRATION RATE: 16 BRPM | HEART RATE: 75 BPM | DIASTOLIC BLOOD PRESSURE: 78 MMHG | HEIGHT: 70 IN

## 2023-11-02 DIAGNOSIS — R09.81 NASAL CONGESTION: ICD-10-CM

## 2023-11-02 DIAGNOSIS — R05.3 CHRONIC COUGH: ICD-10-CM

## 2023-11-02 PROCEDURE — 99214 OFFICE O/P EST MOD 30 MIN: CPT

## 2023-11-02 RX ORDER — MONTELUKAST 10 MG/1
10 TABLET, FILM COATED ORAL
Qty: 14 | Refills: 0 | Status: ACTIVE | COMMUNITY
Start: 2023-11-02 | End: 1900-01-01

## 2023-11-02 RX ORDER — FLUTICASONE PROPIONATE 50 UG/1
50 SPRAY, METERED NASAL DAILY
Qty: 1 | Refills: 0 | Status: ACTIVE | COMMUNITY
Start: 2023-11-02 | End: 1900-01-01

## 2023-11-02 RX ORDER — DOXYCYCLINE HYCLATE 100 MG/1
100 TABLET ORAL
Qty: 14 | Refills: 0 | Status: ACTIVE | COMMUNITY
Start: 2023-11-02 | End: 1900-01-01

## 2023-11-09 ENCOUNTER — OUTPATIENT (OUTPATIENT)
Dept: OUTPATIENT SERVICES | Facility: HOSPITAL | Age: 53
LOS: 1 days | End: 2023-11-09
Payer: MEDICAID

## 2023-11-09 ENCOUNTER — APPOINTMENT (OUTPATIENT)
Dept: RADIOLOGY | Facility: CLINIC | Age: 53
End: 2023-11-09
Payer: MEDICAID

## 2023-11-09 DIAGNOSIS — Z98.890 OTHER SPECIFIED POSTPROCEDURAL STATES: Chronic | ICD-10-CM

## 2023-11-09 DIAGNOSIS — R05.3 CHRONIC COUGH: ICD-10-CM

## 2023-11-09 DIAGNOSIS — M67.432 GANGLION, LEFT WRIST: Chronic | ICD-10-CM

## 2023-11-09 DIAGNOSIS — Z00.8 ENCOUNTER FOR OTHER GENERAL EXAMINATION: ICD-10-CM

## 2023-11-09 PROCEDURE — 71046 X-RAY EXAM CHEST 2 VIEWS: CPT

## 2023-11-09 PROCEDURE — 71046 X-RAY EXAM CHEST 2 VIEWS: CPT | Mod: 26

## 2023-11-22 ENCOUNTER — APPOINTMENT (OUTPATIENT)
Dept: FAMILY MEDICINE | Facility: CLINIC | Age: 53
End: 2023-11-22
Payer: MEDICAID

## 2023-11-22 ENCOUNTER — RESULT REVIEW (OUTPATIENT)
Age: 53
End: 2023-11-22

## 2023-11-22 VITALS
RESPIRATION RATE: 16 BRPM | BODY MASS INDEX: 31.78 KG/M2 | HEART RATE: 82 BPM | SYSTOLIC BLOOD PRESSURE: 130 MMHG | HEIGHT: 70 IN | WEIGHT: 222 LBS | TEMPERATURE: 98 F | OXYGEN SATURATION: 98 % | DIASTOLIC BLOOD PRESSURE: 89 MMHG

## 2023-11-22 DIAGNOSIS — R22.2 LOCALIZED SWELLING, MASS AND LUMP, TRUNK: ICD-10-CM

## 2023-11-22 PROCEDURE — 99213 OFFICE O/P EST LOW 20 MIN: CPT

## 2023-11-22 RX ORDER — MELOXICAM 15 MG/1
15 TABLET ORAL
Qty: 14 | Refills: 1 | Status: ACTIVE | COMMUNITY
Start: 2023-11-22 | End: 1900-01-01

## 2023-12-01 ENCOUNTER — APPOINTMENT (OUTPATIENT)
Dept: ULTRASOUND IMAGING | Facility: CLINIC | Age: 53
End: 2023-12-01
Payer: MEDICAID

## 2023-12-01 ENCOUNTER — OUTPATIENT (OUTPATIENT)
Dept: OUTPATIENT SERVICES | Facility: HOSPITAL | Age: 53
LOS: 1 days | End: 2023-12-01
Payer: MEDICAID

## 2023-12-01 DIAGNOSIS — Z98.890 OTHER SPECIFIED POSTPROCEDURAL STATES: Chronic | ICD-10-CM

## 2023-12-01 DIAGNOSIS — Z00.8 ENCOUNTER FOR OTHER GENERAL EXAMINATION: ICD-10-CM

## 2023-12-01 DIAGNOSIS — M67.432 GANGLION, LEFT WRIST: Chronic | ICD-10-CM

## 2023-12-01 DIAGNOSIS — R22.2 LOCALIZED SWELLING, MASS AND LUMP, TRUNK: ICD-10-CM

## 2023-12-01 PROCEDURE — 76604 US EXAM CHEST: CPT | Mod: 26

## 2023-12-01 PROCEDURE — 76604 US EXAM CHEST: CPT

## 2023-12-05 NOTE — ED PROVIDER NOTE - IV ALTEPLASE INCLUSION HIDDEN

## 2023-12-15 NOTE — ASU DISCHARGE PLAN (ADULT/PEDIATRIC) - PATIENT BELONGINGS
55y Male who had a R/LHC which showed normal coronary arteries, see official report for RHC findings, via RRA. Patient awake and alert without complaints. Denies chest pain, sob, palps.      Neuro: A&OX3  Lungs: CTA B/L  CV: S1, S2, no murmur, RRR  Abd: Soft  Right Wrist no bleeding, no hematoma, no ecchymosis  Extremity: + distal pulses, cap refill < 3 sec      A/P: 55y Male s/p R/LHC no intervention  1. Groin management discussed with patient  2. Continue current meds  3. Bedrest x 2 hours  4. Remove radial band in 2 hours   55y Male who had a R/LHC which showed normal coronary arteries, see official report for RHC findings, via RRA and brachial. Patient awake and alert without complaints. Denies chest pain, sob, palps.      Neuro: A&OX3  Lungs: CTA B/L  CV: S1, S2, no murmur, RRR  Abd: Soft  Right Wrist no bleeding, no hematoma, no ecchymosis  Extremity: + distal pulses, cap refill < 3 sec      A/P: 55y Male s/p R/LHC no intervention  1. Groin management discussed with patient  2. Continue current meds  3. Bedrest x 2 hours  4. Remove radial band and brachial sheath in 2 hours   Patient's belongings returned

## 2024-01-01 NOTE — REASON FOR VISIT
[Follow-Up Visit] : a follow-up visit for [Other: _____] : [unfilled] [FreeTextEntry2] : Posterior left thigh schwannoma, and benign soft tissue masses of the posterior right upper arm, (excised) Layla Oliver  (RN)  2024 04:40:05

## 2024-01-10 ENCOUNTER — APPOINTMENT (OUTPATIENT)
Dept: FAMILY MEDICINE | Facility: CLINIC | Age: 54
End: 2024-01-10

## 2024-02-09 ENCOUNTER — APPOINTMENT (OUTPATIENT)
Dept: FAMILY MEDICINE | Facility: CLINIC | Age: 54
End: 2024-02-09
Payer: MEDICAID

## 2024-02-09 VITALS — RESPIRATION RATE: 20 BRPM | SYSTOLIC BLOOD PRESSURE: 125 MMHG | HEART RATE: 78 BPM | DIASTOLIC BLOOD PRESSURE: 70 MMHG

## 2024-02-09 DIAGNOSIS — M77.9 ENTHESOPATHY, UNSPECIFIED: ICD-10-CM

## 2024-02-09 DIAGNOSIS — L03.119 CELLULITIS OF UNSPECIFIED PART OF LIMB: ICD-10-CM

## 2024-02-09 PROCEDURE — 99214 OFFICE O/P EST MOD 30 MIN: CPT | Mod: 25

## 2024-02-09 PROCEDURE — 96372 THER/PROPH/DIAG INJ SC/IM: CPT

## 2024-02-09 RX ORDER — METHYLPREDNISOLONE 4 MG/1
4 TABLET ORAL
Qty: 1 | Refills: 0 | Status: ACTIVE | COMMUNITY
Start: 2024-02-09 | End: 1900-01-01

## 2024-02-09 RX ORDER — CEFADROXIL 500 MG/1
500 CAPSULE ORAL
Qty: 20 | Refills: 0 | Status: ACTIVE | COMMUNITY
Start: 2024-02-09 | End: 1900-01-01

## 2024-02-09 RX ORDER — METHYLPRED ACET/NACL,ISO-OS/PF 40 MG/ML
40 VIAL (ML) INJECTION
Qty: 1 | Refills: 0 | Status: COMPLETED | OUTPATIENT
Start: 2024-02-09

## 2024-02-09 RX ADMIN — METHYLPREDNISOLONE ACETATE 0 MG/ML: 40 INJECTION, SUSPENSION INTRA-ARTICULAR; INTRALESIONAL; INTRAMUSCULAR; SOFT TISSUE at 00:00

## 2024-02-09 NOTE — REVIEW OF SYSTEMS
[Fever] : no fever [Chills] : no chills [Joint Pain] : joint pain [Negative] : Genitourinary [FreeTextEntry9] : as in HPI [de-identified] : as in HPI

## 2024-02-09 NOTE — HISTORY OF PRESENT ILLNESS
[FreeTextEntry8] : Presents on acute basis - originally due to persistent pain R elbow, but now also C/O painful "pimple" back of R leg - states had several "pimples" - scratched them - all disappears except for one which is now painful.

## 2024-02-09 NOTE — PHYSICAL EXAM
[Uncomfortable] : uncomfortable [Normal] : normal rate, regular rhythm, normal S1 and S2 and no murmur heard [No Edema] : there was no peripheral edema [Soft] : abdomen soft [Non Tender] : non-tender [No Focal Deficits] : no focal deficits [Alert and Oriented x3] : oriented to person, place, and time [de-identified] : exquisite tenderness over lateral aspect R elbow [de-identified] : approximately 2cm diameter indurated area posterior aspect R upper leg - erythematous, warm, very tender; non-fluctuant

## 2024-02-09 NOTE — ASSESSMENT
[FreeTextEntry1] : 1 - Persistent tendonitis R elbow - DepoMedrol 40mg IM given R deltoid; Medrol DosePak; warm compresses; imaging ordered; consider Ortho if no resolution 2 - Cellulitis R upper leg - Duricef 500mg twice daily; warm compresses; advise F/U early next week; also advise if worsens must present to the ER

## 2024-02-13 ENCOUNTER — APPOINTMENT (OUTPATIENT)
Dept: FAMILY MEDICINE | Facility: CLINIC | Age: 54
End: 2024-02-13

## 2024-02-14 ENCOUNTER — APPOINTMENT (OUTPATIENT)
Dept: RADIOLOGY | Facility: CLINIC | Age: 54
End: 2024-02-14
Payer: MEDICAID

## 2024-02-14 ENCOUNTER — OUTPATIENT (OUTPATIENT)
Dept: OUTPATIENT SERVICES | Facility: HOSPITAL | Age: 54
LOS: 1 days | End: 2024-02-14
Payer: MEDICAID

## 2024-02-14 DIAGNOSIS — M67.432 GANGLION, LEFT WRIST: Chronic | ICD-10-CM

## 2024-02-14 DIAGNOSIS — M77.9 ENTHESOPATHY, UNSPECIFIED: ICD-10-CM

## 2024-02-14 DIAGNOSIS — Z98.890 OTHER SPECIFIED POSTPROCEDURAL STATES: Chronic | ICD-10-CM

## 2024-02-14 PROCEDURE — 73070 X-RAY EXAM OF ELBOW: CPT

## 2024-02-14 PROCEDURE — 73070 X-RAY EXAM OF ELBOW: CPT | Mod: 26,RT

## 2024-02-15 ENCOUNTER — EMERGENCY (EMERGENCY)
Facility: HOSPITAL | Age: 54
LOS: 1 days | Discharge: HOME | End: 2024-02-15
Attending: EMERGENCY MEDICINE | Admitting: EMERGENCY MEDICINE
Payer: MEDICAID

## 2024-02-15 ENCOUNTER — APPOINTMENT (OUTPATIENT)
Dept: FAMILY MEDICINE | Facility: CLINIC | Age: 54
End: 2024-02-15
Payer: MEDICAID

## 2024-02-15 VITALS
OXYGEN SATURATION: 98 % | HEIGHT: 62 IN | TEMPERATURE: 98 F | RESPIRATION RATE: 14 BRPM | SYSTOLIC BLOOD PRESSURE: 130 MMHG | DIASTOLIC BLOOD PRESSURE: 89 MMHG | HEART RATE: 89 BPM | WEIGHT: 119.93 LBS

## 2024-02-15 VITALS
RESPIRATION RATE: 17 BRPM | SYSTOLIC BLOOD PRESSURE: 125 MMHG | WEIGHT: 227 LBS | DIASTOLIC BLOOD PRESSURE: 75 MMHG | TEMPERATURE: 97.4 F | OXYGEN SATURATION: 96 % | BODY MASS INDEX: 32.5 KG/M2 | HEART RATE: 85 BPM | HEIGHT: 70 IN

## 2024-02-15 VITALS
HEART RATE: 75 BPM | RESPIRATION RATE: 15 BRPM | TEMPERATURE: 98 F | OXYGEN SATURATION: 99 % | SYSTOLIC BLOOD PRESSURE: 142 MMHG | DIASTOLIC BLOOD PRESSURE: 93 MMHG

## 2024-02-15 DIAGNOSIS — Z98.890 OTHER SPECIFIED POSTPROCEDURAL STATES: Chronic | ICD-10-CM

## 2024-02-15 DIAGNOSIS — L02.419 CUTANEOUS ABSCESS OF LIMB, UNSPECIFIED: ICD-10-CM

## 2024-02-15 DIAGNOSIS — L02.415 CUTANEOUS ABSCESS OF RIGHT LOWER LIMB: ICD-10-CM

## 2024-02-15 DIAGNOSIS — M25.521 PAIN IN RIGHT ELBOW: ICD-10-CM

## 2024-02-15 DIAGNOSIS — M77.8 OTHER ENTHESOPATHIES, NOT ELSEWHERE CLASSIFIED: ICD-10-CM

## 2024-02-15 DIAGNOSIS — M67.432 GANGLION, LEFT WRIST: Chronic | ICD-10-CM

## 2024-02-15 PROCEDURE — 99283 EMERGENCY DEPT VISIT LOW MDM: CPT

## 2024-02-15 PROCEDURE — 99214 OFFICE O/P EST MOD 30 MIN: CPT

## 2024-02-15 RX ORDER — MUPIROCIN 20 MG/G
1 OINTMENT TOPICAL
Qty: 1 | Refills: 0
Start: 2024-02-15 | End: 2024-02-21

## 2024-02-15 NOTE — ED PROVIDER NOTE - NSFOLLOWUPINSTRUCTIONS_ED_ALL_ED_FT
Follow up with your primary care physician within 2-3 days.     Continue taking the antibiotics you are previously prescribed until its finished.  Start applying the Bactroban antibiotic ointment to the wound with gauze twice a day as discussed.   You may also apply warm compresses presses around the wound to help with pain and discomfort    Stay hydrated    Return to the ER if your symptoms worsen or for any other medical emergencies  ************    Abscess    An abscess is an infected area that contains a collection of pus and debris. It can occur in almost any part of the body and occurs when the tissue gets infection. Symptoms include a painful mass that is red, warm, tender that might break open and HAVE drainage. If your health care provider gave you antibiotics make sure to take the full course and do not stop even if feeling better.     SEEK IMMEDIATE MEDICAL CARE IF YOU HAVE ANY OF THE FOLLOWING SYMPTOMS: chills, fever, muscle aches, or red streaking from the area.

## 2024-02-15 NOTE — ED ADULT NURSE NOTE - OBJECTIVE STATEMENT
pt reports couple pimples showed up on right thigh last week, itched them, and then they became hot, swollen. went to doctor 2 days ago has been on cefadroxil BID since Saturday, went to PMD today because the bandaid fell off and there was leaking of pus and more pain, sent to ER for further eval. denies fever/chills. pt reports couple pimples showed up on right thigh last week, itched them, and then one of them became hot, swollen. went to doctor and has been on cefadroxil BID since Saturday, went to PMD today because the bandaid fell off and pt noted leaking of pus and more pain, sent to ER for further eval. denies fever/chills.

## 2024-02-15 NOTE — ED PROVIDER NOTE - PATIENT PORTAL LINK FT
You can access the FollowMyHealth Patient Portal offered by [facility] by registering at the following website: [email or web address]. By joining [employer]’s FollowMyHealth portal, you will also be able to view your health information using other applications (apps) compatible with our system.

## 2024-02-15 NOTE — ED PROVIDER NOTE - OBJECTIVE STATEMENT
53 year-old male no reported past medical history presents to the ED with a right posterior thigh abscess x 1.5-week.  He went to his PCP 6 days ago and was placed on cefadroxil antibiotic which he has been taking for the last 5 days.  Reports it started to drain yesterday.  He went for follow-up with the PCP today and his PCP referred him to the ER.  Patient denies any fever chills, paresthesias of his leg, numbness tingling or all other complaints

## 2024-02-15 NOTE — ED PROVIDER NOTE - PHYSICAL EXAMINATION
Gen: Well appearing in NAD.   Head: atraumatic  Neuro: AAO x3,  Skin: +Small right posterior thigh abscess, already draining pus.  No induration noted or fluctuance.  No surrounding redness or signs of cellulitis noted. Upon expression of the wound, no more pus is coming out   Psych: Alert and oriented

## 2024-02-15 NOTE — ED PROVIDER NOTE - CLINICAL SUMMARY MEDICAL DECISION MAKING FREE TEXT BOX
53 year-old male no reported past medical history presents to the ED with a right posterior thigh abscess x 1.5-week.  He went to his PCP 6 days ago and was placed on cefadroxil antibiotic which he has been taking for the last 5 days.  Reports it started to drain yesterday.  He went for follow-up with the PCP today and his PCP referred him to the ER.  Patient denies any fever chills, paresthesias of his leg, numbness tingling or all other complaints  Physical exam vital signs stable afebrile no distress.  There is a 2 to 3 cm diameter ulcerated lesion on the right posterior thigh with minimal purulent drainage.  There is no fluctuance or abscess collection.  There is no surrounding cellulitis or erythema.  Impression is right leg abscess already spontaneously drained.  No sign of spreading cellulitis or infection.  No drainable collection.  Plan is continue antibiotics and topical dressing changes as discussed.  Follow-up with PCP as planned.

## 2024-02-19 NOTE — REVIEW OF SYSTEMS
[Joint Pain] : joint pain [Negative] : Genitourinary [Fever] : no fever [Chills] : no chills [FreeTextEntry9] : as in HPI [de-identified] : as in HPI

## 2024-02-19 NOTE — PHYSICAL EXAM
[Uncomfortable] : uncomfortable [Normal] : normal rate, regular rhythm, normal S1 and S2 and no murmur heard [No Edema] : there was no peripheral edema [Soft] : abdomen soft [Non Tender] : non-tender [No Focal Deficits] : no focal deficits [Alert and Oriented x3] : oriented to person, place, and time [de-identified] : exquisite tenderness over lateral aspect R elbow [de-identified] : approximately 2 inhes  diameter indurated area posterior aspect R upper leg - erythematous, warm, very tender; non-fluctuant

## 2024-02-19 NOTE — HISTORY OF PRESENT ILLNESS
[FreeTextEntry8] : Presents on acute basis - persistent pain R elbow for 4 weeks after lifting heavy weight at work. He finished steroids today; He also has right thigh abscess not responding to oral antibiotics..

## 2024-02-19 NOTE — HEALTH RISK ASSESSMENT
[No] : In the past 12 months have you used drugs other than those required for medical reasons? No [No falls in past year] : Patient reported no falls in the past year [0] : 2) Feeling down, depressed, or hopeless: Not at all (0) [PHQ-2 Negative - No further assessment needed] : PHQ-2 Negative - No further assessment needed [Never] : Never [de-identified] : regular [QLV1Evude] : 0

## 2024-02-19 NOTE — ASSESSMENT
[FreeTextEntry1] : 1 - Persistent tendonitis R elbow -  Medrol DosePak completed today, warm compresses; consider Ortho if no resolution 2 - Cellulitis R upper leg - Duricef 500mg twice daily-continue ; warm compresses; sent to ER.

## 2024-02-19 NOTE — PLAN
[FreeTextEntry1] : Right thigh wound: warm compresses. continue antibiotics. referred to surgery. called dr. Allen office - no apt. available soon. referred to ER since abscess is getting worse.  pt. decided he will go to ER for I and D of abscess.

## 2024-02-22 NOTE — BRIEF OPERATIVE NOTE - SPECIMENS
hga1c at goal, less than 7.0  Normal thyroid  Normal B12  Magnesium is lower, but better with twice a day dosing of the magnesium, patient to continue this and continue follow up with nephrology  LDL less than 100, cholesterol at goal.
hernia sac and incarcerated omentum

## 2024-02-23 NOTE — ED ADULT TRIAGE NOTE - STATUS:
Patient states that she cut her finger on the stove this morning and all though the bleeding has stopped she states it continues to hurt \"real bad.\"    Applied

## 2024-02-26 ENCOUNTER — APPOINTMENT (OUTPATIENT)
Dept: MRI IMAGING | Facility: CLINIC | Age: 54
End: 2024-02-26
Payer: MEDICAID

## 2024-02-26 ENCOUNTER — OUTPATIENT (OUTPATIENT)
Dept: OUTPATIENT SERVICES | Facility: HOSPITAL | Age: 54
LOS: 1 days | End: 2024-02-26
Payer: MEDICAID

## 2024-02-26 ENCOUNTER — RESULT REVIEW (OUTPATIENT)
Age: 54
End: 2024-02-26

## 2024-02-26 DIAGNOSIS — M67.432 GANGLION, LEFT WRIST: Chronic | ICD-10-CM

## 2024-02-26 DIAGNOSIS — Z98.890 OTHER SPECIFIED POSTPROCEDURAL STATES: Chronic | ICD-10-CM

## 2024-02-26 DIAGNOSIS — Z00.8 ENCOUNTER FOR OTHER GENERAL EXAMINATION: ICD-10-CM

## 2024-02-26 PROCEDURE — 73221 MRI JOINT UPR EXTREM W/O DYE: CPT | Mod: 26,RT

## 2024-02-26 PROCEDURE — 73221 MRI JOINT UPR EXTREM W/O DYE: CPT

## 2024-02-27 ENCOUNTER — APPOINTMENT (OUTPATIENT)
Dept: ORTHOPEDIC SURGERY | Facility: CLINIC | Age: 54
End: 2024-02-27

## 2024-03-01 DIAGNOSIS — M25.529 PAIN IN UNSPECIFIED ELBOW: ICD-10-CM

## 2024-03-01 DIAGNOSIS — T14.8XXA OTHER INJURY OF UNSPECIFIED BODY REGION, INITIAL ENCOUNTER: ICD-10-CM

## 2024-03-01 RX ORDER — TRAMADOL HYDROCHLORIDE 50 MG/1
50 TABLET, COATED ORAL
Qty: 14 | Refills: 0 | Status: ACTIVE | COMMUNITY
Start: 2024-03-01 | End: 1900-01-01

## 2024-03-12 ENCOUNTER — APPOINTMENT (OUTPATIENT)
Dept: ORTHOPEDIC SURGERY | Facility: CLINIC | Age: 54
End: 2024-03-12

## 2024-03-19 ENCOUNTER — APPOINTMENT (OUTPATIENT)
Dept: ORTHOPEDIC SURGERY | Facility: CLINIC | Age: 54
End: 2024-03-19
Payer: MEDICAID

## 2024-03-19 ENCOUNTER — NON-APPOINTMENT (OUTPATIENT)
Age: 54
End: 2024-03-19

## 2024-03-19 VITALS — WEIGHT: 225 LBS | BODY MASS INDEX: 32.21 KG/M2 | HEIGHT: 70 IN

## 2024-03-19 DIAGNOSIS — M77.10 LATERAL EPICONDYLITIS, UNSPECIFIED ELBOW: ICD-10-CM

## 2024-03-19 PROCEDURE — 99203 OFFICE O/P NEW LOW 30 MIN: CPT

## 2024-03-19 NOTE — END OF VISIT
[FreeTextEntry3] :  All medical record entries made by the Scribe were at my,  Dr. Jose Juan Elaine MD., direction and personally dictated by me on 03/19/2024. I have personally reviewed the chart and agree that the record accurately reflects my personal performance of the history, physical exam, assessment and plan.

## 2024-03-19 NOTE — ADDENDUM
[FreeTextEntry1] :  I, Miguel A Cottrell wrote this note acting as a scribe for Dr. Jose Juan Elaine on Mar 19, 2024.

## 2024-03-19 NOTE — HISTORY OF PRESENT ILLNESS
[de-identified] : Mr. JUNI BONILLA is a 53 year old RHD man presents today for initial evaluation of a right elbow pain for about a month. He denies any injury/trauma to the elbow. He notes that he does a lot of heavy lifting. The pain is localized to the lateral aspect of the right elbow. He was seen at a clinic in [place] where the provider referred him for an x-ray and an MRI of the right elbow. He was given Tramadol for the pain and a cortisone injection to the right shoulder. He was also given a Medrol dose pack which did not provide relief. He notes that the pain has is not resolving from Tylenol and Motrin. He has not tried PT at this time.

## 2024-03-19 NOTE — PHYSICAL EXAM
[de-identified] : EXAM: [id number] - MR ELBOW RT - ORDERED BY: BETZY MCGARRY  PROCEDURE DATE: 02/26/2024  INTERPRETATION: MRI OF THE RIGHT ELBOW  CLINICAL INFORMATION: Elbow pain after lifting injury TECHNIQUE: Multiplanar, multisequence MRI was obtained of the right elbow.  FINDINGS:  ANTERIOR MUSCLES/TENDONS: There is a small amount of fluid surrounding the insertional fibers of the biceps tendon. The insertional fibers are slightly increased in signal. The brachialis tendon appears preserved. POSTERIOR MUSCLES/TENDONS: The triceps tendon and musculature appears preserved. LATERAL ELBOW LIGAMENTS AND TENDONS: There is an intact lateral collateral ligamentous complex. Small tear noted within the origin of the common extensor tendon (series 2, image 26) superimposed on mild tendinosis. MEDIAL ELBOW LIGAMENTS AND TENDONS: There is an intact common flexor tendon. The ulnar collateral ligament is intact. CARTILAGE AND SUBCHONDRAL BONE: Preserved hyaline cartilage. No subchondral marrow edema. SYNOVIUM/JOINT FLUID: No joint effusion. BONE MARROW: No fracture or osteonecrosis. NEUROVASCULAR STRUCTURES: Preserved PERIPHERAL SOFT TISSUES: No soft tissue swelling.  IMPRESSION: 1. Tendinosis and mild intrasubstance tearing at the common extensor tendon in keeping with mild to moderate lateral epicondylitis. 2. Mild insertional biceps tendinosis with surrounding bursitis.  --- End of Report ---  JIM DAS MD; Attending Radiologist This document has been electronically signed. Feb 29 2024 12:46PM [de-identified] :  Patient is WDWN, alert, and in no acute distress. Breathing is unlabored. He is grossly oriented to person, place, and time.  Right Elbow:   Inspection/Palpation: Point tenderness at ECRB insertion into lateral epicondyle.   Range of Motion: Pain elicited with resisted wrist extension with elbow fully extended, resisted extension of the long fingers, maximal flexion of the wrist, passive wrist flexion in pronation.   Strength: Flexion and extension is limited.  Stability: No joint instability on provocative testing. No skin lesions or discoloration.

## 2024-03-19 NOTE — DISCUSSION/SUMMARY
[de-identified] :  The underlying pathophysiology was reviewed with the patient. XR films were reviewed with the patient. Discussed at length the nature of the patient's condition which is lateral epicondylitis.   Patient was advised to take OTC medications and topical analgesic for pain management.  He was advised to wear a carpal tunnel brace for the pain management.   RX: Voltaren 75mg  BID  All questions answered, understanding verbalized. Patient in agreement with plan of care.   Patient was advised to follow up in 6 weeks or as needed.

## 2024-03-20 ENCOUNTER — APPOINTMENT (OUTPATIENT)
Dept: FAMILY MEDICINE | Facility: CLINIC | Age: 54
End: 2024-03-20
Payer: MEDICAID

## 2024-03-20 VITALS
DIASTOLIC BLOOD PRESSURE: 88 MMHG | HEIGHT: 70 IN | RESPIRATION RATE: 18 BRPM | BODY MASS INDEX: 32.07 KG/M2 | TEMPERATURE: 97.3 F | WEIGHT: 224 LBS | SYSTOLIC BLOOD PRESSURE: 132 MMHG | OXYGEN SATURATION: 98 % | HEART RATE: 84 BPM

## 2024-03-20 DIAGNOSIS — G44.89 OTHER HEADACHE SYNDROME: ICD-10-CM

## 2024-03-20 DIAGNOSIS — R73.03 PREDIABETES.: ICD-10-CM

## 2024-03-20 DIAGNOSIS — R22.31 LOCALIZED SWELLING, MASS AND LUMP, RIGHT UPPER LIMB: ICD-10-CM

## 2024-03-20 PROCEDURE — 99214 OFFICE O/P EST MOD 30 MIN: CPT

## 2024-03-20 NOTE — HISTORY OF PRESENT ILLNESS
[FreeTextEntry8] : Mr Demetrius Bradford is a 54 yo male presents today for health concerns. Pt currently following up with orthopedist, for ongoing issues of right elbow pain, diagnosed with lateral epicondylitis of right elbow. Pt reports was prescribed tramadol lately, which brought pain down, but caused headaches. Pt reports now stopped tramadol. Pt currently started diclofenac prescribed by orthopedist. Pt advised good diet, hydration, avoid high sodium, high caffeine beverages. Pt also advised the diclofenac will help with the headaches, may use Tylenol intermittently as well for the headaches. Pt advised headache worsening, severe headache, the worse headache in his life, then go to ER. Secondly, pt also notes a painful lump under right axilla. Pt would like further evaluation. Advised today will order labs, and US of axilla. Pt advised to return next month for CPE visit.

## 2024-03-20 NOTE — PHYSICAL EXAM
[Well Nourished] : well nourished [No Acute Distress] : no acute distress [EOMI] : extraocular movements intact [Supple] : supple [Clear to Auscultation] : lungs were clear to auscultation bilaterally [Normal S1, S2] : normal S1 and S2 [No Edema] : there was no peripheral edema [Non-distended] : non-distended [No Joint Swelling] : no joint swelling [Normal Gait] : normal gait [Normal Affect] : the affect was normal [de-identified] : right axilla, lateral right chest, nipple line noted 1 cm movable mass.

## 2024-03-20 NOTE — ASSESSMENT
[FreeTextEntry1] : US right axilla c/w diclofenac as prescribed by orthopedist may add Tylenol if additional headache if headache persists and severe go to nearest ER Approximately 30 min of face to face time spent, reviewed chart, prior labwork, addressed various health concerns, ordered necessary new labs and/or imaging. Answered all pt questions, coordinated care for patient. Advised to return for CPE visit.

## 2024-03-20 NOTE — REVIEW OF SYSTEMS
[Earache] : no earache [Hearing Loss] : no hearing loss [Nosebleeds] : no nosebleeds [Nasal Discharge] : no nasal discharge [Postnasal Drip] : no postnasal drip [Sore Throat] : sore throat [Hoarseness] : no hoarseness [Headache] : headache [Dizziness] : no dizziness [Fainting] : no fainting [Confusion] : no confusion [Unsteady Walk] : no ataxia [Memory Loss] : no memory loss [Negative] : Heme/Lymph [de-identified] : right under armpit pain ful lump

## 2024-03-27 ENCOUNTER — OUTPATIENT (OUTPATIENT)
Dept: OUTPATIENT SERVICES | Facility: HOSPITAL | Age: 54
LOS: 1 days | End: 2024-03-27
Payer: MEDICAID

## 2024-03-27 ENCOUNTER — APPOINTMENT (OUTPATIENT)
Dept: ULTRASOUND IMAGING | Facility: CLINIC | Age: 54
End: 2024-03-27
Payer: MEDICAID

## 2024-03-27 DIAGNOSIS — R22.31 LOCALIZED SWELLING, MASS AND LUMP, RIGHT UPPER LIMB: ICD-10-CM

## 2024-03-27 DIAGNOSIS — Z98.890 OTHER SPECIFIED POSTPROCEDURAL STATES: Chronic | ICD-10-CM

## 2024-03-27 DIAGNOSIS — M67.432 GANGLION, LEFT WRIST: Chronic | ICD-10-CM

## 2024-03-27 DIAGNOSIS — Z00.8 ENCOUNTER FOR OTHER GENERAL EXAMINATION: ICD-10-CM

## 2024-03-27 PROCEDURE — 76882 US LMTD JT/FCL EVL NVASC XTR: CPT | Mod: 26,RT

## 2024-03-27 PROCEDURE — 76882 US LMTD JT/FCL EVL NVASC XTR: CPT

## 2024-04-09 ENCOUNTER — APPOINTMENT (OUTPATIENT)
Dept: ORTHOPEDIC SURGERY | Facility: CLINIC | Age: 54
End: 2024-04-09
Payer: MEDICAID

## 2024-04-09 VITALS — WEIGHT: 226 LBS | HEIGHT: 70 IN | BODY MASS INDEX: 32.35 KG/M2

## 2024-04-09 PROCEDURE — 20605 DRAIN/INJ JOINT/BURSA W/O US: CPT

## 2024-04-09 PROCEDURE — 99203 OFFICE O/P NEW LOW 30 MIN: CPT | Mod: 25

## 2024-04-09 NOTE — ADDENDUM
[FreeTextEntry1] :  I, Miguel A Cottrell wrote this note acting as a scribe for Dr. Jose Juan Elaine on Apr 09, 2024.

## 2024-04-09 NOTE — DISCUSSION/SUMMARY
[de-identified] :  The underlying pathophysiology was reviewed with the patient. XR films were reviewed with the patient. Discussed at length the nature of the patient's condition which is lateral epicondylitis.   Patient was advised to take OTC medications and topical analgesic for pain management.  He was advised to wear a carpal tunnel brace for the pain management.   The patient wishes to proceed with a cortisone injection at this time. The skin was prepped with alcohol and sprayed with Ethyl Chloride. An injection of 0.5 cc 1% Lidocaine without epinephrine, 0.25 cc Kenalog 40mg, and 0.25 cc Dexamethasone was administered into the Right external insertion of lateral epicondyle (#1). The patient tolerated the procedure well. Apply ice.   All questions answered, understanding verbalized. Patient in agreement with plan of care.   Patient was advised to follow up as needed.

## 2024-04-09 NOTE — END OF VISIT
[FreeTextEntry3] :  All medical record entries made by the Scribe were at my,  Dr. Jose Juan Elaine MD., direction and personally dictated by me on 04/09/2024. I have personally reviewed the chart and agree that the record accurately reflects my personal performance of the history, physical exam, assessment and plan.

## 2024-04-09 NOTE — HISTORY OF PRESENT ILLNESS
[de-identified] : Mr. JUNI BONILLA is a 53 year old RHD man presents today for initial evaluation of a right elbow pain for about a month. He denies any injury/trauma to the elbow. He notes that he does a lot of heavy lifting. The pain is localized to the lateral aspect of the right elbow. He was seen at a clinic in [place] where the provider referred him for an x-ray and an MRI of the right elbow. He was given Tramadol for the pain and a cortisone injection to the right shoulder. He was also given a Medrol dose pack which did not provide relief. He notes that the pain has is not resolving from Tylenol and Motrin. He has not tried PT at this time.   Today, Apr 09, 2024, patient is here for a follow up and further evaluation. He states that the medication prescribed to him which he took made him constipated and caused him discomfort. He is requesting a cortisone injection.

## 2024-04-09 NOTE — PHYSICAL EXAM
[de-identified] :  Patient is WDWN, alert, and in no acute distress. Breathing is unlabored. He is grossly oriented to person, place, and time.  Right Elbow:   Inspection/Palpation: Point tenderness at ECRB insertion into lateral epicondyle.   Range of Motion: Pain elicited with resisted wrist extension with elbow fully extended, resisted extension of the long fingers, maximal flexion of the wrist, passive wrist flexion in pronation.   Strength: Flexion and extension is limited.  Stability: No joint instability on provocative testing. No skin lesions or discoloration. [de-identified] : EXAM: [id number] - MR ELBOW RT - ORDERED BY: BETZY MCGARRY  PROCEDURE DATE: 02/26/2024  INTERPRETATION: MRI OF THE RIGHT ELBOW  CLINICAL INFORMATION: Elbow pain after lifting injury TECHNIQUE: Multiplanar, multisequence MRI was obtained of the right elbow.  FINDINGS:  ANTERIOR MUSCLES/TENDONS: There is a small amount of fluid surrounding the insertional fibers of the biceps tendon. The insertional fibers are slightly increased in signal. The brachialis tendon appears preserved. POSTERIOR MUSCLES/TENDONS: The triceps tendon and musculature appears preserved. LATERAL ELBOW LIGAMENTS AND TENDONS: There is an intact lateral collateral ligamentous complex. Small tear noted within the origin of the common extensor tendon (series 2, image 26) superimposed on mild tendinosis. MEDIAL ELBOW LIGAMENTS AND TENDONS: There is an intact common flexor tendon. The ulnar collateral ligament is intact. CARTILAGE AND SUBCHONDRAL BONE: Preserved hyaline cartilage. No subchondral marrow edema. SYNOVIUM/JOINT FLUID: No joint effusion. BONE MARROW: No fracture or osteonecrosis. NEUROVASCULAR STRUCTURES: Preserved PERIPHERAL SOFT TISSUES: No soft tissue swelling.  IMPRESSION: 1. Tendinosis and mild intrasubstance tearing at the common extensor tendon in keeping with mild to moderate lateral epicondylitis. 2. Mild insertional biceps tendinosis with surrounding bursitis.  --- End of Report ---  JIM DAS MD; Attending Radiologist This document has been electronically signed. Feb 29 2024 12:46PM

## 2024-04-11 ENCOUNTER — APPOINTMENT (OUTPATIENT)
Dept: SURGICAL ONCOLOGY | Facility: CLINIC | Age: 54
End: 2024-04-11
Payer: MEDICAID

## 2024-04-11 VITALS — BODY MASS INDEX: 32.43 KG/M2 | HEIGHT: 70 IN

## 2024-04-11 VITALS
OXYGEN SATURATION: 97 % | HEART RATE: 78 BPM | RESPIRATION RATE: 17 BRPM | WEIGHT: 226 LBS | SYSTOLIC BLOOD PRESSURE: 118 MMHG | DIASTOLIC BLOOD PRESSURE: 76 MMHG | HEIGHT: 61 IN | BODY MASS INDEX: 42.67 KG/M2

## 2024-04-11 DIAGNOSIS — R19.00 INTRA-ABDOMINAL AND PELVIC SWELLING, MASS AND LUMP, UNSPECIFIED SITE: ICD-10-CM

## 2024-04-11 PROCEDURE — 99215 OFFICE O/P EST HI 40 MIN: CPT

## 2024-04-15 ENCOUNTER — RX RENEWAL (OUTPATIENT)
Age: 54
End: 2024-04-15

## 2024-04-15 RX ORDER — DICLOFENAC SODIUM 75 MG/1
75 TABLET, DELAYED RELEASE ORAL
Qty: 60 | Refills: 0 | Status: ACTIVE | COMMUNITY
Start: 2024-03-19 | End: 1900-01-01

## 2024-05-02 ENCOUNTER — APPOINTMENT (OUTPATIENT)
Dept: PLASTIC SURGERY | Facility: CLINIC | Age: 54
End: 2024-05-02

## 2024-05-02 ENCOUNTER — OUTPATIENT (OUTPATIENT)
Dept: OUTPATIENT SERVICES | Facility: HOSPITAL | Age: 54
LOS: 1 days | End: 2024-05-02
Payer: MEDICAID

## 2024-05-02 VITALS
SYSTOLIC BLOOD PRESSURE: 133 MMHG | OXYGEN SATURATION: 98 % | BODY MASS INDEX: 32.35 KG/M2 | HEART RATE: 83 BPM | DIASTOLIC BLOOD PRESSURE: 81 MMHG | HEIGHT: 70 IN | WEIGHT: 226 LBS | TEMPERATURE: 98.1 F

## 2024-05-02 VITALS
RESPIRATION RATE: 18 BRPM | WEIGHT: 223.11 LBS | HEART RATE: 62 BPM | HEIGHT: 69.5 IN | TEMPERATURE: 97 F | DIASTOLIC BLOOD PRESSURE: 80 MMHG | OXYGEN SATURATION: 95 % | SYSTOLIC BLOOD PRESSURE: 128 MMHG

## 2024-05-02 DIAGNOSIS — Z01.818 ENCOUNTER FOR OTHER PREPROCEDURAL EXAMINATION: ICD-10-CM

## 2024-05-02 DIAGNOSIS — R19.00 INTRA-ABDOMINAL AND PELVIC SWELLING, MASS AND LUMP, UNSPECIFIED SITE: ICD-10-CM

## 2024-05-02 DIAGNOSIS — M67.432 GANGLION, LEFT WRIST: Chronic | ICD-10-CM

## 2024-05-02 DIAGNOSIS — Z98.890 OTHER SPECIFIED POSTPROCEDURAL STATES: Chronic | ICD-10-CM

## 2024-05-02 PROCEDURE — 80053 COMPREHEN METABOLIC PANEL: CPT

## 2024-05-02 PROCEDURE — 36415 COLL VENOUS BLD VENIPUNCTURE: CPT

## 2024-05-02 PROCEDURE — 85027 COMPLETE CBC AUTOMATED: CPT

## 2024-05-02 PROCEDURE — 93005 ELECTROCARDIOGRAM TRACING: CPT

## 2024-05-02 PROCEDURE — G0463: CPT

## 2024-05-02 PROCEDURE — 93010 ELECTROCARDIOGRAM REPORT: CPT | Mod: NC

## 2024-05-02 PROCEDURE — XXXXX: CPT | Mod: 1L

## 2024-05-02 RX ORDER — SODIUM CHLORIDE 9 MG/ML
1000 INJECTION, SOLUTION INTRAVENOUS
Refills: 0 | Status: DISCONTINUED | OUTPATIENT
Start: 2024-05-08 | End: 2024-05-22

## 2024-05-02 NOTE — H&P PST ADULT - ATTENDING COMMENTS
53-year-old man.    Enlarging, symptomatic, soft tissue mass of the right flank.    He is scheduled for excision, with IntraOp sonography for preoperative identification, and in conjunction with plastic surgery (Dr. Jensen Nguyen) for reconstruction of the anticipated defect.    oncologic diagnosis, surgical approach, risks, benefits and possible surgical outcomes reviewed in detail, all questions answered.    Consent on chart

## 2024-05-02 NOTE — H&P PST ADULT - MUSCULOSKELETAL
normal/ROM intact/normal gait/strength 5/5 bilateral upper extremities/strength 5/5 bilateral lower extremities negative normal/ROM intact/no calf tenderness/normal gait

## 2024-05-02 NOTE — ED ADULT NURSE NOTE - NURSING SKIN RASH LOCATION #1
Patients wife is calling because her  has not met the criteria to move up to the next level of Mounjaro so he needs the 7.5 The patient has not had 2 consecutive readings of under 100 so he has not increased to the 10 or the 12.5   Please call the wife to let her know that this has been sent.   flank

## 2024-05-02 NOTE — H&P PST ADULT - PROBLEM SELECTOR PLAN 1
Scheduled for resection right lower back mass with Dr Sal and Dr Nguyen on 08/03/2022.  COVID-19 testing information provided.   Pre op instructions given and patient verbalized understanding.  CBC, CMP pending.  EKG and chest x ray on chart.  NPO after midnight night before procedure.  To stop all ASA, NSAIDs, vitamins and supplements 1 week prior to procedure.  Chlorhexidine wash given with instructions.  MERI precautions - STOP BANG 3 Patient provided with pre-operative instructions and verbalized understanding.  Patient will be NPO on day of surgery. Patient will stop NSAIDs, aspirin, herbal supplements or vitamins 1 week prior to surgery.  Chlorhexidine wash provided with instructions, verbalized understanding.

## 2024-05-02 NOTE — H&P PST ADULT - GASTROINTESTINAL
negative normal/soft/nontender/nondistended/normal active bowel sounds soft/nontender/nondistended details…

## 2024-05-02 NOTE — H&P PST ADULT - HISTORY OF PRESENT ILLNESS
50 y/o male with PMH of fatty liver presents for PSt.  C/o lower back pain due to deep tissue mass that he felt at home.  Was evaluated with sonogram and recommended for surgical removal.  Feeling well at PST to day with no c/o of cough, fever or recent illness.   Scheduled for resection right lower back mass with Dr Sal and Dr Nguyen on 08/03/2022.  COVID-19 testing information provided.  Patient is a 53 year old M with PMHx of fatty liver presents for perioperative testing for excision of right flank mass with Dr. Rafa Sal scheduled for 05/08/2024. Reports feeling a lump to his right flank 3 months ago, seen by PCP, had imaging, referred to surgical oncologist, therefore opting for upcoming procedure. Denies any acute symptoms at this time. Patient otherwise feels well overall.

## 2024-05-02 NOTE — H&P PST ADULT - CARDIOVASCULAR
normal/regular rate and rhythm/S1 S2 present/no gallops/no rub/no murmur negative normal/regular rate and rhythm/S1 S2 present details…

## 2024-05-02 NOTE — H&P PST ADULT - RESPIRATORY
normal/clear to auscultation bilaterally/no wheezes/no rales/no rhonchi normal/clear to auscultation bilaterally/no wheezes/airway patent/breath sounds equal/good air movement

## 2024-05-03 NOTE — ED PROVIDER NOTE - CPE EDP GASTRO NORM
Adult Outpatient Nutrition  Assessment    Patient Name:  Vivien Rosa  YOB: 1978  MRN: [id number]    Assessment Date:  5/3/2024    Comments:      Met w/ patient and patient's  for initial nutrition assessment regarding recent diagnosis of type 2 diabetes. Pt reports she was diagnosed back in February with an A1C of 6.5%. Pt reports since then she has decreased her carbohydrate intake and has switched to zero sugar sweeteners and sodas.     Reviewed the MyPlate method and recommended portions of carbohydrate-containing foods. Recommended patient pair a protein with a carbohydrate at each meal and snack time. Provided patient with recommended carbohydrate goal of 180g of carbohydrates per day. Provided high protein food and snack list. Pt agreeable and notes goals are feasible. Pt w/ RD contact information and is encouraged to reach out at any time to make a follow-up appointment. RD available PRN.     Electronically signed by:  Shivani Cha RD  05/03/24 10:02 EDT      normal...

## 2024-05-07 ENCOUNTER — TRANSCRIPTION ENCOUNTER (OUTPATIENT)
Age: 54
End: 2024-05-07

## 2024-05-07 NOTE — ASU PATIENT PROFILE, ADULT - FALL HARM RISK - UNIVERSAL INTERVENTIONS
Bed in lowest position, wheels locked, appropriate side rails in place/Call bell, personal items and telephone in reach/Instruct patient to call for assistance before getting out of bed or chair/Non-slip footwear when patient is out of bed/[place] to call system/Physically safe environment - no spills, clutter or unnecessary equipment/Purposeful Proactive Rounding/Room/bathroom lighting operational, light cord in reach

## 2024-05-08 ENCOUNTER — RESULT REVIEW (OUTPATIENT)
Age: 54
End: 2024-05-08

## 2024-05-08 ENCOUNTER — APPOINTMENT (OUTPATIENT)
Dept: PLASTIC SURGERY | Facility: HOSPITAL | Age: 54
End: 2024-05-08

## 2024-05-08 ENCOUNTER — OUTPATIENT (OUTPATIENT)
Dept: OUTPATIENT SERVICES | Facility: HOSPITAL | Age: 54
LOS: 1 days | End: 2024-05-08
Payer: MEDICAID

## 2024-05-08 ENCOUNTER — TRANSCRIPTION ENCOUNTER (OUTPATIENT)
Age: 54
End: 2024-05-08

## 2024-05-08 ENCOUNTER — APPOINTMENT (OUTPATIENT)
Dept: SURGICAL ONCOLOGY | Facility: HOSPITAL | Age: 54
End: 2024-05-08

## 2024-05-08 VITALS
DIASTOLIC BLOOD PRESSURE: 86 MMHG | HEART RATE: 64 BPM | SYSTOLIC BLOOD PRESSURE: 134 MMHG | WEIGHT: 223.11 LBS | OXYGEN SATURATION: 96 % | RESPIRATION RATE: 14 BRPM | HEIGHT: 69.5 IN | TEMPERATURE: 98 F

## 2024-05-08 VITALS
OXYGEN SATURATION: 93 % | DIASTOLIC BLOOD PRESSURE: 76 MMHG | SYSTOLIC BLOOD PRESSURE: 120 MMHG | TEMPERATURE: 97 F | RESPIRATION RATE: 16 BRPM | HEART RATE: 60 BPM

## 2024-05-08 DIAGNOSIS — Z98.890 OTHER SPECIFIED POSTPROCEDURAL STATES: Chronic | ICD-10-CM

## 2024-05-08 DIAGNOSIS — D48.7 NEOPLASM OF UNCERTAIN BEHAVIOR OF OTHER SPECIFIED SITES: ICD-10-CM

## 2024-05-08 DIAGNOSIS — R19.00 INTRA-ABDOMINAL AND PELVIC SWELLING, MASS AND LUMP, UNSPECIFIED SITE: ICD-10-CM

## 2024-05-08 PROCEDURE — 21931 EXC BACK LES SC 3 CM/>: CPT | Mod: AS

## 2024-05-08 PROCEDURE — 76604 US EXAM CHEST: CPT | Mod: 26

## 2024-05-08 PROCEDURE — 76604 US EXAM CHEST: CPT

## 2024-05-08 PROCEDURE — 88304 TISSUE EXAM BY PATHOLOGIST: CPT | Mod: 26

## 2024-05-08 PROCEDURE — 88304 TISSUE EXAM BY PATHOLOGIST: CPT

## 2024-05-08 PROCEDURE — 21931 EXC BACK LES SC 3 CM/>: CPT

## 2024-05-08 PROCEDURE — 14301 TIS TRNFR ANY 30.1-60 SQ CM: CPT

## 2024-05-08 PROCEDURE — C1889: CPT

## 2024-05-08 DEVICE — ARISTA 3GR: Type: IMPLANTABLE DEVICE | Site: RIGHT | Status: FUNCTIONAL

## 2024-05-08 RX ORDER — HYDROMORPHONE HYDROCHLORIDE 2 MG/ML
0.5 INJECTION INTRAMUSCULAR; INTRAVENOUS; SUBCUTANEOUS
Refills: 0 | Status: DISCONTINUED | OUTPATIENT
Start: 2024-05-08 | End: 2024-05-08

## 2024-05-08 RX ORDER — SODIUM CHLORIDE 9 MG/ML
1000 INJECTION, SOLUTION INTRAVENOUS
Refills: 0 | Status: DISCONTINUED | OUTPATIENT
Start: 2024-05-08 | End: 2024-05-08

## 2024-05-08 RX ORDER — ONDANSETRON 8 MG/1
4 TABLET, FILM COATED ORAL ONCE
Refills: 0 | Status: DISCONTINUED | OUTPATIENT
Start: 2024-05-08 | End: 2024-05-08

## 2024-05-08 RX ORDER — OXYCODONE HYDROCHLORIDE 5 MG/1
1 TABLET ORAL
Qty: 10 | Refills: 0
Start: 2024-05-08

## 2024-05-08 RX ORDER — HEPARIN SODIUM 5000 [USP'U]/ML
5000 INJECTION INTRAVENOUS; SUBCUTANEOUS ONCE
Refills: 0 | Status: COMPLETED | OUTPATIENT
Start: 2024-05-08 | End: 2024-05-08

## 2024-05-08 RX ADMIN — HYDROMORPHONE HYDROCHLORIDE 0.5 MILLIGRAM(S): 2 INJECTION INTRAMUSCULAR; INTRAVENOUS; SUBCUTANEOUS at 10:08

## 2024-05-08 RX ADMIN — SODIUM CHLORIDE 50 MILLILITER(S): 9 INJECTION, SOLUTION INTRAVENOUS at 07:18

## 2024-05-08 RX ADMIN — HYDROMORPHONE HYDROCHLORIDE 0.5 MILLIGRAM(S): 2 INJECTION INTRAMUSCULAR; INTRAVENOUS; SUBCUTANEOUS at 10:26

## 2024-05-08 RX ADMIN — HEPARIN SODIUM 5000 UNIT(S): 5000 INJECTION INTRAVENOUS; SUBCUTANEOUS at 07:48

## 2024-05-08 RX ADMIN — HYDROMORPHONE HYDROCHLORIDE 0.5 MILLIGRAM(S): 2 INJECTION INTRAMUSCULAR; INTRAVENOUS; SUBCUTANEOUS at 10:44

## 2024-05-08 NOTE — ASU DISCHARGE PLAN (ADULT/PEDIATRIC) - CARE PROVIDER_API CALL
Jensen Nguyen)  Plastic Surgery  [address] [place] [address], [address]  [place] [address]  Phone: [phone]  Fax: [phone]  Follow Up Time: 1 week

## 2024-05-08 NOTE — ASU PREOP CHECKLIST - 2.
D/w pt- defes daily medication if possible- cecille ricci to start   emotional support and preop teaching provided to pt and family.

## 2024-05-08 NOTE — ASU DISCHARGE PLAN (ADULT/PEDIATRIC) - PATIENT EDUCATION MATERIALS PROVIED
Pre-printed instructions given for other (specify) tylenol, oxycodone/Pre-printed instructions given for other (specify)

## 2024-05-08 NOTE — BRIEF OPERATIVE NOTE - NSICDXBRIEFPROCEDURE_GEN_ALL_CORE_FT
PROCEDURES:  Excision, mass, chest wall, with reconstruction 08-May-2024 09:46:57 Right lateral chest wall, subcutaneous w/ primary closure by plastics Elena Calvo   PROCEDURES:  Excision, lesion, benign, torso, 3.1 cm to 4.0 cm in diameter 08-May-2024 10:12:56 R lateral chest Elena Calvo

## 2024-05-08 NOTE — BRIEF OPERATIVE NOTE - NSICDXBRIEFPROCEDURE_GEN_ALL_CORE_FT
PROCEDURES:  Excision, lesion, benign, torso, 3.1 cm to 4.0 cm in diameter 08-May-2024 10:12:56 R lateral chest, with primary local flap closure Elena Calvo

## 2024-05-08 NOTE — ASU DISCHARGE PLAN (ADULT/PEDIATRIC) - ASU DC SPECIAL INSTRUCTIONSFT
Initial followup with plastic surgery in the next 5-15 days, regarding matters of bandages, activities, and showering.    Dr. Sal should call with pathology report in approximately 2 weeks.  The conversation will determine further management. May shower in 24-48 hours. Do not scrub or submerge incision in water. Allow soapy water to run over incision and pat to dry with a clean towel. You have steri strips over your incision site, it will peel off in 1-2 weeks, do not pick at it.  Follow up with Dr. Nguyen in 1 week, call office to schedule appointment. May call physician sooner with any questions or concerns.    May take over the counter Tylenol 650-975mg every 6 hours as needed for pain. Can take additional Oxycodone as prescribed for severe pain as needed. Do not drive while taking prescribed narcotic pain medications.    Please notify MD sooner or return to ER with any new or worsening symptoms, uncontrollable pain, foul smelling discharge or drainage from wound, excessive bleeding or swelling, nausea/vomiting, or other concerns/problems.    - - - - - - - - -   Initial followup with plastic surgery in the next 5-15 days, regarding matters of bandages, activities, and showering.    Dr. Sal should call with pathology report in approximately 2 weeks.  The conversation will determine further management.

## 2024-05-08 NOTE — BRIEF OPERATIVE NOTE - NSICDXBRIEFPOSTOP_GEN_ALL_CORE_FT
POST-OP DIAGNOSIS:  Mass of right chest wall 08-May-2024 09:48:52 Lateral chest wall, soft tissue mass Elena Calvo  
POST-OP DIAGNOSIS:  Mass of right chest wall 08-May-2024 09:48:52 Lateral chest wall, soft tissue mass Elena Calvo

## 2024-05-08 NOTE — BRIEF OPERATIVE NOTE - NSICDXBRIEFPREOP_GEN_ALL_CORE_FT
PRE-OP DIAGNOSIS:  Mass of right chest wall 08-May-2024 09:47:55 Lateral chest wall, soft tissue mass Elena Calvo  
PRE-OP DIAGNOSIS:  Mass of right chest wall 08-May-2024 09:47:55 Lateral chest wall, soft tissue mass Elena Calvo

## 2024-05-09 NOTE — ED PROVIDER NOTE - PSH
H/O excision of mass  left thigh benign  History of drainage of abscess  2012 anorectal  History of excision of mass  right leg 6/2018  S/P colonoscopy  2012  S/P hernia repair Patient Unaware

## 2024-05-13 LAB — SURGICAL PATHOLOGY STUDY: SIGNIFICANT CHANGE UP

## 2024-05-13 NOTE — HISTORY OF PRESENT ILLNESS
[de-identified] : ***[facility] REFERRED [facility].  53-year-old man.  I last saw him October 2023 at which time he was asymptomatic, with a normal physical examination, and no worrisome findings on imaging studies.   CC: He presents today with a ~2-month history of a tender palpable lump in the upper right flank that he first noticed while in the shower. He does not recall injuring the area. He thinks it has grown a little bit. He has no other specific or constitutional signs or symptoms.  March 27, 2024: Targeted sonogram of the right flank at [facility]. Palpable mass corresponds to a 3.2 x 0.7 x 1.3 cm lipoma.   + History of left posterior thigh schwannoma, inner right leg dermatofibroma, and left breast mass.  Also concerned about a prominence of the right chest on examination, without any imaging correlate (below).   8/25/2022: He reported a "many-year history" of an asymptomatic stable palpable finding involving the medial right chest. My PE: Prominent costosternal junction.  9/17/2022: Chest sonogram at [facility]: No sonographic abnormality corresponding to the patient's area of concern. Accompanying chest x-ray: No radiographic evidence of soft tissue mass. No pulmonary disease.  + Prior personal history August 2022: Resection of a symptomatic soft tissue mass from the mid back. Plastics: Dr. Jensen Nguyen. 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  May 2021: Excision of a 2.5 cm lipoma, and 3.5 cm angiolipoma, both from the proximal, posterior, right arm. Plastics: Dr. Jensen Nguyen.  + Known subcutaneous nodule of the right parietal/occipital scalp.   + history of a schwannoma of his posterior LEFT THIGH  February 2017: Resection of a 6 cm schwannoma from the posterior LEFT THIGH, with neurosurgery (Dr. George HERNANDES), and reconstruction by Dr. Jensen Nguyen. Postoperative course was prolonged by severe constipation from overuse of narcotic analgesics, requiring readmission.   November 2016 presented to general surgery clinic with a 7-8-year history of an enlarging mass in the posterior left thigh.  Summer 2020 episode of shingles.  No personal history of malignancy.   + FH: Spring 2020, son in [place] dx'd and being treated for breast cancer.   ~January 2018 (cannot specify duration) he noticed a nodule on the inner aspect of his right leg.  He did not recall injuring the area. 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 July 2018 Excision (By me, with reconstruction by Dr. Nguyen) demonstrated dermatofibroma, with microscopic involvement of the margins. August 2018 re-excision (By me, with reconstruction by Dr. Nguyen) obtained negative margins.  February 2021: Excision of a left wrist mass by Dr. Nguyen. Pathology: Mature adipose tissue and fibrous connective tissue with mild edema  July 2019, he had excision of a left wrist ganglion cyst by Dr. Jensen Nguyen.   His internist is Dr. Slava THOMPSON. He is followed in our medicine clinic.  NKDA.  No pacemaker or defibrillator. No anticoagulants.  He has no significant past medical history.  He takes no regular medications   Colonoscopy at age 46 okay x10 years.

## 2024-05-13 NOTE — REVIEW OF SYSTEMS
[Negative] : Heme/Lymph [FreeTextEntry5] : NKDA [de-identified] : History of benign soft tissue neoplasms [de-identified] : History of schwannoma

## 2024-05-13 NOTE — ASSESSMENT
[FreeTextEntry1] : 53-year-old man with a tender lump on the upper right flank that on imaging appears to be a subcutaneous lipoma. Typically, these are asymptomatic, but clearly there is tenderness to direct palpation. There are no worrisome features on imaging.  He would like the area excised for diagnostic purposes and symptomatic relief. Will schedule with intraoperative radiology in the event that the mass is not palpable once he is under anesthesia in the lateral position.  Will also enlist the help of plastics, Dr. Jensen Nguyen, who has been involved in his previous care.  Operative approach, risk, benefits, alternatives, possible surgical options reviewed in detail. All questions answered.  He like to proceed with the operation. Paperwork for scheduling submitted.   05/13/2024: We spoke on the phone. May 8, 2024, he had excision of an uncomfortable mass from the upper right flank. Plastics: Dr. Jensen Nguyen. Surgical pathology: 4.5 cm lipoma. Clinically completely removed. Presently, no further intervention is warranted.

## 2024-05-16 ENCOUNTER — APPOINTMENT (OUTPATIENT)
Dept: PLASTIC SURGERY | Facility: CLINIC | Age: 54
End: 2024-05-16
Payer: MEDICAID

## 2024-05-16 VITALS
WEIGHT: 226 LBS | SYSTOLIC BLOOD PRESSURE: 149 MMHG | BODY MASS INDEX: 32.35 KG/M2 | OXYGEN SATURATION: 95 % | DIASTOLIC BLOOD PRESSURE: 91 MMHG | HEART RATE: 75 BPM | TEMPERATURE: 98.2 F | HEIGHT: 70 IN

## 2024-05-16 DIAGNOSIS — D49.89 NEOPLASM OF UNSPECIFIED BEHAVIOR OF OTHER SPECIFIED SITES: ICD-10-CM

## 2024-05-16 PROCEDURE — 99024 POSTOP FOLLOW-UP VISIT: CPT

## 2024-05-16 NOTE — END OF VISIT
[FreeTextEntry3] :  All medical record entries made by the Scribe were at my, Dr. Jensen Nguyen MD, direction and personally dictated by me on  05/16/2024. I have reviewed the chart and agree that the record accurately reflects my personal performance of the history, physical exam, assessment and plan. I have also personally directed, reviewed, and agreed with the chart.

## 2024-05-16 NOTE — ADDENDUM
[FreeTextEntry1] :  I, Regan Wilkerson, documented this note as a scribe on behalf of Dr. Jensen Nguyen MD on  05/16/2024.

## 2024-05-16 NOTE — HISTORY OF PRESENT ILLNESS
[FreeTextEntry1] : This is a 53 year male who presents for post op visit s/p reconstruction of right flank wound with local flap. DOS:5/8/24. Patient pain well controlled, dressings are in place. Otherwise no other complaints or concerns; denies fever, sweats, or chills.

## 2024-05-20 PROBLEM — D49.89: Status: ACTIVE | Noted: 2024-05-20

## 2024-06-05 ENCOUNTER — APPOINTMENT (OUTPATIENT)
Dept: FAMILY MEDICINE | Facility: CLINIC | Age: 54
End: 2024-06-05
Payer: MEDICAID

## 2024-06-05 ENCOUNTER — NON-APPOINTMENT (OUTPATIENT)
Age: 54
End: 2024-06-05

## 2024-06-05 VITALS
SYSTOLIC BLOOD PRESSURE: 142 MMHG | HEIGHT: 70 IN | WEIGHT: 226 LBS | HEART RATE: 81 BPM | TEMPERATURE: 97.1 F | OXYGEN SATURATION: 96 % | BODY MASS INDEX: 32.35 KG/M2 | RESPIRATION RATE: 16 BRPM | DIASTOLIC BLOOD PRESSURE: 80 MMHG

## 2024-06-05 DIAGNOSIS — R07.89 OTHER CHEST PAIN: ICD-10-CM

## 2024-06-05 DIAGNOSIS — Z00.00 ENCOUNTER FOR GENERAL ADULT MEDICAL EXAMINATION W/OUT ABNORMAL FINDINGS: ICD-10-CM

## 2024-06-05 DIAGNOSIS — M54.9 DORSALGIA, UNSPECIFIED: ICD-10-CM

## 2024-06-05 DIAGNOSIS — G47.9 SLEEP DISORDER, UNSPECIFIED: ICD-10-CM

## 2024-06-05 DIAGNOSIS — K64.9 UNSPECIFIED HEMORRHOIDS: ICD-10-CM

## 2024-06-05 DIAGNOSIS — R53.82 CHRONIC FATIGUE, UNSPECIFIED: ICD-10-CM

## 2024-06-05 DIAGNOSIS — Z86.008 PERSONAL HISTORY OF IN-SITU NEOPLASM OF OTHER SITE: ICD-10-CM

## 2024-06-05 DIAGNOSIS — K21.9 GASTRO-ESOPHAGEAL REFLUX DISEASE W/OUT ESOPHAGITIS: ICD-10-CM

## 2024-06-05 DIAGNOSIS — D17.9 BENIGN LIPOMATOUS NEOPLASM, UNSPECIFIED: ICD-10-CM

## 2024-06-05 DIAGNOSIS — Z86.59 PERSONAL HISTORY OF OTHER MENTAL AND BEHAVIORAL DISORDERS: ICD-10-CM

## 2024-06-05 PROCEDURE — 99214 OFFICE O/P EST MOD 30 MIN: CPT | Mod: 25

## 2024-06-05 PROCEDURE — 93000 ELECTROCARDIOGRAM COMPLETE: CPT

## 2024-06-05 PROCEDURE — 99396 PREV VISIT EST AGE 40-64: CPT

## 2024-06-05 NOTE — PHYSICAL EXAM
[No Acute Distress] : no acute distress [Well Nourished] : well nourished [EOMI] : extraocular movements intact [Supple] : supple [Clear to Auscultation] : lungs were clear to auscultation bilaterally [Normal S1, S2] : normal S1 and S2 [No Edema] : there was no peripheral edema [Non-distended] : non-distended [No Joint Swelling] : no joint swelling [Normal Gait] : normal gait [Normal Affect] : the affect was normal [de-identified] : healed surgical incision under right axilla

## 2024-06-05 NOTE — REVIEW OF SYSTEMS
[Sore Throat] : sore throat [Headache] : headache [Negative] : Heme/Lymph [Earache] : no earache [Hearing Loss] : no hearing loss [Nosebleeds] : no nosebleeds [Postnasal Drip] : no postnasal drip [Nasal Discharge] : no nasal discharge [Hoarseness] : no hoarseness [Dizziness] : no dizziness [Fainting] : no fainting [Confusion] : no confusion [Unsteady Walk] : no ataxia [Memory Loss] : no memory loss

## 2024-06-05 NOTE — ASSESSMENT
[FreeTextEntry1] : completed physical exam, blood work and urinalysis ordered today, will follow up accordingly. referral to dermatology, skin cancer screening HCM: colonoscopy up to date, next due 2028 vaccines, shingrix deferred for late date flu vaccine deferred till fall COVID-19 vaccine up to date. Advised to get annual booster vaccine. Tdap to up to date vitals, ecg stable advised further weight loss, low sodium diet, limit caffeinated drinks Pt screened using STOP-BANG, positive screening of score greater than 3. This pt has moderate to high pre test probability of Obstructive Sleep Apnea. This assessment is based on a high STOP-BANG score, medical and clinical examination. As a result, diagnostic polysomnography is recommended to determine the presence and severity of this disorder.

## 2024-06-05 NOTE — HISTORY OF PRESENT ILLNESS
[FreeTextEntry1] : comprehensive visit [de-identified] : Mr. Demetrius Bradford is a 52 yo male presents today for his annual comprehensive and lab work. Pt reports lately also got mass under his right axilla, pathology showing benign angiolipoma. Pt report pain better under right axilla. Pt today reports feels well, no acute new health concerns today. States feels unusually tired, pt also report snoring at night. STOP BANG screening conducted today.

## 2024-06-05 NOTE — HEALTH RISK ASSESSMENT
[Good] : ~his/her~ current health as good [Very Good] : ~his/her~  mood as very good [No] : In the past 12 months have you used drugs other than those required for medical reasons? No [No falls in past year] : Patient reported no falls in the past year [0] : 2) Feeling down, depressed, or hopeless: Not at all (0) [PHQ-2 Negative - No further assessment needed] : PHQ-2 Negative - No further assessment needed [Never] : Never [Patient reported colonoscopy was normal] : Patient reported colonoscopy was normal [HIV Test offered] : HIV Test offered [Hepatitis C test offered] : Hepatitis C test offered [None] : None [With Family] : lives with family [Employed] : employed [High School] : high school [] :  [# Of Children ___] : has [unfilled] children [Sexually Active] : sexually active [Feels Safe at Home] : Feels safe at home [Fully functional (bathing, dressing, toileting, transferring, walking, feeding)] : Fully functional (bathing, dressing, toileting, transferring, walking, feeding) [Fully functional (using the telephone, shopping, preparing meals, housekeeping, doing laundry, using] : Fully functional and needs no help or supervision to perform IADLs (using the telephone, shopping, preparing meals, housekeeping, doing laundry, using transportation, managing medications and managing finances) [Smoke Detector] : smoke detector [Carbon Monoxide Detector] : carbon monoxide detector [Safety elements used in home] : safety elements used in home [Seat Belt] :  uses seat belt [Sunscreen] : uses sunscreen [With Patient/Caregiver] : , with patient/caregiver [Reviewed no changes] : Reviewed, no changes [Name: ___] : Health Care Proxy's Name: [unfilled]  [Relationship: ___] : Relationship: [unfilled] [Aggressive treatment] : aggressive treatment [Audit-CScore] : 0 [[id number]] : 0 [Change in mental status noted] : No change in mental status noted [Language] : denies difficulty with language [Behavior] : denies difficulty with behavior [Learning/Retaining New Information] : denies difficulty learning/retaining new information [Handling Complex Tasks] : denies difficulty handling complex tasks [Reasoning] : denies difficulty with reasoning [Spatial Ability and Orientation] : denies difficulty with spatial ability and orientation [High Risk Behavior] : no high risk behavior [Reports changes in hearing] : Reports no changes in hearing [Reports changes in vision] : Reports no changes in vision [Reports changes in dental health] : Reports no changes in dental health [Guns at Home] : no guns at home [ColonoscopyDate] : 06/2018 [ColonoscopyComments] : repeat in 10 years, Jun 2028.  [de-identified] : dentist twice a year [AdvancecareDate] : 06/05/2024

## 2024-06-27 ENCOUNTER — OUTPATIENT (OUTPATIENT)
Dept: OUTPATIENT SERVICES | Facility: HOSPITAL | Age: 54
LOS: 1 days | End: 2024-06-27
Payer: MEDICAID

## 2024-06-27 DIAGNOSIS — Z98.890 OTHER SPECIFIED POSTPROCEDURAL STATES: Chronic | ICD-10-CM

## 2024-06-27 DIAGNOSIS — G47.33 OBSTRUCTIVE SLEEP APNEA (ADULT) (PEDIATRIC): ICD-10-CM

## 2024-06-27 DIAGNOSIS — M67.432 GANGLION, LEFT WRIST: Chronic | ICD-10-CM

## 2024-06-27 PROCEDURE — 95800 SLP STDY UNATTENDED: CPT

## 2024-06-27 PROCEDURE — 95800 SLP STDY UNATTENDED: CPT | Mod: 26

## 2024-06-27 NOTE — ADDENDUM
[FreeTextEntry1] :  I, Regan Wilkerson, documented this note as a scribe on behalf of Dr. Jensen Nguyen MD on 05/02/2024

## 2024-06-27 NOTE — HISTORY OF PRESENT ILLNESS
[FreeTextEntry1] : JUNI BONILLA is a 53 year old male presenting to the office today complaining of a right flank mass, that has been present for about 3 months. He reports the mass is painful. Denies any history of trauma to the area.   PMHx- denied   PSHx- Ganglion cyst excision, Perirectal Abscess I&D, Colonoscopy   Currently taking - Cefadroxil, Diclofenac Sodium, Doxycycline Hyclate, Fluticasone Propionate, Meloxicam, methylpredniSolone, Montelukast Sodium, Omeprazole, tramadol HCl  Allergies- NKDA  Former smoker  Family history- denied

## 2024-06-27 NOTE — END OF VISIT
[FreeTextEntry3] :   All medical record entries made by the Scribe were at my, Dr. Jensen Nguyen MD, direction and personally dictated by me on 05/02/2024. I have reviewed the chart and agree that the record accurately reflects my personal performance of the history, physical exam, assessment and plan. I have also personally directed, reviewed, and agreed with the chart.

## 2024-06-27 NOTE — PHYSICAL EXAM
[de-identified] : right flank with palpable mass, tender measures approx. 3-4cm skin appears uninvolved

## 2024-07-08 PROBLEM — G47.33 OSA (OBSTRUCTIVE SLEEP APNEA): Status: ACTIVE | Noted: 2024-07-08

## 2024-07-15 ENCOUNTER — APPOINTMENT (OUTPATIENT)
Dept: DERMATOLOGY | Facility: CLINIC | Age: 54
End: 2024-07-15

## 2024-08-01 ENCOUNTER — APPOINTMENT (OUTPATIENT)
Dept: PLASTIC SURGERY | Facility: CLINIC | Age: 54
End: 2024-08-01
Payer: MEDICAID

## 2024-08-01 VITALS
HEART RATE: 93 BPM | HEIGHT: 70 IN | OXYGEN SATURATION: 97 % | BODY MASS INDEX: 32.35 KG/M2 | TEMPERATURE: 98 F | WEIGHT: 226 LBS | SYSTOLIC BLOOD PRESSURE: 137 MMHG | DIASTOLIC BLOOD PRESSURE: 90 MMHG

## 2024-08-01 DIAGNOSIS — R22.30 LOCALIZED SWELLING, MASS AND LUMP, UNSPECIFIED UPPER LIMB: ICD-10-CM

## 2024-08-01 DIAGNOSIS — L91.0 HYPERTROPHIC SCAR: ICD-10-CM

## 2024-08-01 DIAGNOSIS — L90.5 SCAR CONDITIONS AND FIBROSIS OF SKIN: ICD-10-CM

## 2024-08-01 PROCEDURE — 99024 POSTOP FOLLOW-UP VISIT: CPT

## 2024-08-02 PROBLEM — L91.0 SCAR, HYPERTROPHIC: Status: ACTIVE | Noted: 2024-08-02

## 2024-08-02 PROBLEM — L90.5 SCAR ADHERENT: Status: ACTIVE | Noted: 2024-08-02

## 2024-08-02 NOTE — PHYSICAL EXAM
[TextEntry] : RUE: scar tethered with widening and hypertrophy.  Hypopigmentation noted, no open areas

## 2024-08-02 NOTE — HISTORY OF PRESENT ILLNESS
[FreeTextEntry1] : JUNI BONILLA is a 53 year M who is s/p reconstruction of right flank wound with local flap. DOS:5/8/24.  Patient endorses RUE scar with tethering and occasional tenderness.  Otherwise denies cp, sob, subjective fever, chills, headache, cough, myalgia, malaise, abd pain, n/v/d, decreased appetite, and generalized weakness.

## 2024-08-02 NOTE — ADDENDUM
[FreeTextEntry1] :  This note was written by Marisela Guerra on 08/01/2024 acting as a medical scribe for Dr. Jensen Nguyen MD. - - -

## 2024-08-02 NOTE — ADDENDUM
[FreeTextEntry1] :  This note was written by Marisela Guerra on 08/01/2024 acting as a medical scribe for Dr. Jensen Nguyen MD.

## 2024-08-02 NOTE — END OF VISIT
[FreeTextEntry3] :  All medical entries made by the scribe were at my, Dr. Jensen Nguyen's, direction and personally dictated by me on 08/01/2024 . I have reviewed the chart and agree that the record accurately reflects my personal performance of the history, exam, review of systems, assessment, and plan. I have also personally directed, reviewed, and agreed with the chart.

## 2024-08-13 ENCOUNTER — TRANSCRIPTION ENCOUNTER (OUTPATIENT)
Age: 54
End: 2024-08-13

## 2024-08-14 ENCOUNTER — APPOINTMENT (OUTPATIENT)
Dept: PLASTIC SURGERY | Facility: HOSPITAL | Age: 54
End: 2024-08-14

## 2024-08-14 ENCOUNTER — APPOINTMENT (OUTPATIENT)
Dept: PULMONOLOGY | Facility: CLINIC | Age: 54
End: 2024-08-14
Payer: MEDICAID

## 2024-08-14 ENCOUNTER — RESULT REVIEW (OUTPATIENT)
Age: 54
End: 2024-08-14

## 2024-08-14 VITALS
RESPIRATION RATE: 16 BRPM | TEMPERATURE: 97.3 F | WEIGHT: 228 LBS | HEIGHT: 70 IN | BODY MASS INDEX: 32.64 KG/M2 | DIASTOLIC BLOOD PRESSURE: 78 MMHG | HEART RATE: 68 BPM | OXYGEN SATURATION: 93 % | SYSTOLIC BLOOD PRESSURE: 120 MMHG

## 2024-08-14 DIAGNOSIS — G47.33 OBSTRUCTIVE SLEEP APNEA (ADULT) (PEDIATRIC): ICD-10-CM

## 2024-08-14 PROCEDURE — G2211 COMPLEX E/M VISIT ADD ON: CPT | Mod: NC,1L

## 2024-08-14 PROCEDURE — 99214 OFFICE O/P EST MOD 30 MIN: CPT

## 2024-08-14 NOTE — ASSESSMENT
[FreeTextEntry1] : 54y/o male  1- sleepiness  with   6/24 moderate MERI 2- BMI   32 3- s/p   surgery   arm today  4- ct 2/2023   tracheal diverticulum  asymptomatic   stable  small nodule since 2018    low risk < 10 PPY   Recommendations  1- teaching   today MERI and   risks of untreated MERI  2- sleep hygiene and reviewed options treatment /    sleep on his side or two pillows  no long drives  3- agreement on  small mask only  cpap  ordered 4- ENT  per request upper airway inspection and  discussion on inspire   discussion teaching and agreement on plan

## 2024-08-14 NOTE — PHYSICAL EXAM
[Enlarged Base of the Tongue] : enlarged base of the tongue [IV] : Mallampati Class: IV [Normal Appearance] : normal appearance [Supple] : supple [No Neck Mass] : no neck mass [No JVD] : no jvd [Normal Rate/Rhythm] : normal rate/rhythm [Normal S1, S2] : normal s1, s2 [No Murmurs] : no murmurs [No Resp Distress] : no resp distress [No Acc Muscle Use] : no acc muscle use [Clear to Auscultation Bilaterally] : clear to auscultation bilaterally [Benign] : benign [Not Tender] : not tender [No Masses] : no masses [Soft] : soft [Normal Gait] : normal gait [No Clubbing] : no clubbing [No Cyanosis] : no cyanosis [No Edema] : no edema [No Rash] : no rash [No Motor Deficits] : no motor deficits [Normal Affect] : normal affect [TextBox_2] : pleasant male no distress no cough  [TextBox_11] : crowded   large tongue

## 2024-08-14 NOTE — HISTORY OF PRESENT ILLNESS
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

## 2024-08-14 NOTE — REVIEW OF SYSTEMS
[Fever] : no fever [Fatigue] : fatigue [Recent Wt Gain (___ Lbs)] : ~T no recent weight gain [Chills] : no chills [Recent Wt Loss (___ Lbs)] : ~T no recent weight loss [Dry Eyes] : no dry eyes [Nasal Congestion] : no nasal congestion [Sinus Problems] : no sinus problems [Cough] : no cough [Hemoptysis] : no hemoptysis [Sputum] : no sputum [Dyspnea] : no dyspnea [Wheezing] : no wheezing [Chest Discomfort] : no chest discomfort [Palpitations] : no palpitations

## 2024-08-14 NOTE — HISTORY OF PRESENT ILLNESS
[Former] : former [< 20 pack-years] : < 20 pack-years [Never] : never [Awakes with Dry Mouth] : awakes with dry mouth [Awakes with Headache] : awakes with headache [Daytime Somnolence] : daytime somnolence [Fatigue] : fatigue [Snoring] : snoring [TextBox_4] : 8/14/2024 52y/o  male bor n in 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

## 2024-08-23 ENCOUNTER — APPOINTMENT (OUTPATIENT)
Dept: PLASTIC SURGERY | Facility: CLINIC | Age: 54
End: 2024-08-23
Payer: MEDICAID

## 2024-08-23 VITALS
BODY MASS INDEX: 32.64 KG/M2 | HEART RATE: 87 BPM | WEIGHT: 228 LBS | HEIGHT: 70 IN | TEMPERATURE: 97.9 F | SYSTOLIC BLOOD PRESSURE: 126 MMHG | DIASTOLIC BLOOD PRESSURE: 74 MMHG | OXYGEN SATURATION: 95 %

## 2024-08-23 DIAGNOSIS — L90.5 SCAR CONDITIONS AND FIBROSIS OF SKIN: ICD-10-CM

## 2024-08-23 DIAGNOSIS — L91.0 HYPERTROPHIC SCAR: ICD-10-CM

## 2024-08-23 PROBLEM — G47.33 OBSTRUCTIVE SLEEP APNEA (ADULT) (PEDIATRIC): Chronic | Status: ACTIVE | Noted: 2024-08-12

## 2024-08-23 PROCEDURE — 99024 POSTOP FOLLOW-UP VISIT: CPT

## 2024-08-23 NOTE — SURGICAL HISTORY
[de-identified] : s/p revision of scar of arm. DOS 8/14/24.  [de-identified] : s/p reconstruction of right flank wound with local flap. DOS:5/8/24.

## 2024-08-23 NOTE — PHYSICAL EXAM
[TextEntry] : RUE: incision c/d/i, no open areas, no bleeding or drainage sutures patent, brown tape applied

## 2024-08-23 NOTE — DISCUSSION/SUMMARY
[TextEntry] : JUNI BONILLA is a 53 year old M who is s/p revision of scar of arm, possible local flap. DOS 8/14/24.  Sutures patent & intact, dresing removed, brown tape applied.  - OK to get area wet, pat dry, do not scrub - F/u 1 week for suture removal

## 2024-08-23 NOTE — HISTORY OF PRESENT ILLNESS
[FreeTextEntry1] : JUNI BONILLA is a 53 year old M who is s/p revision of scar of arm, possible local flap. DOS 8/14/24.  Patient is POD#9. This is his first post-op visit. Path reviewed.  Endorses itchiness to incision site, denies cp, sob, subjective fever, chills, and sweats.

## 2024-08-26 NOTE — ED ADULT NURSE NOTE - NS ED NURSE RECORD ANOTHER VITAL SIGN
"Outpatient Behavioral Health Psychotherapy Treatment Plan    Zulma Marcelo  1947     Date of Initial Psychotherapy Assessment: 2/12/2024   Date of Current Treatment Plan: 08/26/24  Treatment Plan Target Date: 2/12/2025  Treatment Plan Expiration Date: 2/12/2025    Diagnosis:   1. Recurrent major depressive disorder, in partial remission (HCC)            Area(s) of Need: Depression    Long Term Goal 1 (in the client's own words): \"Learn ways to cope\".    Stage of Change: Action    Target Date for completion: 2/12/2025     Anticipated therapeutic modalities: Psychoeducation, motivational interviewing, CBT, ACT, DBT, somatic exercises, mindfulness skills training, and supportive psychotherapy.      People identified to complete this goal: Zulma Marcelo (client) and Vannesa Stone (clinician)      Objective 1: (identify the means of measuring success in meeting the objective): Use session time to identify and explore positive supports to reduce daily overwhelm. REVIEWED 8/26/2024: Zulma has been working to build upon different coping skills such as coloring, decorating, decluttering, and working with her  to help sort through and organize financial matters. This is helping her greatly and is overall doing better. She would like to learn additional ways to cope however stating that she would like to expand on her tools.       Objective 2: (identify the means of measuring success in meeting the objective): Use session time to acknowledge and work through automatic negative thoughts which perpetuate feelings of worthlessness and hopelessness, and reframe/correct these thoughts with more positive and truthful depictions. REVIEWED 8/26/2024: Zulma has worked to identify automatic negative thoughts which perpetuate low self worth. She is able to clearly label them as they arise and is working on reframing how she sees herself through a more truthful and accepting lens. Continue goal.        I am currently under " "the care of a [facility] psychiatric provider: yes    My [facility] psychiatric provider is: Alis Kearns PA-C    I am currently taking psychiatric medications: Yes, as prescribed    I feel that I will be ready for discharge from mental health care when I reach the following (measurable goal/objective): \"I don't think I'll ever be. It's part of hat I need to do\".    For children and adults who have a legal guardian:   Has there been any change to custody orders and/or guardianship status? NA. If yes, attach updated documentation.    I have created my Crisis Plan and have been offered a copy of this plan    Behavioral Health Treatment Plan [facility]: Diagnosis and Treatment Plan explained to Zulma Marcelo acknowledges an understanding of their diagnosis. Zulma Marcelo agrees to this treatment plan.    I have been offered a copy of this Treatment Plan. yes        " Yes

## 2024-08-28 ENCOUNTER — APPOINTMENT (OUTPATIENT)
Dept: INTERNAL MEDICINE | Facility: CLINIC | Age: 54
End: 2024-08-28

## 2024-09-03 ENCOUNTER — EMERGENCY (EMERGENCY)
Facility: HOSPITAL | Age: 54
LOS: 1 days | Discharge: HOME | End: 2024-09-03
Attending: STUDENT IN AN ORGANIZED HEALTH CARE EDUCATION/TRAINING PROGRAM | Admitting: STUDENT IN AN ORGANIZED HEALTH CARE EDUCATION/TRAINING PROGRAM
Payer: MEDICAID

## 2024-09-03 ENCOUNTER — APPOINTMENT (OUTPATIENT)
Dept: FAMILY MEDICINE | Facility: CLINIC | Age: 54
End: 2024-09-03
Payer: MEDICAID

## 2024-09-03 VITALS
DIASTOLIC BLOOD PRESSURE: 86 MMHG | RESPIRATION RATE: 18 BRPM | OXYGEN SATURATION: 98 % | SYSTOLIC BLOOD PRESSURE: 145 MMHG | TEMPERATURE: 98 F | HEART RATE: 60 BPM

## 2024-09-03 VITALS
RESPIRATION RATE: 18 BRPM | HEIGHT: 70 IN | HEART RATE: 70 BPM | OXYGEN SATURATION: 99 % | WEIGHT: 227.96 LBS | TEMPERATURE: 99 F | SYSTOLIC BLOOD PRESSURE: 142 MMHG | DIASTOLIC BLOOD PRESSURE: 81 MMHG

## 2024-09-03 VITALS
HEART RATE: 80 BPM | DIASTOLIC BLOOD PRESSURE: 78 MMHG | HEIGHT: 70 IN | RESPIRATION RATE: 17 BRPM | OXYGEN SATURATION: 97 % | TEMPERATURE: 97.3 F | SYSTOLIC BLOOD PRESSURE: 150 MMHG | WEIGHT: 228 LBS | BODY MASS INDEX: 32.64 KG/M2

## 2024-09-03 DIAGNOSIS — M67.432 GANGLION, LEFT WRIST: Chronic | ICD-10-CM

## 2024-09-03 DIAGNOSIS — Z98.890 OTHER SPECIFIED POSTPROCEDURAL STATES: Chronic | ICD-10-CM

## 2024-09-03 DIAGNOSIS — G44.89 OTHER HEADACHE SYNDROME: ICD-10-CM

## 2024-09-03 LAB
ALBUMIN SERPL ELPH-MCNC: 4.3 G/DL — SIGNIFICANT CHANGE UP (ref 3.3–5)
ALP SERPL-CCNC: 61 U/L — SIGNIFICANT CHANGE UP (ref 30–120)
ALT FLD-CCNC: 71 U/L — HIGH (ref 10–60)
ANION GAP SERPL CALC-SCNC: 9 MMOL/L — SIGNIFICANT CHANGE UP (ref 5–17)
AST SERPL-CCNC: 40 U/L — SIGNIFICANT CHANGE UP (ref 10–40)
BASOPHILS # BLD AUTO: 0.04 K/UL — SIGNIFICANT CHANGE UP (ref 0–0.2)
BASOPHILS NFR BLD AUTO: 0.6 % — SIGNIFICANT CHANGE UP (ref 0–2)
BILIRUB SERPL-MCNC: 0.4 MG/DL — SIGNIFICANT CHANGE UP (ref 0.2–1.2)
BUN SERPL-MCNC: 15 MG/DL — SIGNIFICANT CHANGE UP (ref 7–23)
CALCIUM SERPL-MCNC: 9.3 MG/DL — SIGNIFICANT CHANGE UP (ref 8.4–10.5)
CHLORIDE SERPL-SCNC: 101 MMOL/L — SIGNIFICANT CHANGE UP (ref 96–108)
CO2 SERPL-SCNC: 27 MMOL/L — SIGNIFICANT CHANGE UP (ref 22–31)
CREAT SERPL-MCNC: 0.95 MG/DL — SIGNIFICANT CHANGE UP (ref 0.5–1.3)
EGFR: 96 ML/MIN/1.73M2 — SIGNIFICANT CHANGE UP
EOSINOPHIL # BLD AUTO: 0.09 K/UL — SIGNIFICANT CHANGE UP (ref 0–0.5)
EOSINOPHIL NFR BLD AUTO: 1.5 % — SIGNIFICANT CHANGE UP (ref 0–6)
GLUCOSE SERPL-MCNC: 97 MG/DL — SIGNIFICANT CHANGE UP (ref 70–99)
HCT VFR BLD CALC: 43.1 % — SIGNIFICANT CHANGE UP (ref 39–50)
HGB BLD-MCNC: 14.8 G/DL — SIGNIFICANT CHANGE UP (ref 13–17)
IMM GRANULOCYTES NFR BLD AUTO: 0.2 % — SIGNIFICANT CHANGE UP (ref 0–0.9)
LYMPHOCYTES # BLD AUTO: 2.53 K/UL — SIGNIFICANT CHANGE UP (ref 1–3.3)
LYMPHOCYTES # BLD AUTO: 40.9 % — SIGNIFICANT CHANGE UP (ref 13–44)
MCHC RBC-ENTMCNC: 30.1 PG — SIGNIFICANT CHANGE UP (ref 27–34)
MCHC RBC-ENTMCNC: 34.3 GM/DL — SIGNIFICANT CHANGE UP (ref 32–36)
MCV RBC AUTO: 87.6 FL — SIGNIFICANT CHANGE UP (ref 80–100)
MONOCYTES # BLD AUTO: 0.47 K/UL — SIGNIFICANT CHANGE UP (ref 0–0.9)
MONOCYTES NFR BLD AUTO: 7.6 % — SIGNIFICANT CHANGE UP (ref 2–14)
NEUTROPHILS # BLD AUTO: 3.05 K/UL — SIGNIFICANT CHANGE UP (ref 1.8–7.4)
NEUTROPHILS NFR BLD AUTO: 49.2 % — SIGNIFICANT CHANGE UP (ref 43–77)
NRBC # BLD: 0 /100 WBCS — SIGNIFICANT CHANGE UP (ref 0–0)
PLATELET # BLD AUTO: 328 K/UL — SIGNIFICANT CHANGE UP (ref 150–400)
POTASSIUM SERPL-MCNC: 4 MMOL/L — SIGNIFICANT CHANGE UP (ref 3.5–5.3)
POTASSIUM SERPL-SCNC: 4 MMOL/L — SIGNIFICANT CHANGE UP (ref 3.5–5.3)
PROT SERPL-MCNC: 7.4 G/DL — SIGNIFICANT CHANGE UP (ref 6–8.3)
RBC # BLD: 4.92 M/UL — SIGNIFICANT CHANGE UP (ref 4.2–5.8)
RBC # FLD: 12.1 % — SIGNIFICANT CHANGE UP (ref 10.3–14.5)
SODIUM SERPL-SCNC: 137 MMOL/L — SIGNIFICANT CHANGE UP (ref 135–145)
WBC # BLD: 6.19 K/UL — SIGNIFICANT CHANGE UP (ref 3.8–10.5)
WBC # FLD AUTO: 6.19 K/UL — SIGNIFICANT CHANGE UP (ref 3.8–10.5)

## 2024-09-03 PROCEDURE — 36415 COLL VENOUS BLD VENIPUNCTURE: CPT

## 2024-09-03 PROCEDURE — 70450 CT HEAD/BRAIN W/O DYE: CPT | Mod: 26,MC

## 2024-09-03 PROCEDURE — 99214 OFFICE O/P EST MOD 30 MIN: CPT

## 2024-09-03 PROCEDURE — 80053 COMPREHEN METABOLIC PANEL: CPT

## 2024-09-03 PROCEDURE — 96374 THER/PROPH/DIAG INJ IV PUSH: CPT

## 2024-09-03 PROCEDURE — 99284 EMERGENCY DEPT VISIT MOD MDM: CPT | Mod: 25

## 2024-09-03 PROCEDURE — 96375 TX/PRO/DX INJ NEW DRUG ADDON: CPT

## 2024-09-03 PROCEDURE — 70450 CT HEAD/BRAIN W/O DYE: CPT | Mod: MC

## 2024-09-03 PROCEDURE — 99284 EMERGENCY DEPT VISIT MOD MDM: CPT

## 2024-09-03 PROCEDURE — 85025 COMPLETE CBC W/AUTO DIFF WBC: CPT

## 2024-09-03 RX ORDER — METOCLOPRAMIDE HCL 5 MG
10 TABLET ORAL ONCE
Refills: 0 | Status: COMPLETED | OUTPATIENT
Start: 2024-09-03 | End: 2024-09-03

## 2024-09-03 RX ORDER — LIDOCAINE/BENZALKONIUM/ALCOHOL
1 SOLUTION, NON-ORAL TOPICAL ONCE
Refills: 0 | Status: COMPLETED | OUTPATIENT
Start: 2024-09-03 | End: 2024-09-03

## 2024-09-03 RX ORDER — SODIUM CHLORIDE 9 MG/ML
1000 INJECTION INTRAMUSCULAR; INTRAVENOUS; SUBCUTANEOUS ONCE
Refills: 0 | Status: COMPLETED | OUTPATIENT
Start: 2024-09-03 | End: 2024-09-03

## 2024-09-03 RX ORDER — KETOROLAC TROMETHAMINE 30 MG/ML
15 INJECTION, SOLUTION INTRAMUSCULAR ONCE
Refills: 0 | Status: DISCONTINUED | OUTPATIENT
Start: 2024-09-03 | End: 2024-09-03

## 2024-09-03 RX ADMIN — KETOROLAC TROMETHAMINE 15 MILLIGRAM(S): 30 INJECTION, SOLUTION INTRAMUSCULAR at 18:29

## 2024-09-03 RX ADMIN — KETOROLAC TROMETHAMINE 15 MILLIGRAM(S): 30 INJECTION, SOLUTION INTRAMUSCULAR at 18:00

## 2024-09-03 RX ADMIN — Medication 1 PATCH: at 17:37

## 2024-09-03 RX ADMIN — SODIUM CHLORIDE 1000 MILLILITER(S): 9 INJECTION INTRAMUSCULAR; INTRAVENOUS; SUBCUTANEOUS at 18:00

## 2024-09-03 RX ADMIN — Medication 10 MILLIGRAM(S): at 17:37

## 2024-09-03 NOTE — ED PROVIDER NOTE - NSICDXPASTMEDICALHX_GEN_ALL_CORE_FT
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

## 2024-09-03 NOTE — ED PROVIDER NOTE - CLINICAL SUMMARY MEDICAL DECISION MAKING FREE TEXT BOX
I, Kris Barboza MD, have seen and examined the patient on the date of service.  I agree with the JEFF's assessment as written, with exceptions or additions as noted below or in a separate note.  Patient with no significant past medical history is presenting with headaches.  States he has been having right-sided headache for the past couple weeks.  Also states that he will feel some tightness in the right posterior portion of his head and back of his neck.  Pain is worse with some movements.  Denies any vision changes.  No fevers.  States that his son was recently diagnosed with brain cancer and that has made him very stressed.  Saw the PCP today who referred him to ED for evaluation.  Patient with no focal neurologic deficits on exam.  NIH stroke scale 0.  Suspect likely tension headache versus migraine.  However with family history and longevity of symptoms, will check CT head to evaluate for intracranial pathology such as mass.  Will treat symptomatically.

## 2024-09-03 NOTE — ED PROVIDER NOTE - DIFFERENTIAL DIAGNOSIS
Differential Diagnosis Differentials include but not limited to migraine headache, tension headache, mass

## 2024-09-03 NOTE — ED ADULT TRIAGE NOTE - CHIEF COMPLAINT QUOTE
pt states that he has been having intermittent headaches for 2-3 weeks. Pt states that today the headache got worse. Pt denies numbness, tingling, weakness on one side. Pt does not have a facial droop. Pt sent by pmd.

## 2024-09-03 NOTE — ED PROVIDER NOTE - PATIENT PORTAL LINK FT
You can access the FollowMyHealth Patient Portal offered by [facility] by registering at the following website: [email or web address]. By joining [employer]’s FollowMyHealth portal, you will also be able to view your health information using other applications (apps) compatible with our system.

## 2024-09-03 NOTE — ED PROVIDER NOTE - OBJECTIVE STATEMENT
53-year-old male with history of obesity and sleep apnea presents with intermittent right-sided headache the past 2 to 3 weeks.  States headache will be on and off.  Occasional dizziness when pain is severe.  Denies any current dizziness.  Denies any spinning sensation.  Denies any unsteady gait.  Denies any vision changes, slurred speech, confusion, one-sided weakness.  No chest pain or shortness of breath.  Does report increased rest recently.  Also reports his son recently diagnosed with brain cancer.  Was seen by primary care doctor today and sent to emergency room for further evaluation. Denies fever or recent illnesses   NENO Harris

## 2024-09-03 NOTE — ED ADULT NURSE NOTE - CAS TRG GEN SKIN COLOR
81-year-old male with history of CAD, CABG, hypertension, hyperlipidemia, diabetes, BPH, AICD sent to ED by his primary care doctor for abdominal pain for the past 2 days, worse since 3 AM this morning.  Pain is constant, pressure-like, diffuse abdomen, nonradiating.  Denies fever, chills, chest pain, shortness of breath, nausea, vomiting, diarrhea, urinary symptoms, flank pain.  Patient states pain was significantly worse at 3 AM this morning.  Patient took Tylenol, Pepcid and Mylanta with improvement of pain.  No history of previous abdominal surgeries    pmd: Dr. Markell Cross
Normal for race

## 2024-09-03 NOTE — ED ADULT NURSE REASSESSMENT NOTE - NS ED NURSE REASSESS COMMENT FT1
patient A&Ox3 in no acute distress no headache no dizziness at this time , discharge as orders heplock remove left ER self ambulated

## 2024-09-03 NOTE — ED ADULT NURSE NOTE - OBJECTIVE STATEMENT
patient A&Ox3 in no acute distress c/o of headache for 3 weeks with dizziness " sometimes" , denied blurred vision no N/V , moving all extremities no facial droop clear speech at this time no chest pain no difficulty breathing

## 2024-09-03 NOTE — ED PROVIDER NOTE - NSFOLLOWUPINSTRUCTIONS_ED_ALL_ED_FT
General Headache Without Cause    A headache is pain or discomfort felt around the head or neck area. The specific cause of a headache may not be found. There are many causes and types of headaches. A few common ones are:  •Tension headaches.  •Migraine headaches.  •Cluster headaches.  •Chronic daily headaches.    Follow these instructions at home:    Watch your condition for any changes. Let your health care provider know about them. Take these steps to help with your condition:    Managing pain   •Take over-the-counter and prescription medicines only as told by your health care provider.  •Lie down in a dark, quiet room when you have a headache.  •If directed, put ice on your head and neck area:  •Put ice in a plastic bag.  •Place a towel between your skin and the bag.  •Leave the ice on for 20 minutes, 2–3 times per day.  •If directed, apply heat to the affected area. Use the heat source that your health care provider recommends, such as a moist heat pack or a heating pad.  •Place a towel between your skin and the heat source.  •Leave the heat on for 20–30 minutes.  •Remove the heat if your skin turns bright red. This is especially important if you are unable to feel pain, heat, or cold. You may have a greater risk of getting burned.  •Keep lights dim if bright lights bother you or make your headaches worse.    Eating and drinking   •Eat meals on a regular schedule.  •If you drink alcohol:•Limit how much you use to:   •0–1 drink a day for women.   • 0–2 drinks a day for men.   •Be aware of how much alcohol is in your drink. In the [place] one drink equals one 12 oz bottle of beer (355 mL), one 5 oz glass of wine (148 mL), or one 1½ oz glass of hard liquor (44 mL).  •Stop drinking caffeine, or decrease the amount of caffeine you drink.    General instructions    •Keep a headache journal to help find out what may trigger your headaches. For example, write down:  •What you eat and drink.  •How much sleep you get.  •Any change to your diet or medicines.  •Try massage or other relaxation techniques.  •Limit stress.  •Sit up straight, and do not tense your muscles.  • Do not use any products that contain nicotine or tobacco, such as cigarettes, e-cigarettes, and chewing tobacco. If you need help quitting, ask your health care provider.  •Exercise regularly as told by your health care provider.  •Sleep on a regular schedule. Get 7–9 hours of sleep each night, or the amount recommended by your health care provider.  •Keep all follow-up visits as told by your health care provider. This is important.    Contact a health care provider if:  •Your symptoms are not helped by medicine.  •You have a headache that is different from the usual headache.  •You have nausea or you vomit.  •You have a fever.    Get help right away if:  •Your headache becomes severe quickly.  •Your headache gets worse after moderate to intense physical activity.  •You have repeated vomiting.  •You have a stiff neck.  •You have a loss of vision.  •You have problems with speech.  •You have pain in the eye or ear.  •You have muscular weakness or loss of muscle control.  •You lose your balance or have trouble walking.  •You feel faint or pass out.  •You have confusion.  •You have a seizure.    Summary  •A headache is pain or discomfort felt around the head or neck area.  •There are many causes and types of headaches. In some cases, the cause may not be found.  •Keep a headache journal to help find out what may trigger your headaches. Watch your condition for any changes. Let your health care provider know about them.  •Contact a health care provider if you have a headache that is different from the usual headache, or if your symptoms are not helped by medicine.  •Get help right away if your headache becomes severe, you vomit, you have a loss of vision, you lose your balance, or you have a seizure.    This information is not intended to replace advice given to you by your health care provider. Make sure you discuss any questions you have with your health care provider.    Document Revised: 07/08/2019 Document Reviewed: 07/08/2019    Elsevier Patient Education © 2022 [employer]

## 2024-09-03 NOTE — ED PROVIDER NOTE - CARE PROVIDER_API CALL
Alban Harris NP in Family Health  [address] [place] [id number]  Phone: [phone]  Fax: [phone]  Follow Up Time: 4-6 Days

## 2024-09-03 NOTE — ED PROVIDER NOTE - SKIN NEGATIVE STATEMENT, MLM
Alert-The patient is alert, awake and responds to voice. The patient is oriented to time, place, and person. The triage nurse is able to obtain subjective information. no rashes.

## 2024-09-03 NOTE — ED PROVIDER NOTE - PROGRESS NOTE DETAILS
resting comfortably. CT negative. toradol given. will reassess. Dr. Barboza will assume care Patient reevaluated, feeling better at this time after medications.  CT negative for acute pathology.  Patient feels comfortable with discharge.  Advising outpatient follow-up with the PCP.  Plan to discharge patient. Return to ED precautions were discussed with the patient/family. All questions were answered. Kris Barboza MD.

## 2024-09-05 ENCOUNTER — APPOINTMENT (OUTPATIENT)
Dept: PLASTIC SURGERY | Facility: CLINIC | Age: 54
End: 2024-09-05

## 2024-09-05 VITALS
RESPIRATION RATE: 16 BRPM | HEART RATE: 83 BPM | TEMPERATURE: 97.8 F | WEIGHT: 228 LBS | DIASTOLIC BLOOD PRESSURE: 82 MMHG | SYSTOLIC BLOOD PRESSURE: 124 MMHG | BODY MASS INDEX: 32.64 KG/M2 | OXYGEN SATURATION: 93 % | HEIGHT: 70 IN

## 2024-09-05 PROCEDURE — 99024 POSTOP FOLLOW-UP VISIT: CPT

## 2024-09-11 NOTE — ASSESSMENT
[FreeTextEntry1] : Concern for potentially red flag symptoms of headache that is worse with movement and has been relatively intractable since this morning. Recommendation is for patient to report to ED for urgent imaging of the head to rule out any acute pathology Discussed differential which could include tension headaches, sinus infection, versus TIA versus stroke Spent 30 minutes discussing and assessing patient's headache.  Discussing further workup and using shared decision model and deciding to send patient to ED.

## 2024-09-11 NOTE — HISTORY OF PRESENT ILLNESS
[FreeTextEntry8] : Presents with constant right sided headache that is worse with coughing, or when he has to lean foward to grab an obect.  Woke up with headache around 5 30 am.  overall headache has waxed and waned.  Has taken two advil with minimal rlief.  Feels fatigued.  Denies any visual changes, nausea or fever No fever, chills,

## 2024-09-16 ENCOUNTER — APPOINTMENT (OUTPATIENT)
Dept: DERMATOLOGY | Facility: CLINIC | Age: 54
End: 2024-09-16

## 2024-09-24 NOTE — ASU PREOP CHECKLIST - LATEX ALLERGY
"Daily Note     Today's date: 2024  Patient name: Ning Chambers  : 1938  MRN: [id number]  Referring provider: Stephen Ramirez MD  Dx:   Encounter Diagnosis     ICD-10-CM    1. Ambulatory dysfunction  R26.2       2. Bilateral leg pain  M79.604     M79.605                      Subjective: pt reports no pain on arrival to session and her pain was good all weekend.       Objective: See treatment diary below      Assessment: Tolerated treatment well. Pt has no noted increased pain during or post session. VC for proper reps at times. Patient would benefit from continued PT      Plan: Continue per plan of care.      Precautions: medical tape allergy, NEPHROSTOMY TUBES   Past Medical History:   Diagnosis Date    Arthritis     right knee    Hay fever     Hyperlipidemia     Hypertension     Myocardial infarction (HCC)     questionable MI during exploratory surgery    Ovarian cancer (HCC)     surgery (did not have chemo or radiation)    Stroke (HCC)     \" mini stroke \" no deficits ; another poss stroke with  left hand weakness    Wears glasses     Wears partial dentures     upper & lower     SOC: 2024  FOTO: 2024  POC Expiration: 2024  Daily Treatment Log:  Date 2024   Visit # 11  8 9  10 (RE/FOTO)    Auth         Auth exp        Manual                        There Exer 30'   30' 30'  30'   Lumbar pball stretch  5\"x20  5\"x20 5\"x20  5\" x20   Seated AROM hip IR/ER  AROM hip IR/ER 1x10 R/L w/ ball   AROM hip IR/ER 1x10 R/L  AROM hip IR/ER 1x10 R/L   AROM hip IR/ER 1x10 R/L     Seated HS stretch    10\"x5 R/L  10\"x5 R/L     Std hip ext 2x10 R/L alt    4x5 R/L alt  2x10 R/L  2x10 R/L    Std hip abd  2x10 R/L alt   4x5 R/L alt  2x10 R/L  2x10 R/L    LAQ  1.5# 2x10 R/L   4x5 R/L 1.5 lbs 1.5# 2x10 R/L  1.5# 2x10 R/L    Seated HS curls  RTB 2x10 R/L   RTB 4x5 R/L RTB  2x10 R/L  RTB 2x10    STS elevated mat  1x10;2x  2x10 2x10     Seated knee flexion with opp " "LE OP         Knee flexion stretch on step      6\" step 5\" 1x10    Objective measures      EG   HEP        There Activ                                                        NMReed 10'   10' 10'  15'   Step taps      6\" step 1x10 R/L alt    FTEO on foam    Tandem 30\" x 2     FSU   6\" step 1x10 R/L  4\" step 2x5 R/L  6\" step 1x10    Marching at rail 1 UE support  1x10 R/L   1x10 R/L  1x10 R/L     Side stepping    4 laps     Monster walks         RFISit  1x15 to end   1x10 to start and end  1x15 to end  1x10 to start and end    HR     1x10            Modalities        NO STIM - ACTIVE CANCER AND NEPHROSTOMY TUBES                         HEP:   Access Code: [email or web address]  URL: [email or web address]  Date: 09/09/2024  Prepared by: Zainab Garzon    Exercises  - Seated Lumbar Flexion Stretch  - 5 x daily - 7 x weekly - 1 sets - 10 reps  - Seated Hip External Rotation AROM  - 1 x daily - 7 x weekly - 2 sets - 5 reps  - Seated Long Arc Quad  - 1 x daily - 7 x weekly - 2 sets - 5 reps  - Seated Knee Flexion AAROM  - 1 x daily - 7 x weekly - 1 sets - 10 reps - 5 sec hold  - Standing Hip Abduction with Counter Support  - 1 x daily - 7 x weekly - 2 sets - 5 reps  - Standing Hip Extension with Counter Support  - 1 x daily - 7 x weekly - 2 sets - 5 reps       " no

## 2024-11-11 ENCOUNTER — NON-APPOINTMENT (OUTPATIENT)
Age: 54
End: 2024-11-11

## 2024-11-22 ENCOUNTER — APPOINTMENT (OUTPATIENT)
Dept: PULMONOLOGY | Facility: CLINIC | Age: 54
End: 2024-11-22

## 2024-12-02 ENCOUNTER — APPOINTMENT (OUTPATIENT)
Dept: OTOLARYNGOLOGY | Facility: CLINIC | Age: 54
End: 2024-12-02

## 2025-02-18 ENCOUNTER — APPOINTMENT (OUTPATIENT)
Dept: FAMILY MEDICINE | Facility: CLINIC | Age: 55
End: 2025-02-18
Payer: MEDICAID

## 2025-02-18 VITALS
RESPIRATION RATE: 16 BRPM | OXYGEN SATURATION: 99 % | BODY MASS INDEX: 31.5 KG/M2 | DIASTOLIC BLOOD PRESSURE: 88 MMHG | WEIGHT: 220 LBS | HEIGHT: 70 IN | HEART RATE: 69 BPM | SYSTOLIC BLOOD PRESSURE: 128 MMHG

## 2025-02-18 DIAGNOSIS — E66.01 MORBID (SEVERE) OBESITY DUE TO EXCESS CALORIES: ICD-10-CM

## 2025-02-18 DIAGNOSIS — R10.11 RIGHT UPPER QUADRANT PAIN: ICD-10-CM

## 2025-02-18 DIAGNOSIS — R73.03 PREDIABETES.: ICD-10-CM

## 2025-02-18 DIAGNOSIS — K21.9 GASTRO-ESOPHAGEAL REFLUX DISEASE W/OUT ESOPHAGITIS: ICD-10-CM

## 2025-02-18 PROCEDURE — 99214 OFFICE O/P EST MOD 30 MIN: CPT

## 2025-02-18 PROCEDURE — G2211 COMPLEX E/M VISIT ADD ON: CPT | Mod: NC

## 2025-02-21 DIAGNOSIS — E55.9 VITAMIN D DEFICIENCY, UNSPECIFIED: ICD-10-CM

## 2025-02-21 LAB
25(OH)D3 SERPL-MCNC: 15.6 NG/ML
ALBUMIN SERPL ELPH-MCNC: 4.6 G/DL
ALP BLD-CCNC: 76 U/L
ALT SERPL-CCNC: 69 U/L
AMYLASE/CREAT SERPL: 72 U/L
ANION GAP SERPL CALC-SCNC: 11 MMOL/L
APPEARANCE: CLEAR
AST SERPL-CCNC: 40 U/L
BASOPHILS # BLD AUTO: 0.02 K/UL
BASOPHILS NFR BLD AUTO: 0.3 %
BILIRUB SERPL-MCNC: 0.5 MG/DL
BILIRUBIN URINE: NEGATIVE
BLOOD URINE: NEGATIVE
BUN SERPL-MCNC: 16 MG/DL
CALCIUM SERPL-MCNC: 9.7 MG/DL
CHLORIDE SERPL-SCNC: 102 MMOL/L
CHOLEST SERPL-MCNC: 243 MG/DL
CO2 SERPL-SCNC: 27 MMOL/L
COLOR: YELLOW
CREAT SERPL-MCNC: 0.86 MG/DL
EGFR: 104 ML/MIN/1.73M2
EOSINOPHIL # BLD AUTO: 0.09 K/UL
EOSINOPHIL NFR BLD AUTO: 1.4 %
ESTIMATED AVERAGE GLUCOSE: 126 MG/DL
FOLATE SERPL-MCNC: 7.2 NG/ML
GLUCOSE QUALITATIVE U: NEGATIVE MG/DL
GLUCOSE SERPL-MCNC: 113 MG/DL
HBA1C MFR BLD HPLC: 6 %
HCT VFR BLD CALC: 47 %
HDLC SERPL-MCNC: 50 MG/DL
HGB BLD-MCNC: 16.1 G/DL
IMM GRANULOCYTES NFR BLD AUTO: 0.2 %
KETONES URINE: NEGATIVE MG/DL
LDLC SERPL CALC-MCNC: 159 MG/DL
LEUKOCYTE ESTERASE URINE: NEGATIVE
LPL SERPL-CCNC: 21 U/L
LYMPHOCYTES # BLD AUTO: 2.91 K/UL
LYMPHOCYTES NFR BLD AUTO: 43.7 %
MAN DIFF?: NORMAL
MCHC RBC-ENTMCNC: 30 PG
MCHC RBC-ENTMCNC: 34.3 G/DL
MCV RBC AUTO: 87.5 FL
MONOCYTES # BLD AUTO: 0.54 K/UL
MONOCYTES NFR BLD AUTO: 8.1 %
NEUTROPHILS # BLD AUTO: 3.09 K/UL
NEUTROPHILS NFR BLD AUTO: 46.3 %
NITRITE URINE: NEGATIVE
NONHDLC SERPL-MCNC: 194 MG/DL
PH URINE: 6.5
PLATELET # BLD AUTO: 316 K/UL
POTASSIUM SERPL-SCNC: 4.9 MMOL/L
PROT SERPL-MCNC: 7.2 G/DL
PROTEIN URINE: NEGATIVE MG/DL
PSA SERPL-MCNC: 2.13 NG/ML
RBC # BLD: 5.37 M/UL
RBC # FLD: 12.3 %
SODIUM SERPL-SCNC: 141 MMOL/L
SPECIFIC GRAVITY URINE: 1.02
TRIGL SERPL-MCNC: 187 MG/DL
TSH SERPL-ACNC: 1.28 UIU/ML
UROBILINOGEN URINE: 0.2 MG/DL
VIT B12 SERPL-MCNC: 507 PG/ML
WBC # FLD AUTO: 6.66 K/UL

## 2025-02-21 RX ORDER — CHOLECALCIFEROL (VITAMIN D3) 1250 MCG
1.25 MG CAPSULE ORAL
Qty: 12 | Refills: 2 | Status: ACTIVE | COMMUNITY
Start: 2025-02-21 | End: 1900-01-01

## 2025-02-25 ENCOUNTER — APPOINTMENT (OUTPATIENT)
Facility: CLINIC | Age: 55
End: 2025-02-25
Payer: MEDICAID

## 2025-02-25 VITALS
WEIGHT: 220 LBS | HEART RATE: 85 BPM | RESPIRATION RATE: 16 BRPM | TEMPERATURE: 97.4 F | HEIGHT: 70 IN | SYSTOLIC BLOOD PRESSURE: 136 MMHG | DIASTOLIC BLOOD PRESSURE: 80 MMHG | BODY MASS INDEX: 31.5 KG/M2 | OXYGEN SATURATION: 98 %

## 2025-02-25 PROCEDURE — 99204 OFFICE O/P NEW MOD 45 MIN: CPT

## 2025-03-02 PROBLEM — R10.9 ABDOMINAL DISCOMFORT: Status: ACTIVE | Noted: 2025-02-18

## 2025-03-10 NOTE — HISTORY OF PRESENT ILLNESS
Left message on mailbox for patient to call nurse care manager back for transitions of care call.  Nurse care  information [phone] included in message.        Disposition and Plan      Clinical Impression:  1. Leg swelling      Follow-up Appointments:    Call to make an appointment as soon as possible with Ortho and your primary care doctor. Continue to take Tylenol or ibuprofen for pain as needed. Continue using compression stocking and elevate leg is much as possible while at rest to help with swelling. Return to the ER for any other new or worsening symptoms.    Follow-up:  Houston Chau MD  [address] [address]  [phone]    Future Appointments   Date Time Provider Department Center   4/6/2025  1:00 PM [facility] [place]   4/15/2025  2:45 PM Debbie Chang MD [facility]          [de-identified] : ***Originally REFERRED [facility]\par \par \par 51 year-old gentleman.\par \par August 2022:\par Resection of a symptomatic soft tissue mass from the mid back.\par Plastics: Dr. 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, clinically completely excised.\par \par \par Patient NOW feels a chest lump.\par He reports the mass is located in the medial right chest and he has been aware of it for many years.\par It has been stable.\par It is asymptomatic.\par There was no antecedent trauma or strain.\par \par \par No other specific or constitutional signs or symptoms.\par \par When seen January 2021, he was doing well.\par \par CC:\par 2-week history of a tender lump of the right lower back.\par No preceding injury.\par No other specific or constitutional signs or symptoms.\par April 2021 he presented with with a tender mass of the upper RIGHT ARM.\par He had originally shown this area to me in May 2019.\par At that time it was ~2 cm diameter, subcutaneous in location, smooth, mobile, and nontender.\par April 2021 sonography did not suggest any significant change.\par Although I offered expectant management, he chose excision.\par \par May 2021:\par Excision of a 2.5 cm lipoma, and 3.5 cm angiolipoma, both from the proximal, posterior, right arm.\par Plastics: Dr. Jensen Nguyen.\par \par \par January 2022:\par He was asymptomatic with regards to the nodule on the right parieto-occipital scalp, the surgical site on the posterior upper right arm, medial right ankle, and posterior left thigh.\par He had no new complaints.\par \par \par + Known subcutaneous nodule of the right parietal/occipital scalp.\par \par \par + history of a schwannoma of his posterior LEFT THIGH \par \par \par February 2017:\par Resection of a 6 cm schwannoma from the posterior LEFT THIGH, with neurosurgery (Dr. George HERNANDES), and reconstruction by Dr. Jensen Nguyen.\par Postoperative course was prolonged by severe constipation from overuse of narcotic analgesics, requiring readmission.\par \par Eventually, recuperated completely and has been followed carefully clinically.\par \par \par November 2016 presented to general surgery clinic with a 7-8 year history of an enlarging mass in the posterior left thigh.\par \par Summer 2020 episode of shingles.\par \par No personal history of malignancy.\par \par + FH:\par Spring 2020, son in [place] dx'd and being treated for breast cancer.\par \par \par ~January 2018 (cannot specify duration) he noticed a nodule on the inner aspect of his right leg. 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 Nguyen) demonstrated dermatofibroma, with microscopic involvement of the margins.\par August 2018 reexcision (By me, with reconstruction by Dr. Nguyen) obtained negative margins.\par \par February 2021:\par Excision of a left wrist mass by Dr. Nguyen.\par Pathology: Mature adipose tissue and fibrous connective tissue with mild edema\par \par July 2019 he had excision of a left wrist ganglion cyst by Dr. Jensen Nguyen.\par \par \par His internist is Dr. Slava THOMPSON.\par \par He has no significant past medical history.\par \par He takes no regular medications\par \par \par Colonoscopy at age 46 okay x10 years.

## 2025-03-11 ENCOUNTER — APPOINTMENT (OUTPATIENT)
Dept: FAMILY MEDICINE | Facility: CLINIC | Age: 55
End: 2025-03-11
Payer: MEDICAID

## 2025-03-11 VITALS
TEMPERATURE: 97.2 F | BODY MASS INDEX: 32.21 KG/M2 | DIASTOLIC BLOOD PRESSURE: 82 MMHG | SYSTOLIC BLOOD PRESSURE: 131 MMHG | HEART RATE: 71 BPM | OXYGEN SATURATION: 95 % | HEIGHT: 70 IN | WEIGHT: 225 LBS | RESPIRATION RATE: 16 BRPM

## 2025-03-11 DIAGNOSIS — M25.529 PAIN IN UNSPECIFIED ELBOW: ICD-10-CM

## 2025-03-11 DIAGNOSIS — R53.82 CHRONIC FATIGUE, UNSPECIFIED: ICD-10-CM

## 2025-03-11 DIAGNOSIS — M77.8 OTHER ENTHESOPATHIES, NOT ELSEWHERE CLASSIFIED: ICD-10-CM

## 2025-03-11 DIAGNOSIS — E55.9 VITAMIN D DEFICIENCY, UNSPECIFIED: ICD-10-CM

## 2025-03-11 PROCEDURE — 96372 THER/PROPH/DIAG INJ SC/IM: CPT

## 2025-03-11 PROCEDURE — 99214 OFFICE O/P EST MOD 30 MIN: CPT | Mod: 25

## 2025-03-11 RX ORDER — METHYLPRED ACET/NACL,ISO-OS/PF 40 MG/ML
40 VIAL (ML) INJECTION
Refills: 0 | Status: COMPLETED | OUTPATIENT
Start: 2025-03-11

## 2025-03-11 RX ORDER — METHYLPREDNISOLONE 4 MG/1
4 TABLET ORAL
Qty: 1 | Refills: 0 | Status: ACTIVE | COMMUNITY
Start: 2025-03-11 | End: 1900-01-01

## 2025-03-11 RX ADMIN — Medication 0 MG/ML: at 00:00

## 2025-03-16 ENCOUNTER — OUTPATIENT (OUTPATIENT)
Dept: OUTPATIENT SERVICES | Facility: HOSPITAL | Age: 55
LOS: 1 days | End: 2025-03-16
Payer: MEDICAID

## 2025-03-16 DIAGNOSIS — Z98.890 OTHER SPECIFIED POSTPROCEDURAL STATES: Chronic | ICD-10-CM

## 2025-03-16 DIAGNOSIS — M67.432 GANGLION, LEFT WRIST: Chronic | ICD-10-CM

## 2025-03-16 DIAGNOSIS — R10.9 UNSPECIFIED ABDOMINAL PAIN: ICD-10-CM

## 2025-03-16 PROCEDURE — 76700 US EXAM ABDOM COMPLETE: CPT

## 2025-03-25 ENCOUNTER — NON-APPOINTMENT (OUTPATIENT)
Age: 55
End: 2025-03-25

## 2025-03-25 ENCOUNTER — APPOINTMENT (OUTPATIENT)
Dept: ORTHOPEDIC SURGERY | Facility: CLINIC | Age: 55
End: 2025-03-25
Payer: MEDICAID

## 2025-03-25 VITALS — WEIGHT: 225 LBS | BODY MASS INDEX: 32.21 KG/M2 | HEIGHT: 70 IN

## 2025-03-25 DIAGNOSIS — M77.10 LATERAL EPICONDYLITIS, UNSPECIFIED ELBOW: ICD-10-CM

## 2025-03-25 PROCEDURE — 99213 OFFICE O/P EST LOW 20 MIN: CPT

## 2025-04-01 NOTE — ED ADULT NURSE NOTE - NSICDXPASTMEDICALHX_GEN_ALL_CORE_FT
Detail Level: Detailed Quality 226: Preventive Care And Screening: Tobacco Use: Screening And Cessation Intervention: Tobacco Screening not Performed Quality 130: Documentation Of Current Medications In The Medical Record: Current Medications Documented Quality 431: Preventive Care And Screening: Unhealthy Alcohol Use - Screening: Patient not identified as an unhealthy alcohol user when screened for unhealthy alcohol use using a systematic screening method PAST MEDICAL HISTORY:  Anorectal abscess     Fatty liver     Ganglion cyst of wrist     History of 2019 novel coronavirus disease (COVID-19) 5/2022 - home test only-mild case    History of Clostridium difficile colitis 2012    Mass of arm, right x2 - right upper arm    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

## 2025-04-08 ENCOUNTER — APPOINTMENT (OUTPATIENT)
Facility: CLINIC | Age: 55
End: 2025-04-08
Payer: MEDICAID

## 2025-04-08 VITALS
HEART RATE: 63 BPM | TEMPERATURE: 97.6 F | BODY MASS INDEX: 32.07 KG/M2 | RESPIRATION RATE: 14 BRPM | HEIGHT: 70 IN | SYSTOLIC BLOOD PRESSURE: 123 MMHG | DIASTOLIC BLOOD PRESSURE: 83 MMHG | WEIGHT: 224 LBS | OXYGEN SATURATION: 95 %

## 2025-04-08 DIAGNOSIS — Z12.11 ENCOUNTER FOR SCREENING FOR MALIGNANT NEOPLASM OF COLON: ICD-10-CM

## 2025-04-08 PROCEDURE — 99213 OFFICE O/P EST LOW 20 MIN: CPT

## 2025-04-08 PROCEDURE — G2211 COMPLEX E/M VISIT ADD ON: CPT | Mod: NC

## 2025-04-08 RX ORDER — SODIUM SULFATE, POTASSIUM SULFATE AND MAGNESIUM SULFATE 1.6; 3.13; 17.5 G/177ML; G/177ML; G/177ML
17.5-3.13-1.6 SOLUTION ORAL
Qty: 1 | Refills: 0 | Status: ACTIVE | COMMUNITY
Start: 2025-04-08 | End: 1900-01-01

## 2025-04-08 NOTE — BRIEF OPERATIVE NOTE - CONDITION POST OP
Provided written and verbal discharge instructions. Patient verbalized understanding. Patient got out of bed without difficulty. Tolerating PO liquids. Procedure MD spoke with patient and /or family post procedure.    
satisfactory
stable to pacu

## 2025-04-24 ENCOUNTER — OUTPATIENT (OUTPATIENT)
Dept: OUTPATIENT SERVICES | Facility: HOSPITAL | Age: 55
LOS: 1 days | End: 2025-04-24
Payer: MEDICAID

## 2025-04-24 ENCOUNTER — APPOINTMENT (OUTPATIENT)
Dept: GASTROENTEROLOGY | Facility: HOSPITAL | Age: 55
End: 2025-04-24

## 2025-04-24 ENCOUNTER — RESULT REVIEW (OUTPATIENT)
Age: 55
End: 2025-04-24

## 2025-04-24 DIAGNOSIS — R10.9 UNSPECIFIED ABDOMINAL PAIN: ICD-10-CM

## 2025-04-24 DIAGNOSIS — Z98.890 OTHER SPECIFIED POSTPROCEDURAL STATES: Chronic | ICD-10-CM

## 2025-04-24 DIAGNOSIS — M67.432 GANGLION, LEFT WRIST: Chronic | ICD-10-CM

## 2025-04-24 DIAGNOSIS — R15.2 FECAL URGENCY: ICD-10-CM

## 2025-04-24 PROCEDURE — 88305 TISSUE EXAM BY PATHOLOGIST: CPT | Mod: 26

## 2025-04-24 PROCEDURE — 43239 EGD BIOPSY SINGLE/MULTIPLE: CPT

## 2025-04-24 PROCEDURE — 88305 TISSUE EXAM BY PATHOLOGIST: CPT

## 2025-04-24 PROCEDURE — 45385 COLONOSCOPY W/LESION REMOVAL: CPT

## 2025-04-24 PROCEDURE — 45380 COLONOSCOPY AND BIOPSY: CPT | Mod: PT,XS

## 2025-04-24 PROCEDURE — 45385 COLONOSCOPY W/LESION REMOVAL: CPT | Mod: PT

## 2025-04-24 PROCEDURE — 88312 SPECIAL STAINS GROUP 1: CPT

## 2025-04-24 PROCEDURE — 88312 SPECIAL STAINS GROUP 1: CPT | Mod: 26

## 2025-04-24 RX ADMIN — Medication 75 MILLILITER(S): at 13:41

## 2025-04-28 LAB — SURGICAL PATHOLOGY STUDY: SIGNIFICANT CHANGE UP

## 2025-05-01 ENCOUNTER — APPOINTMENT (OUTPATIENT)
Dept: CT IMAGING | Facility: CLINIC | Age: 55
End: 2025-05-01

## 2025-05-15 ENCOUNTER — OUTPATIENT (OUTPATIENT)
Dept: OUTPATIENT SERVICES | Facility: HOSPITAL | Age: 55
LOS: 1 days | End: 2025-05-15
Payer: MEDICAID

## 2025-05-15 ENCOUNTER — APPOINTMENT (OUTPATIENT)
Dept: CT IMAGING | Facility: CLINIC | Age: 55
End: 2025-05-15
Payer: MEDICAID

## 2025-05-15 DIAGNOSIS — Z00.8 ENCOUNTER FOR OTHER GENERAL EXAMINATION: ICD-10-CM

## 2025-05-15 DIAGNOSIS — M67.432 GANGLION, LEFT WRIST: Chronic | ICD-10-CM

## 2025-05-15 DIAGNOSIS — Z98.890 OTHER SPECIFIED POSTPROCEDURAL STATES: Chronic | ICD-10-CM

## 2025-05-15 DIAGNOSIS — R10.11 RIGHT UPPER QUADRANT PAIN: ICD-10-CM

## 2025-05-15 PROCEDURE — 74160 CT ABDOMEN W/CONTRAST: CPT | Mod: 26

## 2025-05-15 PROCEDURE — 74160 CT ABDOMEN W/CONTRAST: CPT

## 2025-05-23 ENCOUNTER — APPOINTMENT (OUTPATIENT)
Dept: GASTROENTEROLOGY | Facility: CLINIC | Age: 55
End: 2025-05-23
Payer: MEDICAID

## 2025-05-23 VITALS
DIASTOLIC BLOOD PRESSURE: 79 MMHG | TEMPERATURE: 98.5 F | OXYGEN SATURATION: 95 % | SYSTOLIC BLOOD PRESSURE: 122 MMHG | HEART RATE: 73 BPM | WEIGHT: 228 LBS | BODY MASS INDEX: 32.64 KG/M2 | RESPIRATION RATE: 14 BRPM | HEIGHT: 70 IN

## 2025-05-23 DIAGNOSIS — R10.9 UNSPECIFIED ABDOMINAL PAIN: ICD-10-CM

## 2025-05-23 DIAGNOSIS — K21.9 GASTRO-ESOPHAGEAL REFLUX DISEASE W/OUT ESOPHAGITIS: ICD-10-CM

## 2025-05-23 PROCEDURE — G2211 COMPLEX E/M VISIT ADD ON: CPT | Mod: NC

## 2025-05-23 PROCEDURE — 99214 OFFICE O/P EST MOD 30 MIN: CPT

## 2025-07-24 NOTE — ASU DISCHARGE PLAN (ADULT/PEDIATRIC) - NO SPORTS/GYM DURATION
Pt arrives for eval of dizziness. Had a syncopal episode on Saturday- reports he was at a party- hot, alcohol, vaped something but unsure what the vape was, and smoing cigarettes. Feels his head is congested  Denies CP, SOB.   For 4-6 weeks

## 2025-07-29 ENCOUNTER — APPOINTMENT (OUTPATIENT)
Dept: FAMILY MEDICINE | Facility: CLINIC | Age: 55
End: 2025-07-29
Payer: MEDICAID

## 2025-07-29 VITALS
OXYGEN SATURATION: 97 % | SYSTOLIC BLOOD PRESSURE: 140 MMHG | BODY MASS INDEX: 32.78 KG/M2 | HEIGHT: 70 IN | DIASTOLIC BLOOD PRESSURE: 92 MMHG | HEART RATE: 87 BPM | WEIGHT: 229 LBS

## 2025-07-29 DIAGNOSIS — R07.89 OTHER CHEST PAIN: ICD-10-CM

## 2025-07-29 DIAGNOSIS — R10.11 RIGHT UPPER QUADRANT PAIN: ICD-10-CM

## 2025-07-29 PROCEDURE — 99214 OFFICE O/P EST MOD 30 MIN: CPT

## 2025-07-29 PROCEDURE — G2211 COMPLEX E/M VISIT ADD ON: CPT | Mod: NC

## (undated) DEVICE — DRAPE 3/4 SHEET W REINFORCEMENT 56X77"

## (undated) DEVICE — DRSG COBAN 4"

## (undated) DEVICE — DRAPE 1/2 SHEET 40X57"

## (undated) DEVICE — SPECIMEN CONTAINER 100ML

## (undated) DEVICE — DRSG TEGADERM 6"X8"

## (undated) DEVICE — DRSG TEGADERM 1.75X1.75"

## (undated) DEVICE — POLY TRAP ETRAP

## (undated) DEVICE — DRSG CURITY GAUZE SPONGE 4 X 4" 12-PLY

## (undated) DEVICE — VENODYNE/SCD SLEEVE CALF LARGE

## (undated) DEVICE — POSITIONER ARM STRAP 2 OF 24", 1 OF 36"

## (undated) DEVICE — KIT ENDO PROCEDURE CUST W/VLV

## (undated) DEVICE — ENDOCUFF VISION SZ 2 LG GRN

## (undated) DEVICE — WRAP COMPRESSION CALF MED

## (undated) DEVICE — STAPLER SKIN VISI-STAT 35 WIDE

## (undated) DEVICE — BLANKET WARMER LOWER ADULT

## (undated) DEVICE — MARKING PEN W RULER

## (undated) DEVICE — SNARE EXACTO COLD 9MMX230CM

## (undated) DEVICE — WARMING BLANKET LOWER ADULT

## (undated) DEVICE — GLV 7.5 PROTEXIS

## (undated) DEVICE — SUT SOFSILK 2-0 18" C-23

## (undated) DEVICE — DRSG STOCKINETTE IMPERVIOUS XL

## (undated) DEVICE — SPONGE PEANUT AUTO COUNT

## (undated) DEVICE — SOL IRR POUR NS 0.9% 500ML

## (undated) DEVICE — ELCTR PENCIL NEPTUNE SMOKE EVACUATION

## (undated) DEVICE — DRAPE SPLIT SHEET 77" X 108"

## (undated) DEVICE — DRAPE 3/4 SHEET 52X76"

## (undated) DEVICE — DRAPE MAGNETIC INSTRUMENT MEDIUM

## (undated) DEVICE — BLADE SCALPEL SAFETYLOCK #10

## (undated) DEVICE — SNARE POLYP SENS SM 13MM 240CM

## (undated) DEVICE — DRAPE INSTRUMENT POUCH

## (undated) DEVICE — MARKER SKIN MULTI TIP 6"

## (undated) DEVICE — SYR LUER LOK 10CC

## (undated) DEVICE — GLV 7 PROTEXIS (WHITE)

## (undated) DEVICE — SUT POLYSORB 3-0 30" V-20 UNDYED

## (undated) DEVICE — DRAIN JACKSON PRATT 10MM FLAT FULL NO TROCAR

## (undated) DEVICE — TUBING CAP SET ERBEFLO CLEVERCAP HYBRID CO2 FOR OLYMPUS SCOPES AND UCR

## (undated) DEVICE — Device

## (undated) DEVICE — SUT DERMABOND 0.7ML

## (undated) DEVICE — DRAIN BLAKE 15FR BARD CHANNEL

## (undated) DEVICE — ELCTR BOVIE PENCIL SMOKE EVACUATION

## (undated) DEVICE — FORCEP RADIAL JAW 4 W NDL 2.4MM 2.8MM 240CM ORANGE DISP

## (undated) DEVICE — PACK MINOR

## (undated) DEVICE — DRSG XEROFORM 5"

## (undated) DEVICE — PREP BETADINE SPONGE STICKS

## (undated) DEVICE — LIGASURE SM JAW 16.5MM 18CM

## (undated) DEVICE — CONTAINER SPECIMEN 100ML

## (undated) DEVICE — GAMMA SLEEVE DISPOSABLE

## (undated) DEVICE — SYR SAFE TB 1CC 27G X 0.5

## (undated) DEVICE — SUT MONOCRYL 4-0 27" PS-2 UNDYED

## (undated) DEVICE — WARMING BLANKET UPPER ADULT

## (undated) DEVICE — DRSG COMBINE 5X9"

## (undated) DEVICE — ELCTR STRYKER NEPTUNE SMOKE EVACUATION PENCIL (GREEN)

## (undated) DEVICE — PLATE NESSY 170

## (undated) DEVICE — NDL HYPO SAFE 25G X 1.5"

## (undated) DEVICE — SUT SURGIPRO II 4-0 18" P-12

## (undated) DEVICE — DRSG XEROFORM 5 X 9"

## (undated) DEVICE — SYR LUER LOK 30CC

## (undated) DEVICE — ELCTR PENCIL SMOKE EVACUATION

## (undated) DEVICE — POSITIONER FOAM HEADREST

## (undated) DEVICE — DRAIN RESERVOIR FOR JACKSON PRATT 100CC CARDINAL

## (undated) DEVICE — DRSG STOCKINETTE IMPERVIOUS MED

## (undated) DEVICE — LIGASURE SMALL JAW

## (undated) DEVICE — DRAPE HALF SHEET 40X57"

## (undated) DEVICE — DRAPE INSTRUMENT POUCH 6.75" X 11"

## (undated) DEVICE — SOL IRR POUR H2O 1000ML

## (undated) DEVICE — DRSG ADAPTIC 3 X 8"

## (undated) DEVICE — GOWN LG W TOWEL

## (undated) DEVICE — BLADE SCALPEL SAFETYLOCK #15

## (undated) DEVICE — SUT POLYSORB 2-0 30" V-20 UNDYED

## (undated) DEVICE — GOWN TRIMAX LG

## (undated) DEVICE — DRSG STERISTRIPS 0.5 X 4"

## (undated) DEVICE — FORCEP RADIAL JAW 4 240CM DISP